# Patient Record
Sex: MALE | Race: BLACK OR AFRICAN AMERICAN | Employment: OTHER | URBAN - METROPOLITAN AREA
[De-identification: names, ages, dates, MRNs, and addresses within clinical notes are randomized per-mention and may not be internally consistent; named-entity substitution may affect disease eponyms.]

---

## 2019-01-01 ENCOUNTER — APPOINTMENT (OUTPATIENT)
Dept: CT IMAGING | Age: 69
DRG: 291 | End: 2019-01-01
Attending: STUDENT IN AN ORGANIZED HEALTH CARE EDUCATION/TRAINING PROGRAM
Payer: MEDICARE

## 2019-01-01 ENCOUNTER — APPOINTMENT (OUTPATIENT)
Dept: MRI IMAGING | Age: 69
DRG: 291 | End: 2019-01-01
Attending: PSYCHIATRY & NEUROLOGY
Payer: MEDICARE

## 2019-01-01 ENCOUNTER — HOSPITAL ENCOUNTER (INPATIENT)
Age: 69
LOS: 35 days | Discharge: HOSPICE | DRG: 291 | End: 2019-11-05
Attending: EMERGENCY MEDICINE | Admitting: STUDENT IN AN ORGANIZED HEALTH CARE EDUCATION/TRAINING PROGRAM
Payer: MEDICARE

## 2019-01-01 ENCOUNTER — APPOINTMENT (OUTPATIENT)
Dept: GENERAL RADIOLOGY | Age: 69
DRG: 291 | End: 2019-01-01
Attending: EMERGENCY MEDICINE
Payer: MEDICARE

## 2019-01-01 ENCOUNTER — HOSPICE ADMISSION (OUTPATIENT)
Dept: HOSPICE | Facility: HOSPICE | Age: 69
End: 2019-01-01
Payer: MEDICARE

## 2019-01-01 ENCOUNTER — APPOINTMENT (OUTPATIENT)
Dept: CT IMAGING | Age: 69
DRG: 291 | End: 2019-01-01
Attending: EMERGENCY MEDICINE
Payer: MEDICARE

## 2019-01-01 ENCOUNTER — HOSPITAL ENCOUNTER (INPATIENT)
Age: 69
LOS: 1 days | End: 2019-11-06
Attending: INTERNAL MEDICINE | Admitting: INTERNAL MEDICINE

## 2019-01-01 ENCOUNTER — APPOINTMENT (OUTPATIENT)
Dept: NON INVASIVE DIAGNOSTICS | Age: 69
DRG: 291 | End: 2019-01-01
Attending: STUDENT IN AN ORGANIZED HEALTH CARE EDUCATION/TRAINING PROGRAM
Payer: MEDICARE

## 2019-01-01 VITALS
OXYGEN SATURATION: 40 % | HEART RATE: 31 BPM | SYSTOLIC BLOOD PRESSURE: 120 MMHG | DIASTOLIC BLOOD PRESSURE: 80 MMHG | TEMPERATURE: 97.4 F | RESPIRATION RATE: 23 BRPM | BODY MASS INDEX: 26.82 KG/M2 | HEIGHT: 65 IN | WEIGHT: 161 LBS

## 2019-01-01 VITALS
TEMPERATURE: 98 F | WEIGHT: 161.38 LBS | RESPIRATION RATE: 26 BRPM | HEART RATE: 111 BPM | DIASTOLIC BLOOD PRESSURE: 101 MMHG | HEIGHT: 65 IN | BODY MASS INDEX: 26.89 KG/M2 | SYSTOLIC BLOOD PRESSURE: 134 MMHG | OXYGEN SATURATION: 98 %

## 2019-01-01 DIAGNOSIS — Z71.89 GOALS OF CARE, COUNSELING/DISCUSSION: ICD-10-CM

## 2019-01-01 DIAGNOSIS — G62.9 NEUROPATHY: ICD-10-CM

## 2019-01-01 DIAGNOSIS — I50.9 CONGESTIVE HEART FAILURE, UNSPECIFIED HF CHRONICITY, UNSPECIFIED HEART FAILURE TYPE (HCC): Primary | ICD-10-CM

## 2019-01-01 DIAGNOSIS — R41.82 ALTERED MENTAL STATUS, UNSPECIFIED ALTERED MENTAL STATUS TYPE: ICD-10-CM

## 2019-01-01 DIAGNOSIS — R60.9 EDEMA, UNSPECIFIED TYPE: ICD-10-CM

## 2019-01-01 DIAGNOSIS — H21.02 HYPHEMA OF LEFT EYE: ICD-10-CM

## 2019-01-01 DIAGNOSIS — I63.9 CEREBROVASCULAR ACCIDENT (CVA), UNSPECIFIED MECHANISM (HCC): ICD-10-CM

## 2019-01-01 DIAGNOSIS — R25.1 TREMOR: ICD-10-CM

## 2019-01-01 DIAGNOSIS — Z66 DO NOT RESUSCITATE: ICD-10-CM

## 2019-01-01 DIAGNOSIS — R63.4 WEIGHT LOSS: ICD-10-CM

## 2019-01-01 DIAGNOSIS — F03.90 DEMENTIA WITHOUT BEHAVIORAL DISTURBANCE, UNSPECIFIED DEMENTIA TYPE: ICD-10-CM

## 2019-01-01 DIAGNOSIS — J90 PLEURAL EFFUSION: ICD-10-CM

## 2019-01-01 LAB
ACE SERPL-CCNC: 22 U/L (ref 14–82)
ALBUMIN SERPL-MCNC: 3.1 G/DL (ref 3.5–5)
ALBUMIN/GLOB SERPL: 0.8 {RATIO} (ref 1.1–2.2)
ALDOLASE SERPL-CCNC: 5.4 U/L (ref 3.3–10.3)
ALP SERPL-CCNC: 97 U/L (ref 45–117)
ALT SERPL-CCNC: 96 U/L (ref 12–78)
AMMONIA PLAS-SCNC: <10 UMOL/L
AMPHET UR QL SCN: NEGATIVE
ANION GAP SERPL CALC-SCNC: 10 MMOL/L (ref 5–15)
ANION GAP SERPL CALC-SCNC: 11 MMOL/L (ref 5–15)
ANION GAP SERPL CALC-SCNC: 13 MMOL/L (ref 5–15)
ANION GAP SERPL CALC-SCNC: 9 MMOL/L (ref 5–15)
ANION GAP SERPL CALC-SCNC: 9 MMOL/L (ref 5–15)
APPEARANCE UR: CLEAR
APTT PPP: 28.9 SEC (ref 22.1–32)
AST SERPL-CCNC: 56 U/L (ref 15–37)
ATRIAL RATE: 92 BPM
BACTERIA SPEC CULT: ABNORMAL
BACTERIA SPEC CULT: ABNORMAL
BACTERIA URNS QL MICRO: NEGATIVE /HPF
BARBITURATES UR QL SCN: NEGATIVE
BASOPHILS # BLD: 0 K/UL (ref 0–0.1)
BASOPHILS NFR BLD: 0 % (ref 0–1)
BENZODIAZ UR QL: NEGATIVE
BILIRUB SERPL-MCNC: 1.1 MG/DL (ref 0.2–1)
BILIRUB UR QL: NEGATIVE
BNP SERPL-MCNC: ABNORMAL PG/ML (ref 0–125)
BUN SERPL-MCNC: 13 MG/DL (ref 6–20)
BUN SERPL-MCNC: 14 MG/DL (ref 6–20)
BUN SERPL-MCNC: 17 MG/DL (ref 6–20)
BUN SERPL-MCNC: 19 MG/DL (ref 6–20)
BUN SERPL-MCNC: 24 MG/DL (ref 6–20)
BUN SERPL-MCNC: 31 MG/DL (ref 6–20)
BUN/CREAT SERPL: 14 (ref 12–20)
BUN/CREAT SERPL: 15 (ref 12–20)
BUN/CREAT SERPL: 22 (ref 12–20)
BUN/CREAT SERPL: 23 (ref 12–20)
BUN/CREAT SERPL: 23 (ref 12–20)
BUN/CREAT SERPL: 25 (ref 12–20)
CALCIUM SERPL-MCNC: 8.2 MG/DL (ref 8.5–10.1)
CALCIUM SERPL-MCNC: 8.3 MG/DL (ref 8.5–10.1)
CALCIUM SERPL-MCNC: 8.5 MG/DL (ref 8.5–10.1)
CALCIUM SERPL-MCNC: 8.6 MG/DL (ref 8.5–10.1)
CALCIUM SERPL-MCNC: 8.9 MG/DL (ref 8.5–10.1)
CALCIUM SERPL-MCNC: 9.6 MG/DL (ref 8.5–10.1)
CALCULATED P AXIS, ECG09: 50 DEGREES
CALCULATED R AXIS, ECG10: 108 DEGREES
CALCULATED T AXIS, ECG11: -28 DEGREES
CANNABINOIDS UR QL SCN: NEGATIVE
CHLORIDE SERPL-SCNC: 102 MMOL/L (ref 97–108)
CHLORIDE SERPL-SCNC: 102 MMOL/L (ref 97–108)
CHLORIDE SERPL-SCNC: 103 MMOL/L (ref 97–108)
CHLORIDE SERPL-SCNC: 106 MMOL/L (ref 97–108)
CHLORIDE SERPL-SCNC: 107 MMOL/L (ref 97–108)
CHOLEST SERPL-MCNC: 131 MG/DL
CO2 SERPL-SCNC: 21 MMOL/L (ref 21–32)
CO2 SERPL-SCNC: 21 MMOL/L (ref 21–32)
CO2 SERPL-SCNC: 22 MMOL/L (ref 21–32)
CO2 SERPL-SCNC: 23 MMOL/L (ref 21–32)
CO2 SERPL-SCNC: 24 MMOL/L (ref 21–32)
COCAINE UR QL SCN: NEGATIVE
COLOR UR: ABNORMAL
CREAT SERPL-MCNC: 0.79 MG/DL (ref 0.7–1.3)
CREAT SERPL-MCNC: 0.83 MG/DL (ref 0.7–1.3)
CREAT SERPL-MCNC: 0.91 MG/DL (ref 0.7–1.3)
CREAT SERPL-MCNC: 0.95 MG/DL (ref 0.7–1.3)
CREAT SERPL-MCNC: 1.06 MG/DL (ref 0.7–1.3)
CREAT SERPL-MCNC: 1.24 MG/DL (ref 0.7–1.3)
DIAGNOSIS, 93000: NORMAL
DIFFERENTIAL METHOD BLD: ABNORMAL
DRUG SCRN COMMENT,DRGCM: NORMAL
ECHO AO ROOT DIAM: 2.33 CM
ECHO AV AREA PLAN: 2.3 CM2
ECHO EST RA PRESSURE: 5 MMHG
ECHO LA AREA 4C: 19.9 CM2
ECHO LA MAJOR AXIS: 4.47 CM
ECHO LA TO AORTIC ROOT RATIO: 1.91
ECHO LA VOL 4C: 56.72 ML (ref 18–58)
ECHO LA VOLUME INDEX A4C: 33.7 ML/M2 (ref 16–28)
ECHO LV EDV A4C: 142.1 ML
ECHO LV EDV INDEX A4C: 84.4 ML/M2
ECHO LV EJECTION FRACTION A4C: 4 %
ECHO LV ESV A4C: 136.3 ML
ECHO LV ESV INDEX A4C: 81 ML/M2
ECHO LV INTERNAL DIMENSION DIASTOLIC: 4.9 CM (ref 4.2–5.9)
ECHO LV INTERNAL DIMENSION SYSTOLIC: 4.67 CM
ECHO LV IVSD: 1.28 CM (ref 0.6–1)
ECHO LV MASS 2D: 271.2 G (ref 88–224)
ECHO LV MASS INDEX 2D: 135.8 G/M2 (ref 49–115)
ECHO LV POSTERIOR WALL DIASTOLIC: 1.13 CM (ref 0.6–1)
ECHO LVOT DIAM: 1.8 CM
ECHO LVOT PEAK GRADIENT: 0.5 MMHG
ECHO LVOT PEAK VELOCITY: 36.48 CM/S
ECHO MV A VELOCITY: 21.4 CM/S
ECHO MV AREA PISA: 0.1 CM2
ECHO MV AREA PLAN: 3.9 CM2
ECHO MV E DECELERATION TIME (DT): 40.6 MS
ECHO MV E VELOCITY: 43.4 CM/S
ECHO MV E/A RATIO: 2
ECHO MV EROA PISA: 0.1 CM2
ECHO MV MAX VELOCITY: 470.18 CM/S
ECHO MV MEAN GRADIENT: 57.6 MMHG
ECHO MV PEAK GRADIENT: 88.4 MMHG
ECHO MV REGURGITANT PEAK GRADIENT: 81 MMHG
ECHO MV REGURGITANT PEAK VELOCITY: 449.86 CM/S
ECHO MV REGURGITANT RADIUS PISA: 0.4 CM
ECHO MV VTI: 136.58 CM
ECHO PULMONARY ARTERY SYSTOLIC PRESSURE (PASP): 42.7 MMHG
ECHO PV MAX VELOCITY: 35.41 CM/S
ECHO PV PEAK GRADIENT: 0.5 MMHG
ECHO RA AREA 4C: 23.9 CM2
ECHO RIGHT VENTRICULAR SYSTOLIC PRESSURE (RVSP): 42.7 MMHG
ECHO TV REGURGITANT MAX VELOCITY: 306.99 CM/S
ECHO TV REGURGITANT PEAK GRADIENT: 37.7 MMHG
EOSINOPHIL # BLD: 0.1 K/UL (ref 0–0.4)
EOSINOPHIL NFR BLD: 1 % (ref 0–7)
EPITH CASTS URNS QL MICRO: ABNORMAL /LPF
ERYTHROCYTE [DISTWIDTH] IN BLOOD BY AUTOMATED COUNT: 25.2 % (ref 11.5–14.5)
ERYTHROCYTE [DISTWIDTH] IN BLOOD BY AUTOMATED COUNT: 26.4 % (ref 11.5–14.5)
ERYTHROCYTE [DISTWIDTH] IN BLOOD BY AUTOMATED COUNT: 27.3 % (ref 11.5–14.5)
ERYTHROCYTE [SEDIMENTATION RATE] IN BLOOD: 3 MM/HR (ref 0–20)
ETHANOL SERPL-MCNC: <10 MG/DL
FOLATE SERPL-MCNC: 13.9 NG/ML (ref 5–21)
GLOBULIN SER CALC-MCNC: 3.7 G/DL (ref 2–4)
GLUCOSE SERPL-MCNC: 103 MG/DL (ref 65–100)
GLUCOSE SERPL-MCNC: 112 MG/DL (ref 65–100)
GLUCOSE SERPL-MCNC: 120 MG/DL (ref 65–100)
GLUCOSE SERPL-MCNC: 121 MG/DL (ref 65–100)
GLUCOSE SERPL-MCNC: 129 MG/DL (ref 65–100)
GLUCOSE SERPL-MCNC: 80 MG/DL (ref 65–100)
GLUCOSE UR STRIP.AUTO-MCNC: NEGATIVE MG/DL
GRAM STN SPEC: ABNORMAL
GRAM STN SPEC: ABNORMAL
HCT VFR BLD AUTO: 34.3 % (ref 36.6–50.3)
HCT VFR BLD AUTO: 36.8 % (ref 36.6–50.3)
HCT VFR BLD AUTO: 39 % (ref 36.6–50.3)
HDLC SERPL-MCNC: 39 MG/DL
HDLC SERPL: 3.4 {RATIO} (ref 0–5)
HGB BLD-MCNC: 10.5 G/DL (ref 12.1–17)
HGB BLD-MCNC: 11.1 G/DL (ref 12.1–17)
HGB BLD-MCNC: 11.7 G/DL (ref 12.1–17)
HGB UR QL STRIP: NEGATIVE
HYALINE CASTS URNS QL MICRO: ABNORMAL /LPF (ref 0–5)
IMM GRANULOCYTES # BLD AUTO: 0 K/UL (ref 0–0.04)
IMM GRANULOCYTES NFR BLD AUTO: 0 % (ref 0–0.5)
INR PPP: 1.4 (ref 0.9–1.1)
KETONES UR QL STRIP.AUTO: ABNORMAL MG/DL
LACTATE SERPL-SCNC: 1.9 MMOL/L (ref 0.4–2)
LACTATE SERPL-SCNC: 2.4 MMOL/L (ref 0.4–2)
LACTATE SERPL-SCNC: 2.6 MMOL/L (ref 0.4–2)
LACTATE SERPL-SCNC: 3 MMOL/L (ref 0.4–2)
LDLC SERPL CALC-MCNC: 76.8 MG/DL (ref 0–100)
LEUKOCYTE ESTERASE UR QL STRIP.AUTO: NEGATIVE
LIPID PROFILE,FLP: NORMAL
LYMPHOCYTES # BLD: 1.3 K/UL (ref 0.8–3.5)
LYMPHOCYTES NFR BLD: 26 % (ref 12–49)
MAGNESIUM SERPL-MCNC: 2 MG/DL (ref 1.6–2.4)
MAGNESIUM SERPL-MCNC: 2.1 MG/DL (ref 1.6–2.4)
MAGNESIUM SERPL-MCNC: 2.2 MG/DL (ref 1.6–2.4)
MAGNESIUM SERPL-MCNC: 2.2 MG/DL (ref 1.6–2.4)
MCH RBC QN AUTO: 20.1 PG (ref 26–34)
MCH RBC QN AUTO: 20.3 PG (ref 26–34)
MCH RBC QN AUTO: 20.3 PG (ref 26–34)
MCHC RBC AUTO-ENTMCNC: 30 G/DL (ref 30–36.5)
MCHC RBC AUTO-ENTMCNC: 30.2 G/DL (ref 30–36.5)
MCHC RBC AUTO-ENTMCNC: 30.6 G/DL (ref 30–36.5)
MCV RBC AUTO: 66.2 FL (ref 80–99)
MCV RBC AUTO: 67 FL (ref 80–99)
MCV RBC AUTO: 67.4 FL (ref 80–99)
METHADONE UR QL: NEGATIVE
MONOCYTES # BLD: 0.4 K/UL (ref 0–1)
MONOCYTES NFR BLD: 7 % (ref 5–13)
MUCOUS THREADS URNS QL MICRO: ABNORMAL /LPF
NEUTS SEG # BLD: 3.3 K/UL (ref 1.8–8)
NEUTS SEG NFR BLD: 66 % (ref 32–75)
NITRITE UR QL STRIP.AUTO: NEGATIVE
NRBC # BLD: 0 K/UL (ref 0–0.01)
NRBC # BLD: 0 K/UL (ref 0–0.01)
NRBC # BLD: 0.03 K/UL (ref 0–0.01)
NRBC BLD-RTO: 0 PER 100 WBC
NRBC BLD-RTO: 0 PER 100 WBC
NRBC BLD-RTO: 0.6 PER 100 WBC
OPIATES UR QL: NEGATIVE
P-R INTERVAL, ECG05: 126 MS
PCP UR QL: NEGATIVE
PERIPHERAL SMEAR,PSM: NORMAL
PH UR STRIP: 5 [PH] (ref 5–8)
PHOSPHATE SERPL-MCNC: 2.9 MG/DL (ref 2.6–4.7)
PLATELET # BLD AUTO: 236 K/UL (ref 150–400)
PLATELET # BLD AUTO: 256 K/UL (ref 150–400)
PLATELET # BLD AUTO: 269 K/UL (ref 150–400)
PMV BLD AUTO: 9.8 FL (ref 8.9–12.9)
POTASSIUM SERPL-SCNC: 3.4 MMOL/L (ref 3.5–5.1)
POTASSIUM SERPL-SCNC: 3.4 MMOL/L (ref 3.5–5.1)
POTASSIUM SERPL-SCNC: 3.6 MMOL/L (ref 3.5–5.1)
POTASSIUM SERPL-SCNC: 4.4 MMOL/L (ref 3.5–5.1)
POTASSIUM SERPL-SCNC: 4.8 MMOL/L (ref 3.5–5.1)
PROCALCITONIN SERPL-MCNC: 0.1 NG/ML
PROT SERPL-MCNC: 6.8 G/DL (ref 6.4–8.2)
PROT UR STRIP-MCNC: ABNORMAL MG/DL
PROTHROMBIN TIME: 14.3 SEC (ref 9–11.1)
Q-T INTERVAL, ECG07: 366 MS
QRS DURATION, ECG06: 98 MS
QTC CALCULATION (BEZET), ECG08: 452 MS
RBC # BLD AUTO: 5.18 M/UL (ref 4.1–5.7)
RBC # BLD AUTO: 5.46 M/UL (ref 4.1–5.7)
RBC # BLD AUTO: 5.82 M/UL (ref 4.1–5.7)
RBC #/AREA URNS HPF: ABNORMAL /HPF (ref 0–5)
RBC MORPH BLD: ABNORMAL
RETICS # AUTO: 0.11 M/UL (ref 0.03–0.1)
RETICS/RBC NFR AUTO: 1.8 % (ref 0.7–2.1)
RPR SER QL: NONREACTIVE
SERVICE CMNT-IMP: ABNORMAL
SODIUM SERPL-SCNC: 134 MMOL/L (ref 136–145)
SODIUM SERPL-SCNC: 137 MMOL/L (ref 136–145)
SODIUM SERPL-SCNC: 139 MMOL/L (ref 136–145)
SODIUM SERPL-SCNC: 140 MMOL/L (ref 136–145)
SODIUM SERPL-SCNC: 141 MMOL/L (ref 136–145)
SP GR UR REFRACTOMETRY: 1.02 (ref 1–1.03)
T4 FREE SERPL-MCNC: 1 NG/DL (ref 0.8–1.5)
THERAPEUTIC RANGE,PTTT: NORMAL SECS (ref 58–77)
TRIGL SERPL-MCNC: 76 MG/DL (ref ?–150)
TROPONIN I SERPL-MCNC: 0.07 NG/ML
TROPONIN I SERPL-MCNC: 0.07 NG/ML
TROPONIN I SERPL-MCNC: 0.08 NG/ML
TSH SERPL DL<=0.05 MIU/L-ACNC: 6.77 UIU/ML (ref 0.36–3.74)
UROBILINOGEN UR QL STRIP.AUTO: 0.2 EU/DL (ref 0.2–1)
VENTRICULAR RATE, ECG03: 92 BPM
VIT B12 SERPL-MCNC: >2000 PG/ML (ref 193–986)
VIT B12 SERPL-MCNC: >2000 PG/ML (ref 193–986)
VIT B6 SERPL-MCNC: 2.8 UG/L (ref 5.3–46.7)
VLDLC SERPL CALC-MCNC: 15.2 MG/DL
WBC # BLD AUTO: 4.2 K/UL (ref 4.1–11.1)
WBC # BLD AUTO: 4.3 K/UL (ref 4.1–11.1)
WBC # BLD AUTO: 5.1 K/UL (ref 4.1–11.1)
WBC URNS QL MICRO: ABNORMAL /HPF (ref 0–4)

## 2019-01-01 PROCEDURE — 36415 COLL VENOUS BLD VENIPUNCTURE: CPT

## 2019-01-01 PROCEDURE — 74011250637 HC RX REV CODE- 250/637: Performed by: INTERNAL MEDICINE

## 2019-01-01 PROCEDURE — 77010033678 HC OXYGEN DAILY

## 2019-01-01 PROCEDURE — 65270000032 HC RM SEMIPRIVATE

## 2019-01-01 PROCEDURE — 74011250636 HC RX REV CODE- 250/636: Performed by: STUDENT IN AN ORGANIZED HEALTH CARE EDUCATION/TRAINING PROGRAM

## 2019-01-01 PROCEDURE — 65270000029 HC RM PRIVATE

## 2019-01-01 PROCEDURE — 85027 COMPLETE CBC AUTOMATED: CPT

## 2019-01-01 PROCEDURE — 74011250636 HC RX REV CODE- 250/636: Performed by: EMERGENCY MEDICINE

## 2019-01-01 PROCEDURE — 95816 EEG AWAKE AND DROWSY: CPT | Performed by: PSYCHIATRY & NEUROLOGY

## 2019-01-01 PROCEDURE — 99218 HC RM OBSERVATION: CPT

## 2019-01-01 PROCEDURE — 77030011256 HC DRSG MEPILEX <16IN NO BORD MOLN -A

## 2019-01-01 PROCEDURE — 99284 EMERGENCY DEPT VISIT MOD MDM: CPT

## 2019-01-01 PROCEDURE — 71045 X-RAY EXAM CHEST 1 VIEW: CPT

## 2019-01-01 PROCEDURE — 84145 PROCALCITONIN (PCT): CPT

## 2019-01-01 PROCEDURE — 83880 ASSAY OF NATRIURETIC PEPTIDE: CPT

## 2019-01-01 PROCEDURE — 74011250637 HC RX REV CODE- 250/637: Performed by: STUDENT IN AN ORGANIZED HEALTH CARE EDUCATION/TRAINING PROGRAM

## 2019-01-01 PROCEDURE — 74011250636 HC RX REV CODE- 250/636: Performed by: PHYSICAL MEDICINE & REHABILITATION

## 2019-01-01 PROCEDURE — 74011250636 HC RX REV CODE- 250/636: Performed by: INTERNAL MEDICINE

## 2019-01-01 PROCEDURE — 97530 THERAPEUTIC ACTIVITIES: CPT | Performed by: PHYSICAL THERAPIST

## 2019-01-01 PROCEDURE — 96376 TX/PRO/DX INJ SAME DRUG ADON: CPT

## 2019-01-01 PROCEDURE — 82607 VITAMIN B-12: CPT

## 2019-01-01 PROCEDURE — 94760 N-INVAS EAR/PLS OXIMETRY 1: CPT

## 2019-01-01 PROCEDURE — 83735 ASSAY OF MAGNESIUM: CPT

## 2019-01-01 PROCEDURE — 80051 ELECTROLYTE PANEL: CPT

## 2019-01-01 PROCEDURE — 80307 DRUG TEST PRSMV CHEM ANLYZR: CPT

## 2019-01-01 PROCEDURE — 85730 THROMBOPLASTIN TIME PARTIAL: CPT

## 2019-01-01 PROCEDURE — 97535 SELF CARE MNGMENT TRAINING: CPT

## 2019-01-01 PROCEDURE — 97116 GAIT TRAINING THERAPY: CPT | Performed by: PHYSICAL THERAPIST

## 2019-01-01 PROCEDURE — 77030037877 HC DRSG MEPILEX >48IN BORD MOLN -A

## 2019-01-01 PROCEDURE — 97116 GAIT TRAINING THERAPY: CPT

## 2019-01-01 PROCEDURE — 74011636320 HC RX REV CODE- 636/320: Performed by: EMERGENCY MEDICINE

## 2019-01-01 PROCEDURE — 84439 ASSAY OF FREE THYROXINE: CPT

## 2019-01-01 PROCEDURE — 84207 ASSAY OF VITAMIN B-6: CPT

## 2019-01-01 PROCEDURE — 86592 SYPHILIS TEST NON-TREP QUAL: CPT

## 2019-01-01 PROCEDURE — 3336500001 HSPC ELECTION

## 2019-01-01 PROCEDURE — 97165 OT EVAL LOW COMPLEX 30 MIN: CPT

## 2019-01-01 PROCEDURE — 85652 RBC SED RATE AUTOMATED: CPT

## 2019-01-01 PROCEDURE — 65220000003 HC RM SEMIPRIVATE PSYCH

## 2019-01-01 PROCEDURE — 87186 SC STD MICRODIL/AGAR DIL: CPT

## 2019-01-01 PROCEDURE — 74177 CT ABD & PELVIS W/CONTRAST: CPT

## 2019-01-01 PROCEDURE — 70450 CT HEAD/BRAIN W/O DYE: CPT

## 2019-01-01 PROCEDURE — G0299 HHS/HOSPICE OF RN EA 15 MIN: HCPCS

## 2019-01-01 PROCEDURE — 80048 BASIC METABOLIC PNL TOTAL CA: CPT

## 2019-01-01 PROCEDURE — 96374 THER/PROPH/DIAG INJ IV PUSH: CPT

## 2019-01-01 PROCEDURE — 80061 LIPID PANEL: CPT

## 2019-01-01 PROCEDURE — 97161 PT EVAL LOW COMPLEX 20 MIN: CPT

## 2019-01-01 PROCEDURE — 87077 CULTURE AEROBIC IDENTIFY: CPT

## 2019-01-01 PROCEDURE — 96372 THER/PROPH/DIAG INJ SC/IM: CPT

## 2019-01-01 PROCEDURE — 0656 HSPC GENERAL INPATIENT

## 2019-01-01 PROCEDURE — 82140 ASSAY OF AMMONIA: CPT

## 2019-01-01 PROCEDURE — 82085 ASSAY OF ALDOLASE: CPT

## 2019-01-01 PROCEDURE — 74011250637 HC RX REV CODE- 250/637: Performed by: HOSPITALIST

## 2019-01-01 PROCEDURE — 84484 ASSAY OF TROPONIN QUANT: CPT

## 2019-01-01 PROCEDURE — 82746 ASSAY OF FOLIC ACID SERUM: CPT

## 2019-01-01 PROCEDURE — 74011250636 HC RX REV CODE- 250/636: Performed by: FAMILY MEDICINE

## 2019-01-01 PROCEDURE — 71275 CT ANGIOGRAPHY CHEST: CPT

## 2019-01-01 PROCEDURE — 85025 COMPLETE CBC W/AUTO DIFF WBC: CPT

## 2019-01-01 PROCEDURE — 81001 URINALYSIS AUTO W/SCOPE: CPT

## 2019-01-01 PROCEDURE — 87205 SMEAR GRAM STAIN: CPT

## 2019-01-01 PROCEDURE — 83605 ASSAY OF LACTIC ACID: CPT

## 2019-01-01 PROCEDURE — 80053 COMPREHEN METABOLIC PANEL: CPT

## 2019-01-01 PROCEDURE — 93306 TTE W/DOPPLER COMPLETE: CPT

## 2019-01-01 PROCEDURE — 84443 ASSAY THYROID STIM HORMONE: CPT

## 2019-01-01 PROCEDURE — 84100 ASSAY OF PHOSPHORUS: CPT

## 2019-01-01 PROCEDURE — 74011636320 HC RX REV CODE- 636/320: Performed by: STUDENT IN AN ORGANIZED HEALTH CARE EDUCATION/TRAINING PROGRAM

## 2019-01-01 PROCEDURE — 74011000250 HC RX REV CODE- 250: Performed by: PHYSICAL MEDICINE & REHABILITATION

## 2019-01-01 PROCEDURE — 70551 MRI BRAIN STEM W/O DYE: CPT

## 2019-01-01 PROCEDURE — 97530 THERAPEUTIC ACTIVITIES: CPT

## 2019-01-01 PROCEDURE — 93005 ELECTROCARDIOGRAM TRACING: CPT

## 2019-01-01 PROCEDURE — 74011250637 HC RX REV CODE- 250/637: Performed by: PHYSICAL MEDICINE & REHABILITATION

## 2019-01-01 PROCEDURE — 85610 PROTHROMBIN TIME: CPT

## 2019-01-01 PROCEDURE — 82164 ANGIOTENSIN I ENZYME TEST: CPT

## 2019-01-01 PROCEDURE — 85045 AUTOMATED RETICULOCYTE COUNT: CPT

## 2019-01-01 RX ORDER — ONDANSETRON 4 MG/1
4 TABLET, ORALLY DISINTEGRATING ORAL
Status: DISCONTINUED | OUTPATIENT
Start: 2019-01-01 | End: 2019-01-01 | Stop reason: HOSPADM

## 2019-01-01 RX ORDER — SODIUM CHLORIDE 0.9 % (FLUSH) 0.9 %
5-10 SYRINGE (ML) INJECTION
Status: COMPLETED | OUTPATIENT
Start: 2019-01-01 | End: 2019-01-01

## 2019-01-01 RX ORDER — HALOPERIDOL 5 MG/ML
2 INJECTION INTRAMUSCULAR
Status: DISCONTINUED | OUTPATIENT
Start: 2019-01-01 | End: 2019-01-01 | Stop reason: HOSPADM

## 2019-01-01 RX ORDER — MORPHINE SULFATE 20 MG/ML
10 SOLUTION ORAL
Status: DISCONTINUED | OUTPATIENT
Start: 2019-01-01 | End: 2019-01-01

## 2019-01-01 RX ORDER — MORPHINE SULFATE 2 MG/ML
3 INJECTION, SOLUTION INTRAMUSCULAR; INTRAVENOUS EVERY 4 HOURS
Status: DISCONTINUED | OUTPATIENT
Start: 2019-01-01 | End: 2019-01-01 | Stop reason: HOSPADM

## 2019-01-01 RX ORDER — SCOLOPAMINE TRANSDERMAL SYSTEM 1 MG/1
1 PATCH, EXTENDED RELEASE TRANSDERMAL
Status: DISCONTINUED | OUTPATIENT
Start: 2019-01-01 | End: 2019-01-01 | Stop reason: HOSPADM

## 2019-01-01 RX ORDER — SPIRONOLACTONE 25 MG/1
25 TABLET ORAL DAILY
Status: DISCONTINUED | OUTPATIENT
Start: 2019-01-01 | End: 2019-01-01

## 2019-01-01 RX ORDER — MORPHINE SULFATE 20 MG/ML
10 SOLUTION ORAL
Status: CANCELLED | OUTPATIENT
Start: 2019-01-01

## 2019-01-01 RX ORDER — FUROSEMIDE 40 MG/1
40 TABLET ORAL
Status: DISCONTINUED | OUTPATIENT
Start: 2019-01-01 | End: 2019-01-01 | Stop reason: HOSPADM

## 2019-01-01 RX ORDER — HALOPERIDOL 1 MG/1
2 TABLET ORAL
Status: DISCONTINUED | OUTPATIENT
Start: 2019-01-01 | End: 2019-01-01

## 2019-01-01 RX ORDER — LEVOTHYROXINE SODIUM 25 UG/1
25 TABLET ORAL
Status: DISCONTINUED | OUTPATIENT
Start: 2019-01-01 | End: 2019-01-01

## 2019-01-01 RX ORDER — CARVEDILOL 3.12 MG/1
3.12 TABLET ORAL 2 TIMES DAILY WITH MEALS
Status: DISCONTINUED | OUTPATIENT
Start: 2019-01-01 | End: 2019-01-01

## 2019-01-01 RX ORDER — MORPHINE SULFATE 2 MG/ML
2 INJECTION, SOLUTION INTRAMUSCULAR; INTRAVENOUS
Status: DISCONTINUED | OUTPATIENT
Start: 2019-01-01 | End: 2019-01-01 | Stop reason: HOSPADM

## 2019-01-01 RX ORDER — POTASSIUM CHLORIDE 750 MG/1
40 TABLET, FILM COATED, EXTENDED RELEASE ORAL
Status: COMPLETED | OUTPATIENT
Start: 2019-01-01 | End: 2019-01-01

## 2019-01-01 RX ORDER — FUROSEMIDE 10 MG/ML
20 INJECTION INTRAMUSCULAR; INTRAVENOUS
Status: DISCONTINUED | OUTPATIENT
Start: 2019-01-01 | End: 2019-01-01

## 2019-01-01 RX ORDER — FACIAL-BODY WIPES
10 EACH TOPICAL DAILY PRN
Status: DISCONTINUED | OUTPATIENT
Start: 2019-01-01 | End: 2019-01-01 | Stop reason: HOSPADM

## 2019-01-01 RX ORDER — CARVEDILOL 3.12 MG/1
3.12 TABLET ORAL 2 TIMES DAILY
Status: DISCONTINUED | OUTPATIENT
Start: 2019-01-01 | End: 2019-01-01

## 2019-01-01 RX ORDER — SODIUM CHLORIDE 0.9 % (FLUSH) 0.9 %
5-40 SYRINGE (ML) INJECTION EVERY 8 HOURS
Status: DISCONTINUED | OUTPATIENT
Start: 2019-01-01 | End: 2019-01-01

## 2019-01-01 RX ORDER — LANOLIN ALCOHOL/MO/W.PET/CERES
100 CREAM (GRAM) TOPICAL DAILY
Status: DISCONTINUED | OUTPATIENT
Start: 2019-01-01 | End: 2019-01-01

## 2019-01-01 RX ORDER — HALOPERIDOL 5 MG/ML
2 INJECTION INTRAMUSCULAR
Status: DISCONTINUED | OUTPATIENT
Start: 2019-01-01 | End: 2019-01-01

## 2019-01-01 RX ORDER — GLYCOPYRROLATE 0.2 MG/ML
0.2 INJECTION INTRAMUSCULAR; INTRAVENOUS
Status: DISCONTINUED | OUTPATIENT
Start: 2019-01-01 | End: 2019-01-01 | Stop reason: HOSPADM

## 2019-01-01 RX ORDER — LORAZEPAM 2 MG/ML
2 CONCENTRATE ORAL
Status: DISCONTINUED | OUTPATIENT
Start: 2019-01-01 | End: 2019-01-01

## 2019-01-01 RX ORDER — LORAZEPAM 2 MG/ML
1 INJECTION INTRAMUSCULAR
Status: DISCONTINUED | OUTPATIENT
Start: 2019-01-01 | End: 2019-01-01 | Stop reason: HOSPADM

## 2019-01-01 RX ORDER — LORAZEPAM 2 MG/ML
1 INJECTION INTRAMUSCULAR
Status: CANCELLED | OUTPATIENT
Start: 2019-01-01

## 2019-01-01 RX ORDER — HEPARIN SODIUM 5000 [USP'U]/ML
5000 INJECTION, SOLUTION INTRAVENOUS; SUBCUTANEOUS EVERY 8 HOURS
Status: DISCONTINUED | OUTPATIENT
Start: 2019-01-01 | End: 2019-01-01

## 2019-01-01 RX ORDER — SODIUM CHLORIDE 0.9 % (FLUSH) 0.9 %
5-40 SYRINGE (ML) INJECTION AS NEEDED
Status: DISCONTINUED | OUTPATIENT
Start: 2019-01-01 | End: 2019-01-01 | Stop reason: HOSPADM

## 2019-01-01 RX ORDER — POTASSIUM CHLORIDE 7.45 MG/ML
10 INJECTION INTRAVENOUS ONCE
Status: COMPLETED | OUTPATIENT
Start: 2019-01-01 | End: 2019-01-01

## 2019-01-01 RX ORDER — MORPHINE SULFATE 20 MG/ML
10 SOLUTION ORAL 4 TIMES DAILY
Status: DISCONTINUED | OUTPATIENT
Start: 2019-01-01 | End: 2019-01-01

## 2019-01-01 RX ORDER — AMLODIPINE BESYLATE 5 MG/1
5 TABLET ORAL DAILY
Status: DISCONTINUED | OUTPATIENT
Start: 2019-01-01 | End: 2019-01-01

## 2019-01-01 RX ORDER — DONEPEZIL HYDROCHLORIDE 5 MG/1
5 TABLET, FILM COATED ORAL
Status: DISCONTINUED | OUTPATIENT
Start: 2019-01-01 | End: 2019-01-01

## 2019-01-01 RX ORDER — MAGNESIUM SULFATE 1 G/100ML
1 INJECTION INTRAVENOUS ONCE
Status: COMPLETED | OUTPATIENT
Start: 2019-01-01 | End: 2019-01-01

## 2019-01-01 RX ORDER — HALOPERIDOL 2 MG/ML
2 SOLUTION ORAL
Status: DISCONTINUED | OUTPATIENT
Start: 2019-01-01 | End: 2019-01-01 | Stop reason: HOSPADM

## 2019-01-01 RX ORDER — ACETAMINOPHEN 325 MG/1
650 TABLET ORAL
Status: DISCONTINUED | OUTPATIENT
Start: 2019-01-01 | End: 2019-01-01

## 2019-01-01 RX ORDER — LOPERAMIDE HYDROCHLORIDE 2 MG/1
2 CAPSULE ORAL
Status: DISCONTINUED | OUTPATIENT
Start: 2019-01-01 | End: 2019-01-01

## 2019-01-01 RX ORDER — FUROSEMIDE 10 MG/ML
20 INJECTION INTRAMUSCULAR; INTRAVENOUS
Status: COMPLETED | OUTPATIENT
Start: 2019-01-01 | End: 2019-01-01

## 2019-01-01 RX ORDER — ACETAMINOPHEN 650 MG/1
650 SUPPOSITORY RECTAL
Status: DISCONTINUED | OUTPATIENT
Start: 2019-01-01 | End: 2019-01-01 | Stop reason: HOSPADM

## 2019-01-01 RX ORDER — MORPHINE SULFATE 2 MG/ML
2 INJECTION, SOLUTION INTRAMUSCULAR; INTRAVENOUS
Status: DISCONTINUED | OUTPATIENT
Start: 2019-01-01 | End: 2019-01-01

## 2019-01-01 RX ORDER — LANOLIN ALCOHOL/MO/W.PET/CERES
1 CREAM (GRAM) TOPICAL 2 TIMES DAILY WITH MEALS
Status: DISCONTINUED | OUTPATIENT
Start: 2019-01-01 | End: 2019-01-01

## 2019-01-01 RX ORDER — LORAZEPAM 2 MG/ML
1 CONCENTRATE ORAL
Status: CANCELLED | OUTPATIENT
Start: 2019-01-01

## 2019-01-01 RX ORDER — FACIAL-BODY WIPES
10 EACH TOPICAL DAILY PRN
Status: CANCELLED | OUTPATIENT
Start: 2019-01-01

## 2019-01-01 RX ORDER — POLYETHYLENE GLYCOL 3350 17 G/17G
17 POWDER, FOR SOLUTION ORAL DAILY
Status: DISCONTINUED | OUTPATIENT
Start: 2019-01-01 | End: 2019-01-01 | Stop reason: HOSPADM

## 2019-01-01 RX ORDER — SODIUM CHLORIDE 9 MG/ML
75 INJECTION, SOLUTION INTRAVENOUS CONTINUOUS
Status: DISCONTINUED | OUTPATIENT
Start: 2019-01-01 | End: 2019-01-01

## 2019-01-01 RX ORDER — GLYCOPYRROLATE 0.2 MG/ML
0.1 INJECTION INTRAMUSCULAR; INTRAVENOUS
Status: DISCONTINUED | OUTPATIENT
Start: 2019-01-01 | End: 2019-01-01 | Stop reason: HOSPADM

## 2019-01-01 RX ORDER — SPIRONOLACTONE 25 MG/1
50 TABLET ORAL DAILY
Status: DISCONTINUED | OUTPATIENT
Start: 2019-01-01 | End: 2019-01-01

## 2019-01-01 RX ORDER — AMOXICILLIN 250 MG
2 CAPSULE ORAL
Status: DISCONTINUED | OUTPATIENT
Start: 2019-01-01 | End: 2019-01-01

## 2019-01-01 RX ORDER — CARVEDILOL 3.12 MG/1
6.25 TABLET ORAL 2 TIMES DAILY
Status: DISCONTINUED | OUTPATIENT
Start: 2019-01-01 | End: 2019-01-01

## 2019-01-01 RX ORDER — MORPHINE SULFATE 2 MG/ML
2 INJECTION, SOLUTION INTRAMUSCULAR; INTRAVENOUS
Status: CANCELLED | OUTPATIENT
Start: 2019-01-01

## 2019-01-01 RX ADMIN — FERROUS SULFATE TAB 325 MG (65 MG ELEMENTAL FE) 325 MG: 325 (65 FE) TAB at 17:56

## 2019-01-01 RX ADMIN — HALOPERIDOL LACTATE 2 MG: 5 INJECTION, SOLUTION INTRAMUSCULAR at 23:13

## 2019-01-01 RX ADMIN — AMLODIPINE BESYLATE 5 MG: 5 TABLET ORAL at 20:06

## 2019-01-01 RX ADMIN — MORPHINE SULFATE 10 MG: 20 SOLUTION ORAL at 12:52

## 2019-01-01 RX ADMIN — HALOPERIDOL 2 MG: 1 TABLET ORAL at 21:27

## 2019-01-01 RX ADMIN — SACUBITRIL AND VALSARTAN 1 TABLET: 24; 26 TABLET, FILM COATED ORAL at 20:54

## 2019-01-01 RX ADMIN — HEPARIN SODIUM 5000 UNITS: 5000 INJECTION, SOLUTION INTRAVENOUS; SUBCUTANEOUS at 14:30

## 2019-01-01 RX ADMIN — MORPHINE SULFATE 2 MG: 2 INJECTION, SOLUTION INTRAMUSCULAR; INTRAVENOUS at 16:17

## 2019-01-01 RX ADMIN — LEVOTHYROXINE SODIUM 25 MCG: 25 TABLET ORAL at 05:18

## 2019-01-01 RX ADMIN — LORAZEPAM 1 MG: 2 INJECTION INTRAMUSCULAR; INTRAVENOUS at 05:18

## 2019-01-01 RX ADMIN — MORPHINE SULFATE 2 MG: 2 INJECTION, SOLUTION INTRAMUSCULAR; INTRAVENOUS at 01:12

## 2019-01-01 RX ADMIN — HALOPERIDOL 2 MG: 2 SOLUTION ORAL at 00:43

## 2019-01-01 RX ADMIN — HEPARIN SODIUM 5000 UNITS: 5000 INJECTION, SOLUTION INTRAVENOUS; SUBCUTANEOUS at 06:14

## 2019-01-01 RX ADMIN — MORPHINE SULFATE 10 MG: 20 SOLUTION ORAL at 13:09

## 2019-01-01 RX ADMIN — DONEPEZIL HYDROCHLORIDE 5 MG: 5 TABLET, FILM COATED ORAL at 21:32

## 2019-01-01 RX ADMIN — HEPARIN SODIUM 5000 UNITS: 5000 INJECTION, SOLUTION INTRAVENOUS; SUBCUTANEOUS at 22:30

## 2019-01-01 RX ADMIN — CARVEDILOL 6.25 MG: 3.12 TABLET, FILM COATED ORAL at 08:28

## 2019-01-01 RX ADMIN — FERROUS SULFATE TAB 325 MG (65 MG ELEMENTAL FE) 325 MG: 325 (65 FE) TAB at 16:06

## 2019-01-01 RX ADMIN — POLYETHYLENE GLYCOL 3350 17 G: 17 POWDER, FOR SOLUTION ORAL at 11:00

## 2019-01-01 RX ADMIN — MORPHINE SULFATE 10 MG: 20 SOLUTION ORAL at 09:22

## 2019-01-01 RX ADMIN — SACUBITRIL AND VALSARTAN 1 TABLET: 24; 26 TABLET, FILM COATED ORAL at 21:09

## 2019-01-01 RX ADMIN — MORPHINE SULFATE 10 MG: 20 SOLUTION ORAL at 02:11

## 2019-01-01 RX ADMIN — Medication 10 ML: at 05:52

## 2019-01-01 RX ADMIN — HEPARIN SODIUM 5000 UNITS: 5000 INJECTION, SOLUTION INTRAVENOUS; SUBCUTANEOUS at 21:40

## 2019-01-01 RX ADMIN — MORPHINE SULFATE 10 MG: 20 SOLUTION ORAL at 09:40

## 2019-01-01 RX ADMIN — MAGNESIUM SULFATE HEPTAHYDRATE 1 G: 1 INJECTION, SOLUTION INTRAVENOUS at 15:14

## 2019-01-01 RX ADMIN — LORAZEPAM 2 MG: 2 SOLUTION, CONCENTRATE ORAL at 01:40

## 2019-01-01 RX ADMIN — HEPARIN SODIUM 5000 UNITS: 5000 INJECTION, SOLUTION INTRAVENOUS; SUBCUTANEOUS at 13:28

## 2019-01-01 RX ADMIN — Medication 1 CAPSULE: at 09:14

## 2019-01-01 RX ADMIN — LEVOTHYROXINE SODIUM 25 MCG: 25 TABLET ORAL at 06:43

## 2019-01-01 RX ADMIN — POLYETHYLENE GLYCOL 3350 17 G: 17 POWDER, FOR SOLUTION ORAL at 09:37

## 2019-01-01 RX ADMIN — HEPARIN SODIUM 5000 UNITS: 5000 INJECTION, SOLUTION INTRAVENOUS; SUBCUTANEOUS at 13:56

## 2019-01-01 RX ADMIN — FERROUS SULFATE TAB 325 MG (65 MG ELEMENTAL FE) 325 MG: 325 (65 FE) TAB at 16:04

## 2019-01-01 RX ADMIN — DIATRIZOATE MEGLUMINE AND DIATRIZOATE SODIUM 30 ML: 600; 100 SOLUTION ORAL; RECTAL at 09:31

## 2019-01-01 RX ADMIN — SPIRONOLACTONE 25 MG: 25 TABLET, FILM COATED ORAL at 16:25

## 2019-01-01 RX ADMIN — HEPARIN SODIUM 5000 UNITS: 5000 INJECTION, SOLUTION INTRAVENOUS; SUBCUTANEOUS at 05:11

## 2019-01-01 RX ADMIN — FERROUS SULFATE TAB 325 MG (65 MG ELEMENTAL FE) 325 MG: 325 (65 FE) TAB at 16:21

## 2019-01-01 RX ADMIN — HALOPERIDOL 2 MG: 1 TABLET ORAL at 14:15

## 2019-01-01 RX ADMIN — FERROUS SULFATE TAB 325 MG (65 MG ELEMENTAL FE) 325 MG: 325 (65 FE) TAB at 13:38

## 2019-01-01 RX ADMIN — SACUBITRIL AND VALSARTAN 1 TABLET: 24; 26 TABLET, FILM COATED ORAL at 22:17

## 2019-01-01 RX ADMIN — POLYETHYLENE GLYCOL 3350 17 G: 17 POWDER, FOR SOLUTION ORAL at 10:04

## 2019-01-01 RX ADMIN — HEPARIN SODIUM 5000 UNITS: 5000 INJECTION, SOLUTION INTRAVENOUS; SUBCUTANEOUS at 06:10

## 2019-01-01 RX ADMIN — FERROUS SULFATE TAB 325 MG (65 MG ELEMENTAL FE) 325 MG: 325 (65 FE) TAB at 12:03

## 2019-01-01 RX ADMIN — HALOPERIDOL LACTATE 2 MG: 5 INJECTION INTRAMUSCULAR at 01:12

## 2019-01-01 RX ADMIN — DONEPEZIL HYDROCHLORIDE 5 MG: 5 TABLET, FILM COATED ORAL at 21:09

## 2019-01-01 RX ADMIN — HEPARIN SODIUM 5000 UNITS: 5000 INJECTION, SOLUTION INTRAVENOUS; SUBCUTANEOUS at 06:43

## 2019-01-01 RX ADMIN — Medication 100 MG: at 09:18

## 2019-01-01 RX ADMIN — HEPARIN SODIUM 5000 UNITS: 5000 INJECTION, SOLUTION INTRAVENOUS; SUBCUTANEOUS at 13:24

## 2019-01-01 RX ADMIN — LOPERAMIDE HYDROCHLORIDE 2 MG: 2 CAPSULE ORAL at 01:09

## 2019-01-01 RX ADMIN — HEPARIN SODIUM 5000 UNITS: 5000 INJECTION, SOLUTION INTRAVENOUS; SUBCUTANEOUS at 06:09

## 2019-01-01 RX ADMIN — Medication 1 CAPSULE: at 08:27

## 2019-01-01 RX ADMIN — LOPERAMIDE HYDROCHLORIDE 2 MG: 2 CAPSULE ORAL at 23:29

## 2019-01-01 RX ADMIN — FERROUS SULFATE TAB 325 MG (65 MG ELEMENTAL FE) 325 MG: 325 (65 FE) TAB at 16:37

## 2019-01-01 RX ADMIN — SPIRONOLACTONE 25 MG: 25 TABLET, FILM COATED ORAL at 08:36

## 2019-01-01 RX ADMIN — SACUBITRIL AND VALSARTAN 1 TABLET: 24; 26 TABLET, FILM COATED ORAL at 08:36

## 2019-01-01 RX ADMIN — Medication 1 CAPSULE: at 08:48

## 2019-01-01 RX ADMIN — IOPAMIDOL 100 ML: 755 INJECTION, SOLUTION INTRAVENOUS at 18:13

## 2019-01-01 RX ADMIN — FUROSEMIDE 20 MG: 10 INJECTION, SOLUTION INTRAMUSCULAR; INTRAVENOUS at 20:06

## 2019-01-01 RX ADMIN — SPIRONOLACTONE 50 MG: 25 TABLET, FILM COATED ORAL at 08:27

## 2019-01-01 RX ADMIN — FERROUS SULFATE TAB 325 MG (65 MG ELEMENTAL FE) 325 MG: 325 (65 FE) TAB at 12:11

## 2019-01-01 RX ADMIN — HEPARIN SODIUM 5000 UNITS: 5000 INJECTION, SOLUTION INTRAVENOUS; SUBCUTANEOUS at 22:24

## 2019-01-01 RX ADMIN — CARVEDILOL 6.25 MG: 3.12 TABLET, FILM COATED ORAL at 08:27

## 2019-01-01 RX ADMIN — MORPHINE SULFATE 10 MG: 20 SOLUTION ORAL at 13:13

## 2019-01-01 RX ADMIN — MORPHINE SULFATE 10 MG: 20 SOLUTION ORAL at 18:42

## 2019-01-01 RX ADMIN — MORPHINE SULFATE 10 MG: 20 SOLUTION ORAL at 09:37

## 2019-01-01 RX ADMIN — SACUBITRIL AND VALSARTAN 1 TABLET: 24; 26 TABLET, FILM COATED ORAL at 08:37

## 2019-01-01 RX ADMIN — MORPHINE SULFATE 10 MG: 20 SOLUTION ORAL at 13:54

## 2019-01-01 RX ADMIN — MORPHINE SULFATE 2 MG: 2 INJECTION, SOLUTION INTRAMUSCULAR; INTRAVENOUS at 23:38

## 2019-01-01 RX ADMIN — MORPHINE SULFATE 2 MG: 2 INJECTION, SOLUTION INTRAMUSCULAR; INTRAVENOUS at 06:52

## 2019-01-01 RX ADMIN — HEPARIN SODIUM 5000 UNITS: 5000 INJECTION, SOLUTION INTRAVENOUS; SUBCUTANEOUS at 22:00

## 2019-01-01 RX ADMIN — HEPARIN SODIUM 5000 UNITS: 5000 INJECTION, SOLUTION INTRAVENOUS; SUBCUTANEOUS at 21:27

## 2019-01-01 RX ADMIN — HEPARIN SODIUM 5000 UNITS: 5000 INJECTION, SOLUTION INTRAVENOUS; SUBCUTANEOUS at 05:49

## 2019-01-01 RX ADMIN — LORAZEPAM 1 MG: 2 INJECTION, SOLUTION INTRAMUSCULAR; INTRAVENOUS at 16:18

## 2019-01-01 RX ADMIN — Medication 100 MG: at 08:27

## 2019-01-01 RX ADMIN — MORPHINE SULFATE 10 MG: 20 SOLUTION ORAL at 17:59

## 2019-01-01 RX ADMIN — Medication 100 MG: at 12:11

## 2019-01-01 RX ADMIN — CARVEDILOL 3.12 MG: 3.12 TABLET, FILM COATED ORAL at 21:32

## 2019-01-01 RX ADMIN — HEPARIN SODIUM 5000 UNITS: 5000 INJECTION, SOLUTION INTRAVENOUS; SUBCUTANEOUS at 16:25

## 2019-01-01 RX ADMIN — MORPHINE SULFATE 3 MG: 2 INJECTION, SOLUTION INTRAMUSCULAR; INTRAVENOUS at 14:33

## 2019-01-01 RX ADMIN — POTASSIUM CHLORIDE 10 MEQ: 7.46 INJECTION, SOLUTION INTRAVENOUS at 13:56

## 2019-01-01 RX ADMIN — HEPARIN SODIUM 5000 UNITS: 5000 INJECTION, SOLUTION INTRAVENOUS; SUBCUTANEOUS at 21:33

## 2019-01-01 RX ADMIN — CARVEDILOL 6.25 MG: 3.12 TABLET, FILM COATED ORAL at 21:27

## 2019-01-01 RX ADMIN — LEVOTHYROXINE SODIUM 25 MCG: 25 TABLET ORAL at 06:14

## 2019-01-01 RX ADMIN — MORPHINE SULFATE 10 MG: 20 SOLUTION ORAL at 00:20

## 2019-01-01 RX ADMIN — HEPARIN SODIUM 5000 UNITS: 5000 INJECTION, SOLUTION INTRAVENOUS; SUBCUTANEOUS at 21:09

## 2019-01-01 RX ADMIN — Medication 10 ML: at 14:23

## 2019-01-01 RX ADMIN — Medication 5 ML: at 22:35

## 2019-01-01 RX ADMIN — Medication 10 ML: at 21:06

## 2019-01-01 RX ADMIN — SACUBITRIL AND VALSARTAN 1 TABLET: 24; 26 TABLET, FILM COATED ORAL at 21:40

## 2019-01-01 RX ADMIN — FERROUS SULFATE TAB 325 MG (65 MG ELEMENTAL FE) 325 MG: 325 (65 FE) TAB at 17:23

## 2019-01-01 RX ADMIN — MORPHINE SULFATE 10 MG: 20 SOLUTION ORAL at 21:17

## 2019-01-01 RX ADMIN — FERROUS SULFATE TAB 325 MG (65 MG ELEMENTAL FE) 325 MG: 325 (65 FE) TAB at 17:01

## 2019-01-01 RX ADMIN — Medication 10 ML: at 12:12

## 2019-01-01 RX ADMIN — DONEPEZIL HYDROCHLORIDE 5 MG: 5 TABLET, FILM COATED ORAL at 21:27

## 2019-01-01 RX ADMIN — Medication 10 ML: at 06:15

## 2019-01-01 RX ADMIN — MORPHINE SULFATE 10 MG: 20 SOLUTION ORAL at 11:21

## 2019-01-01 RX ADMIN — Medication 5 ML: at 21:40

## 2019-01-01 RX ADMIN — FERROUS SULFATE TAB 325 MG (65 MG ELEMENTAL FE) 325 MG: 325 (65 FE) TAB at 10:59

## 2019-01-01 RX ADMIN — CARVEDILOL 3.12 MG: 3.12 TABLET, FILM COATED ORAL at 08:37

## 2019-01-01 RX ADMIN — SACUBITRIL AND VALSARTAN 1 TABLET: 24; 26 TABLET, FILM COATED ORAL at 09:18

## 2019-01-01 RX ADMIN — MORPHINE SULFATE 2 MG: 2 INJECTION, SOLUTION INTRAMUSCULAR; INTRAVENOUS at 00:25

## 2019-01-01 RX ADMIN — CARVEDILOL 3.12 MG: 3.12 TABLET, FILM COATED ORAL at 08:44

## 2019-01-01 RX ADMIN — Medication 10 ML: at 13:27

## 2019-01-01 RX ADMIN — MORPHINE SULFATE 10 MG: 20 SOLUTION ORAL at 14:28

## 2019-01-01 RX ADMIN — Medication 10 ML: at 21:10

## 2019-01-01 RX ADMIN — MORPHINE SULFATE 10 MG: 20 SOLUTION ORAL at 10:04

## 2019-01-01 RX ADMIN — MORPHINE SULFATE 10 MG: 20 SOLUTION ORAL at 16:15

## 2019-01-01 RX ADMIN — LEVOTHYROXINE SODIUM 25 MCG: 25 TABLET ORAL at 06:42

## 2019-01-01 RX ADMIN — DONEPEZIL HYDROCHLORIDE 5 MG: 5 TABLET, FILM COATED ORAL at 21:41

## 2019-01-01 RX ADMIN — Medication 100 MG: at 08:28

## 2019-01-01 RX ADMIN — MORPHINE SULFATE 10 MG: 20 SOLUTION ORAL at 21:58

## 2019-01-01 RX ADMIN — Medication 10 ML: at 13:42

## 2019-01-01 RX ADMIN — SPIRONOLACTONE 50 MG: 25 TABLET, FILM COATED ORAL at 09:18

## 2019-01-01 RX ADMIN — SPIRONOLACTONE 50 MG: 25 TABLET, FILM COATED ORAL at 08:44

## 2019-01-01 RX ADMIN — POTASSIUM CHLORIDE 40 MEQ: 750 TABLET, FILM COATED, EXTENDED RELEASE ORAL at 11:30

## 2019-01-01 RX ADMIN — CARVEDILOL 3.12 MG: 3.12 TABLET, FILM COATED ORAL at 21:40

## 2019-01-01 RX ADMIN — Medication 5 ML: at 05:19

## 2019-01-01 RX ADMIN — CARVEDILOL 3.12 MG: 3.12 TABLET, FILM COATED ORAL at 21:09

## 2019-01-01 RX ADMIN — SACUBITRIL AND VALSARTAN 1 TABLET: 24; 26 TABLET, FILM COATED ORAL at 08:26

## 2019-01-01 RX ADMIN — MORPHINE SULFATE 2 MG: 2 INJECTION, SOLUTION INTRAMUSCULAR; INTRAVENOUS at 02:44

## 2019-01-01 RX ADMIN — CARVEDILOL 6.25 MG: 3.12 TABLET, FILM COATED ORAL at 08:16

## 2019-01-01 RX ADMIN — Medication 1 CAPSULE: at 09:37

## 2019-01-01 RX ADMIN — Medication 1 CAPSULE: at 12:11

## 2019-01-01 RX ADMIN — Medication 10 ML: at 06:05

## 2019-01-01 RX ADMIN — MORPHINE SULFATE 10 MG: 20 SOLUTION ORAL at 05:36

## 2019-01-01 RX ADMIN — CARVEDILOL 3.12 MG: 3.12 TABLET, FILM COATED ORAL at 08:32

## 2019-01-01 RX ADMIN — HEPARIN SODIUM 5000 UNITS: 5000 INJECTION, SOLUTION INTRAVENOUS; SUBCUTANEOUS at 05:18

## 2019-01-01 RX ADMIN — POLYETHYLENE GLYCOL 3350 17 G: 17 POWDER, FOR SOLUTION ORAL at 09:18

## 2019-01-01 RX ADMIN — SPIRONOLACTONE 50 MG: 25 TABLET, FILM COATED ORAL at 08:51

## 2019-01-01 RX ADMIN — LEVOTHYROXINE SODIUM 25 MCG: 25 TABLET ORAL at 06:10

## 2019-01-01 RX ADMIN — CARVEDILOL 3.12 MG: 3.12 TABLET, FILM COATED ORAL at 08:51

## 2019-01-01 RX ADMIN — MORPHINE SULFATE 10 MG: 20 SOLUTION ORAL at 06:42

## 2019-01-01 RX ADMIN — MORPHINE SULFATE 10 MG: 20 SOLUTION ORAL at 21:11

## 2019-01-01 RX ADMIN — MORPHINE SULFATE 2 MG: 2 INJECTION, SOLUTION INTRAMUSCULAR; INTRAVENOUS at 22:05

## 2019-01-01 RX ADMIN — FERROUS SULFATE TAB 325 MG (65 MG ELEMENTAL FE) 325 MG: 325 (65 FE) TAB at 10:22

## 2019-01-01 RX ADMIN — Medication 10 ML: at 06:29

## 2019-01-01 RX ADMIN — CARVEDILOL 6.25 MG: 3.12 TABLET, FILM COATED ORAL at 20:54

## 2019-01-01 RX ADMIN — Medication 10 ML: at 21:33

## 2019-01-01 RX ADMIN — MORPHINE SULFATE 2 MG: 2 INJECTION, SOLUTION INTRAMUSCULAR; INTRAVENOUS at 20:55

## 2019-01-01 RX ADMIN — SACUBITRIL AND VALSARTAN 1 TABLET: 24; 26 TABLET, FILM COATED ORAL at 22:30

## 2019-01-01 RX ADMIN — HEPARIN SODIUM 5000 UNITS: 5000 INJECTION, SOLUTION INTRAVENOUS; SUBCUTANEOUS at 06:29

## 2019-01-01 RX ADMIN — HEPARIN SODIUM 5000 UNITS: 5000 INJECTION, SOLUTION INTRAVENOUS; SUBCUTANEOUS at 06:28

## 2019-01-01 RX ADMIN — HEPARIN SODIUM 5000 UNITS: 5000 INJECTION, SOLUTION INTRAVENOUS; SUBCUTANEOUS at 06:42

## 2019-01-01 RX ADMIN — SACUBITRIL AND VALSARTAN 1 TABLET: 24; 26 TABLET, FILM COATED ORAL at 16:25

## 2019-01-01 RX ADMIN — Medication 1 CAPSULE: at 09:18

## 2019-01-01 RX ADMIN — AMLODIPINE BESYLATE 5 MG: 5 TABLET ORAL at 09:31

## 2019-01-01 RX ADMIN — HEPARIN SODIUM 5000 UNITS: 5000 INJECTION, SOLUTION INTRAVENOUS; SUBCUTANEOUS at 13:26

## 2019-01-01 RX ADMIN — MORPHINE SULFATE 10 MG: 20 SOLUTION ORAL at 16:29

## 2019-01-01 RX ADMIN — MORPHINE SULFATE 10 MG: 20 SOLUTION ORAL at 13:20

## 2019-01-01 RX ADMIN — MORPHINE SULFATE 10 MG: 20 SOLUTION ORAL at 17:57

## 2019-01-01 RX ADMIN — SPIRONOLACTONE 50 MG: 25 TABLET, FILM COATED ORAL at 08:16

## 2019-01-01 RX ADMIN — MORPHINE SULFATE 10 MG: 20 SOLUTION ORAL at 23:38

## 2019-01-01 RX ADMIN — Medication 10 ML: at 06:00

## 2019-01-01 RX ADMIN — FUROSEMIDE 40 MG: 40 TABLET ORAL at 13:42

## 2019-01-01 RX ADMIN — MORPHINE SULFATE 10 MG: 20 SOLUTION ORAL at 21:36

## 2019-01-01 RX ADMIN — IOPAMIDOL 100 ML: 755 INJECTION, SOLUTION INTRAVENOUS at 11:34

## 2019-01-01 RX ADMIN — SACUBITRIL AND VALSARTAN 1 TABLET: 24; 26 TABLET, FILM COATED ORAL at 08:44

## 2019-01-01 RX ADMIN — CARVEDILOL 3.12 MG: 3.12 TABLET, FILM COATED ORAL at 14:54

## 2019-01-01 RX ADMIN — HEPARIN SODIUM 5000 UNITS: 5000 INJECTION, SOLUTION INTRAVENOUS; SUBCUTANEOUS at 13:40

## 2019-01-01 RX ADMIN — FERROUS SULFATE TAB 325 MG (65 MG ELEMENTAL FE) 325 MG: 325 (65 FE) TAB at 11:30

## 2019-01-01 RX ADMIN — MORPHINE SULFATE 10 MG: 20 SOLUTION ORAL at 23:08

## 2019-01-01 RX ADMIN — LOPERAMIDE HYDROCHLORIDE 2 MG: 2 CAPSULE ORAL at 14:57

## 2019-01-01 RX ADMIN — FUROSEMIDE 40 MG: 40 TABLET ORAL at 18:30

## 2019-01-01 RX ADMIN — MORPHINE SULFATE 10 MG: 20 SOLUTION ORAL at 17:18

## 2019-01-01 RX ADMIN — Medication 10 ML: at 16:26

## 2019-01-01 RX ADMIN — MORPHINE SULFATE 10 MG: 20 SOLUTION ORAL at 13:18

## 2019-01-01 RX ADMIN — SACUBITRIL AND VALSARTAN 1 TABLET: 24; 26 TABLET, FILM COATED ORAL at 08:17

## 2019-01-01 RX ADMIN — LORAZEPAM 1 MG: 2 INJECTION INTRAMUSCULAR; INTRAVENOUS at 06:52

## 2019-01-01 RX ADMIN — BISACODYL 10 MG: 10 SUPPOSITORY RECTAL at 07:03

## 2019-01-01 RX ADMIN — MORPHINE SULFATE 10 MG: 20 SOLUTION ORAL at 13:50

## 2019-01-01 RX ADMIN — LORAZEPAM 1 MG: 2 INJECTION INTRAMUSCULAR; INTRAVENOUS at 00:24

## 2019-01-01 RX ADMIN — FERROUS SULFATE TAB 325 MG (65 MG ELEMENTAL FE) 325 MG: 325 (65 FE) TAB at 10:53

## 2019-01-01 RX ADMIN — MORPHINE SULFATE 2 MG: 2 INJECTION, SOLUTION INTRAMUSCULAR; INTRAVENOUS at 05:39

## 2019-01-01 RX ADMIN — Medication 10 ML: at 14:06

## 2019-01-01 RX ADMIN — LORAZEPAM 1 MG: 2 INJECTION INTRAMUSCULAR; INTRAVENOUS at 05:38

## 2019-01-01 RX ADMIN — LOPERAMIDE HYDROCHLORIDE 2 MG: 2 CAPSULE ORAL at 12:03

## 2019-01-01 RX ADMIN — LEVOTHYROXINE SODIUM 25 MCG: 25 TABLET ORAL at 06:09

## 2019-01-01 RX ADMIN — CARVEDILOL 6.25 MG: 3.12 TABLET, FILM COATED ORAL at 22:17

## 2019-01-01 RX ADMIN — FUROSEMIDE 40 MG: 40 TABLET ORAL at 12:46

## 2019-01-01 RX ADMIN — SPIRONOLACTONE 50 MG: 25 TABLET, FILM COATED ORAL at 08:37

## 2019-01-01 RX ADMIN — SACUBITRIL AND VALSARTAN 1 TABLET: 24; 26 TABLET, FILM COATED ORAL at 21:27

## 2019-01-01 RX ADMIN — FUROSEMIDE 40 MG: 40 TABLET ORAL at 17:18

## 2019-01-01 RX ADMIN — Medication 1 CAPSULE: at 08:28

## 2019-01-01 RX ADMIN — SACUBITRIL AND VALSARTAN 1 TABLET: 24; 26 TABLET, FILM COATED ORAL at 08:28

## 2019-01-01 RX ADMIN — Medication 10 ML: at 21:27

## 2019-01-01 RX ADMIN — MORPHINE SULFATE 10 MG: 20 SOLUTION ORAL at 09:01

## 2019-01-01 RX ADMIN — Medication 10 ML: at 06:11

## 2019-01-01 RX ADMIN — CARVEDILOL 6.25 MG: 3.12 TABLET, FILM COATED ORAL at 09:18

## 2019-01-01 RX ADMIN — SACUBITRIL AND VALSARTAN 1 TABLET: 24; 26 TABLET, FILM COATED ORAL at 08:51

## 2019-01-01 RX ADMIN — HEPARIN SODIUM 5000 UNITS: 5000 INJECTION, SOLUTION INTRAVENOUS; SUBCUTANEOUS at 14:17

## 2019-01-01 RX ADMIN — MORPHINE SULFATE 10 MG: 20 SOLUTION ORAL at 17:05

## 2019-01-01 RX ADMIN — SODIUM CHLORIDE 75 ML/HR: 900 INJECTION, SOLUTION INTRAVENOUS at 12:09

## 2019-01-01 RX ADMIN — SPIRONOLACTONE 50 MG: 25 TABLET, FILM COATED ORAL at 08:32

## 2019-01-01 RX ADMIN — MORPHINE SULFATE 2 MG: 2 INJECTION, SOLUTION INTRAMUSCULAR; INTRAVENOUS at 05:18

## 2019-01-01 RX ADMIN — MORPHINE SULFATE 2 MG: 2 INJECTION, SOLUTION INTRAMUSCULAR; INTRAVENOUS at 06:38

## 2019-01-01 RX ADMIN — DONEPEZIL HYDROCHLORIDE 5 MG: 5 TABLET, FILM COATED ORAL at 22:00

## 2019-01-01 RX ADMIN — DONEPEZIL HYDROCHLORIDE 5 MG: 5 TABLET, FILM COATED ORAL at 22:17

## 2019-01-01 RX ADMIN — LEVOTHYROXINE SODIUM 25 MCG: 25 TABLET ORAL at 05:11

## 2019-01-01 RX ADMIN — MORPHINE SULFATE 10 MG: 20 SOLUTION ORAL at 13:43

## 2019-01-01 RX ADMIN — GLYCOPYRROLATE 0.1 MG: 0.2 INJECTION, SOLUTION INTRAMUSCULAR; INTRAVENOUS at 05:24

## 2019-01-01 RX ADMIN — POLYETHYLENE GLYCOL 3350 17 G: 17 POWDER, FOR SOLUTION ORAL at 09:00

## 2019-01-01 RX ADMIN — HALOPERIDOL LACTATE 2 MG: 5 INJECTION INTRAMUSCULAR at 06:38

## 2019-01-01 RX ADMIN — SACUBITRIL AND VALSARTAN 1 TABLET: 24; 26 TABLET, FILM COATED ORAL at 08:32

## 2019-01-01 RX ADMIN — SACUBITRIL AND VALSARTAN 1 TABLET: 24; 26 TABLET, FILM COATED ORAL at 21:32

## 2019-01-01 RX ADMIN — HEPARIN SODIUM 5000 UNITS: 5000 INJECTION, SOLUTION INTRAVENOUS; SUBCUTANEOUS at 14:04

## 2019-01-01 RX ADMIN — LORAZEPAM 1 MG: 2 INJECTION INTRAMUSCULAR; INTRAVENOUS at 20:55

## 2019-01-01 RX ADMIN — FERROUS SULFATE TAB 325 MG (65 MG ELEMENTAL FE) 325 MG: 325 (65 FE) TAB at 12:00

## 2019-01-01 RX ADMIN — HALOPERIDOL 2 MG: 2 SOLUTION ORAL at 03:05

## 2019-01-01 RX ADMIN — Medication 10 ML: at 13:58

## 2019-01-01 RX ADMIN — FERROUS SULFATE TAB 325 MG (65 MG ELEMENTAL FE) 325 MG: 325 (65 FE) TAB at 17:32

## 2019-01-01 RX ADMIN — Medication 5 ML: at 06:43

## 2019-01-01 RX ADMIN — LEVOTHYROXINE SODIUM 25 MCG: 25 TABLET ORAL at 06:29

## 2019-01-01 RX ADMIN — MORPHINE SULFATE 10 MG: 20 SOLUTION ORAL at 09:18

## 2019-09-30 PROBLEM — R41.82 ALTERED MENTAL STATE: Status: ACTIVE | Noted: 2019-01-01

## 2019-09-30 NOTE — CONSULTS
Cardiology consultation: 
 
I was asked to assess Mr. Chema Freire in the [facility] emergency room. He was initially brought in for behavioral health assessment for probable involuntary admission because of dementia and inability to care for himself. He is discovered to have a history of cardiac failure with prior care in 1 of the hospital systems in [place]. He admits to having been on cardiac medications which he neglected to refill approximately 4 years ago. He is a former smoker. It is unclear when he stopped. He complains of almost constant shortness of breath but he seems content in a completely supine position. He admits to PND but he states that he remedies it rolling on his side and that he does not need to sit up to feel better. He denies chest pain. He is uncertain about syncope and this may be a possibility. Further history is not obtainable but he actually has insight into his lack of memory, apologizing for not being much help. On examination, he is a thin elderly male with substantial skeletal muscle wasting. He is partially edentulous. He is breathing comfortably supine with mild distention of his external jugular veins on both sides. There are no carotid bruits. Tibial pulses are severely diminished, feet are cool, and he has at least 2+ tibial edema. There is no skin breakdown or ulceration on his lower extremities. Lungs are clear with diminished breath sounds but no audible wheezing. There are no rales. Cardiac auscultation shows muffled and nearly absent S1 with systolic murmur both sides of the sternum without clear localization. Second heart sound is present, P2 might be a little bit louder than A2. There are no abnormal diastolic sounds. PMI is severely lateralized. Liver seems small but there may be some ascites with mild bland abdominal distention and diminished bowel sounds.   Gas tympany is restricted to the upper most medial portion of the abdomen. Fluid wave sign seems weakly positive. Blood pressure is elevated, 139/91 at admission, 130/100 during my exam.  Oxygen saturation is 100% on room air. He is mildly hypothermic and tachycardic at rest.  He is not in any subjective distress. Twelve-lead EKG acquired today shows sinus rhythm with delayed precordial R wave progression and anterolateral T wave inversion. There is a single ventricular premature contraction. There are no comparison tracings available. There is no acute change. Chemistries available so far show normal electrolytes, BUN of 31 with creatinine of 1.24. Serum calcium is normal.  Albumin is 3.1. SGPT and AST are elevated. Lactic acid is 2.4. Troponin is 0.07 with NT proBNP of 20,057. Hemoglobin is 11.7, white count is normal.  Differential is normal.  Chest x-ray shows gross cardiomegaly with a heart extending to the left in a horizontal position. There is a medium-sized freestanding hilar node on the right side with multiple areas of punctate calcification. There is no readily visible infiltrate: 
 
 
 
Impression: Hypertension Congestive cardiomyopathy The patient may be cirrhotic The absence of orthopnea suggests an element of right-sided heart failure The EKG does not suggest CAD Plan:  Bedside echo as soon as possible Very cautious diuresis with close attention to electrolytes and creatinine Initial control of blood pressure with a dihydropyridine until we see the echo Approach the calcified mediastinal node cautiously; differential includes sarcoid, old TB, and tumor Check ammonia level once to help differentiate encephalopathy Thank you for this consultation Terry Oconnor MD, [facility] [place]

## 2019-09-30 NOTE — ED TRIAGE NOTES
Pt presents to the ED via EMS undedr and ECO. Pt's sisiter reports that he is not caring for himself. Pt sister reports that he use to drink and since \"he lost his eye and since then has not been drinking like he use to. \" Pt denies SI or HI. Pt denies nay hallucinations. Pt reports feeling SOB x3 weeks. EMS reports that he was not covered in urine and stool but there was vomit on the floor.

## 2019-09-30 NOTE — ED NOTES
Verbal shift change report given to Jenni Benoit RN (oncoming nurse) by Kim Cortez RN (offgoing nurse). Report included the following information SBAR, ED Summary, MAR and Recent Results.

## 2019-09-30 NOTE — H&P
Hospitalist Admission Note NAME: Rosa Elena Juárez :  1950 MRN:  [id number] Room Number: [id number]/[id number]  @ [facility]  
 
Date/Time:  2019 5:50 PM 
 
Patient PCP: None 
______________________________________________________________________ Given the patient's current clinical presentation, I have a high level of concern for decompensation if discharged from the emergency department. Complex decision making was performed, which includes reviewing the patient's available past medical records, laboratory results, and x-ray films. My assessment of this patient's clinical condition and my plan of care is as follows. Assessment / Plan: Active Problems: 
  Altered mental state (2019) New onset Congestive Heart Failure, EF unknown Shortness of breath, orthopnea, PND, 3+ pedal edema, proBNP 20,057, CXR shows cardiomegaly, left sided pleural effusion, vascular congestion.  
 
- Cardiology consult - Will give Furosemide 20 mg IV 
- Strict Is and Os, 2 g Sodium cardiac diet, daily weights. 
- ECHO 
- Lipid panel, A1c 
- Aspirin 81 mg daily. Dyspnea Likely d/t congestive heart failure (see above). EKG not concerning for ischemia. Troponin leak noted. Low suspicion for acute infection. - Will get CTA Chest to r/o PE.  
- Management of CHF as above. Encephalopathy : 
Collateral history and clinical progression highly suggestive of dementia. Rule out delirium. CT shows cerebral atrophy, left occipital lobe encephalomalacia. Stroke ruled out. LFTs show mild transaminitis. Cr 1.24. ETOH < 10. Rule out infection. - Will get TSH, B12, HIV, RPR.  
- Will consider starting Donepezil. 
- UDS pending.  
- Urinalysis w/ reflex to culture. - Outpatient Geriatric follow up. - Procalcitonin Elevated lactic acid :  
Lactic acid 2.4. Likely due to hypoperfusion d/t congestive heart failure. - Diurese. Recheck lactate tomorrow. Elevated troponin : 
EKG shows sinus arrhythmia, T wave inversion in V5-V6. Troponin 0.07. Likely d/t congestive heart failure. Transaminitis :  
Lab Results Component Value Date/Time ALT (SGPT) 96 (H) 09/30/2019 04:19 PM  
 AST (SGOT) 56 (H) 09/30/2019 04:19 PM  
 Alk. phosphatase 97 09/30/2019 04:19 PM  
 Bilirubin, total 1.1 (H) 09/30/2019 04:19 PM  
suspect d/t congestive cardiac failure. - Will repeat LFT. Microcytic hypochromic anemia : 
Likely iron deficiency, suspect dietary in origin.  
 
- Iron profile, Folate, B12, Peripheral smear, Reticulocyte count There is no height or weight on file to calculate BMI. Code Status:  FULL Surrogate Decision Maker: sister, Viki Ramirez  [phone] Jc Clink [phone] DVT Prophylaxis: Hep SQ and SCD's 
GI Prophylaxis: not indicated Baseline:  unknown Subjective: CHIEF COMPLAINT:  ECO for not caring for self. HISTORY OF PRESENT ILLNESS:    
Chema Freire is a 78 y.o.  male with no known past medical history who presents to ED d/t concerns for failure to care for self. I spoke with Viki Ramirez [phone], patient's sister. She called the police on him today because the patient wouldn't let her into his home. He stated he was tired and hungry and did not open the door. For the last 3 months, he has not been eating food. He has also been really weak and mostly been seated in the chair. Prior to that, he was chronic heavy drinker about 1 pint of whiskey. She is not aware of any drug use. She also reports memory loss, poor awareness of surroundings or day/night orientation that started last year. For example, when she would call him during the day time, he would think it is the middle of the night. He was apparently a part of [employer] but he stopped following with appointments.   
 
Last November, he had acute pain in his left eye followed by loss of vision in that eye but he refused to go to a doctor Patient is calmly resting in bed. Reports shortness of breath on exertion, orthopnea (sleeps on his side), swelling of his feet and decreased appetite. He feels 'weak' and unable to walk or carry out activities of daily living. He is oriented to self and can name his family members but not oriented to time place or situation. History reviewed. No pertinent past medical history. History reviewed. No pertinent surgical history. Social History Tobacco Use  Smoking status: Former Smoker  Tobacco comment: quit 4 years ago Substance Use Topics  Alcohol use: Not Currently Comment: quit 3 years ago History reviewed. No pertinent family history. No Known Allergies Prior to Admission medications Not on File REVIEW OF SYSTEMS:    
Total of 12 systems reviewed as follows:   
    
General:  + loss of appetite, weight loss/gain, generalized weakness. Negative for fever, chills, sweats,  weight gain. Eyes:    + loss of vision in left eye. Negative for eye pain, double vision ENT:    rhinorrhea, pharyngitis Respiratory:   + shortness of breath, dyspnea on exertion, paroxysmal nocturnal dyspnea. Negative for cough, sputum production,  wheezing, pleuritic pain  
Cardiology:   + orthopnea, PND, edema. Negative for chest pain, palpitations, syncope Gastrointestinal:  Negative for abdominal pain , N/V, diarrhea, dysphagia, constipation, bleeding . Genitourinary:  Negative for frequency, urgency, dysuria, hematuria, incontinence. Muskuloskeletal :  Negative for arthralgia, myalgia, back pain. Hematology:  Negative for easy bruising, nose or gum bleeding, lymphadenopathy. Dermatological: Negative for rash, ulceration, pruritis, color change / jaundice. Endocrine:   Negative for hot flashes or polydipsia. Neurological:  + confusion, memory loss.  Negative for headache, dizziness, confusion, focal weakness, paresthesia, speech difficulties Psychological: Negative for Feelings of anxiety, depression, agitation Objective: VITALS:   
Visit Vitals BP (!) 130/100 (BP 1 Location: Left arm, BP Patient Position: At rest) Pulse 95 Temp 97.9 °F (36.6 °C) Resp 16 Wt 62.3 kg (137 lb 5 oz) SpO2 100% PHYSICAL EXAM: 
 
General:    Alert, cooperative, no distress, appears stated age. Muscle wasting noted. HEENT: Atraumatic, anicteric sclerae, pink conjunctivae No oral ulcers, mucosa moist, throat clear, dentition fair Neck:  Supple, symmetrical, distended external jugular, JVD+ Lungs:   Decreased breath sounds at left base, crackles at right base. Clear to auscultation bilaterally. No Wheezing or Rhonchi Chest wall:  No tenderness  No Accessory muscle use. Heart:   Regular rate rhythm, no murmurs/gallops/rubs. Abdomen:   Soft, non-tender. Not distended. Bowel sounds normal 
Extremities: No cyanosis. No clubbing,  Cool extremities, loss of hair,  
  Skin turgor normal, Capillary refill normal, Radial dial pulse 1+. 3+ bilateral lower extremity edema extending to the knees. Skin:     Not pale. Not Jaundiced  No rashes Psych:  Not depressed. Not anxious or agitated. Neurologic: EOMs intact. No facial asymmetry. No aphasia or slurred speech. Symmetrical strength, Sensation grossly intact. Alert and oriented only to self.  
 
______________________________________________________________________ Care Plan discussed with:  Patient/Family and Nurse Expected  Disposition:  SNF/LTC 
________________________________________________________________________ TOTAL TIME:  30 Minutes Critical Care Provided     Minutes non procedure based Comments Reviewed previous records  
>50% of visit spent in counseling and coordination of care  Discussion with patient and/or family and questions answered ________________________________________________________________________ Signed: Celina Wasserman MD 
 
Procedures: see electronic medical records for all procedures/Xrays and details which were not copied into this note but were reviewed prior to creation of Plan. LAB DATA REVIEWED:   
Recent Results (from the past 24 hour(s)) CBC WITH AUTOMATED DIFF Collection Time: 09/30/19  4:19 PM  
Result Value Ref Range WBC 5.1 4.1 - 11.1 K/uL  
 RBC 5.82 (H) 4.10 - 5.70 M/uL  
 HGB 11.7 (L) 12.1 - 17.0 g/dL HCT 39.0 36.6 - 50.3 % MCV 67.0 (L) 80.0 - 99.0 FL  
 MCH 20.1 (L) 26.0 - 34.0 PG  
 MCHC 30.0 30.0 - 36.5 g/dL RDW 25.2 (H) 11.5 - 14.5 % PLATELET 714 809 - 627 K/uL NRBC 0.6 (H) 0  WBC ABSOLUTE NRBC 0.03 (H) 0.00 - 0.01 K/uL NEUTROPHILS 66 32 - 75 % LYMPHOCYTES 26 12 - 49 % MONOCYTES 7 5 - 13 % EOSINOPHILS 1 0 - 7 % BASOPHILS 0 0 - 1 % IMMATURE GRANULOCYTES 0 0.0 - 0.5 % ABS. NEUTROPHILS 3.3 1.8 - 8.0 K/UL  
 ABS. LYMPHOCYTES 1.3 0.8 - 3.5 K/UL  
 ABS. MONOCYTES 0.4 0.0 - 1.0 K/UL  
 ABS. EOSINOPHILS 0.1 0.0 - 0.4 K/UL  
 ABS. BASOPHILS 0.0 0.0 - 0.1 K/UL  
 ABS. IMM. GRANS. 0.0 0.00 - 0.04 K/UL  
 DF SMEAR SCANNED    
 RBC COMMENTS DEMETRIS CELLS 3+ 
    
 RBC COMMENTS ANISOCYTOSIS 2+ 
    
 RBC COMMENTS HYPOCHROMIA 1+ METABOLIC PANEL, COMPREHENSIVE Collection Time: 09/30/19  4:19 PM  
Result Value Ref Range Sodium 141 136 - 145 mmol/L Potassium 4.4 3.5 - 5.1 mmol/L Chloride 106 97 - 108 mmol/L  
 CO2 22 21 - 32 mmol/L Anion gap 13 5 - 15 mmol/L Glucose 80 65 - 100 mg/dL BUN 31 (H) 6 - 20 MG/DL Creatinine 1.24 0.70 - 1.30 MG/DL  
 BUN/Creatinine ratio 25 (H) 12 - 20 GFR est AA >60 >60 ml/min/1.73m2 GFR est non-AA 58 (L) >60 ml/min/1.73m2 Calcium 9.6 8.5 - 10.1 MG/DL Bilirubin, total 1.1 (H) 0.2 - 1.0 MG/DL  
 ALT (SGPT) 96 (H) 12 - 78 U/L  
 AST (SGOT) 56 (H) 15 - 37 U/L Alk.  phosphatase 97 45 - 117 U/L  
 Protein, total 6.8 6.4 - 8.2 g/dL Albumin 3.1 (L) 3.5 - 5.0 g/dL Globulin 3.7 2.0 - 4.0 g/dL A-G Ratio 0.8 (L) 1.1 - 2.2 NT-PRO BNP Collection Time: 09/30/19  4:19 PM  
Result Value Ref Range NT pro-BNP 20,057 (H) 0 - 125 PG/ML  
TROPONIN I Collection Time: 09/30/19  4:19 PM  
Result Value Ref Range Troponin-I, Qt. 0.07 (H) <0.05 ng/mL ETHYL ALCOHOL Collection Time: 09/30/19  4:19 PM  
Result Value Ref Range ALCOHOL(ETHYL),SERUM <10 <10 MG/DL  
LACTIC ACID Collection Time: 09/30/19  4:19 PM  
Result Value Ref Range Lactic acid 2.4 (HH) 0.4 - 2.0 MMOL/L  
MAGNESIUM Collection Time: 09/30/19  4:19 PM  
Result Value Ref Range Magnesium 2.2 1.6 - 2.4 mg/dL PROTHROMBIN TIME + INR Collection Time: 09/30/19  4:19 PM  
Result Value Ref Range INR 1.4 (H) 0.9 - 1.1 Prothrombin time 14.3 (H) 9.0 - 11.1 sec PTT Collection Time: 09/30/19  4:19 PM  
Result Value Ref Range aPTT 28.9 22.1 - 32.0 sec  
 aPTT, therapeutic range     58.0 - 77.0 SECS  
EKG, 12 LEAD, INITIAL Collection Time: 09/30/19  4:31 PM  
Result Value Ref Range Ventricular Rate 92 BPM  
 Atrial Rate 92 BPM  
 P-R Interval 126 ms  
 QRS Duration 98 ms Q-T Interval 366 ms  
 QTC Calculation (Bezet) 452 ms Calculated P Axis 50 degrees Calculated R Axis 108 degrees Calculated T Axis -28 degrees Diagnosis Sinus rhythm with marked sinus arrhythmia Rightward axis T wave abnormality, consider lateral ischemia Abnormal ECG No previous ECGs available

## 2019-09-30 NOTE — ED NOTES
Pt presents to ED via EMS with [employer] due to ECO issued by . Pt's sister called the  stating pt was not caring for himself and was incontinent. Per EMS, pt was not covered in urine or feces, but there was vomit on the floor. Pt complaining of SOB and fatigue x3 weeks. Pt is alert and oriented x 4, RR even and unlabored, skin is warm and dry. Assesment completed and pt updated on plan of care. Emergency Department Nursing Plan of Care The Nursing Plan of Care is developed from the Nursing assessment and Emergency Department Attending provider initial evaluation. The plan of care may be reviewed in the ED Provider note. The Plan of Care was developed with the following considerations:  
Patient / Family readiness to learn indicated by:verbalized understanding Persons(s) to be included in education: patient Barriers to Learning/Limitations: possible altered mental status Signed Obinna Mcclellan [facility]   
9/30/2019   4:00 PM

## 2019-10-01 PROBLEM — I50.9 HEART FAILURE (HCC): Status: ACTIVE | Noted: 2019-01-01

## 2019-10-01 NOTE — PROGRESS NOTES
0710) Bedside shift change report given to Lana Schneider RN (oncoming nurse) by Magda Deleon RN (offgoing nurse). Report included the following information SBAR, Kardex and MAR.  
0830) One attempt at lab work. Echo at bedside 200) Discuss with TRENT Blas, pt gastro[facility]. 1100)  Dr. Enrique Vazquez at bedside. Pt wheeled downstairs for CT 
1140) pt walk with physical therapy 1235) two attempts at lab sticks 1249) GABY Carter, able to draw troponin but not other labs 1300) Call from lab, unable to process blood; recollect needed 1905) Bedside shift change report given to [facility], RN (oncoming nurse) by Lana Schneider RN and JERRI Mcclellan (offgoing nurse). Report included the following information SBAR, Kardex, MAR and Recent Results.

## 2019-10-01 NOTE — PROGRESS NOTES
Primary Nurse Rupa Ewing, GABY and Ariana RN performed a dual skin assessment on this patient No impairment noted Yosvany score is 16

## 2019-10-01 NOTE — PROGRESS NOTES
PHYSICAL THERAPY EVALUATION Patient: Chema Freire (71 y.o. male) Date: 10/1/2019 Primary Diagnosis: Altered mental state [R41.82] Precautions: fall ASSESSMENT Based on the objective data described below, the patient presents with decreased functional mobility,balance and with noted confusion. Pt seated EOB on arrival and requested to go to bathroom. Pt needed CGA for sit to stand transfer and stand to sit transfer. He ambulated 100' with 1 seated rest break, CGA no AD. Pt fatigued quickly and asked to sit down, O2 sat 95% on RA. Pt did reach for furniture and walls when available. Pt knew he was in the hospital, but said he was in [place], unsure of day and month. Pt needed assistance finding his room from the [place] and finding the bathroom in his room. He states that he lives alone with his sister checking on him and helping him with meals, he does not use AD. Will continue to assess safety awareness and functional independence. It appears he may need significantly more supervision if he is to return home. Current Level of Function Impacting Discharge (mobility/balance): CGA needed for mobility PLAN : 
Recommendations and Planned Interventions: bed mobility training, transfer training, gait training and therapeutic exercises Frequency/Duration: Patient will be followed by physical therapy:  2 times a week to address goals. Recommendation for discharge: (in order for the patient to meet his/her long term goals) Physical therapy at least 2 days/week in the home This discharge recommendation: 
Has not yet been discussed the attending provider and/or case management Equipment recommendations for successful discharge (if) home: to be determined SUBJECTIVE:  
Patient stated I'm ok.  OBJECTIVE DATA SUMMARY:  
HISTORY:   
History reviewed. No pertinent past medical history. History reviewed. No pertinent surgical history. Personal factors and/or comorbidities impacting plan of care: safety awarenss Home Situation Home Environment: Apartment One/Two Story Residence: One story Living Alone: Yes Support Systems: Family member(s) Current DME Used/Available at Home: None EXAMINATION/PRESENTATION/DECISION MAKING:  
Critical Behavior: 
Neurologic State: Alert Orientation Level: Disoriented to situation, Oriented to person, Oriented to place Cognition: Follows commands Hearing: Auditory Auditory Impairment: None Functional Mobility: 
Bed Mobility:  Not tested Transfers: 
Sit to Stand: Contact guard assistance Stand to Sit: Contact guard assistance Balance:  
Sitting: Intact Standing: Impaired Standing - Static: Good Standing - Dynamic : Fair Ambulation/Gait Training: 
Distance (ft): 100 Feet (ft) Ambulation - Level of Assistance: Contact guard assistance Gait Abnormalities: Decreased step clearance Base of Support: Narrowed Speed/Heather: Pace decreased (<100 feet/min) Activity Tolerance:  
Fair Please refer to the flowsheet for vital signs taken during this treatment. After treatment patient left in no apparent distress:  
Sitting in chair and Bed / chair alarm activated COMMUNICATION/EDUCATION:  
The patients plan of care was discussed with: Registered Nurse. Patient/family agree to work toward stated goals and plan of care. Thank you for this referral. 
David Putnam, PT Time Calculation: 25 mins

## 2019-10-01 NOTE — INTERDISCIPLINARY ROUNDS
IDR with Dr. Victoriano Moon (MD), Camila Moreno, RN (pharmacist), Connie Ibrahim (),Wu (volunteer), and Bhavik Mccracken (RN) to discuss plan of care including placement, PT&OT, lactic, daily weight

## 2019-10-01 NOTE — PROGRESS NOTES
[address] contact Zhanna Fernando [phone] on [facility]. Per [place] patient last seen at [facility] 11/5/2018 multiple calls to patient and a visit to patient home. Per [employer] notes stated \"Patient states I ain't coming back to [facility]" received dis-enrollement paperwork in the mail, however paper hs not been filled out completely by patient waiting on other documents. Last call to patient was in December 2018.

## 2019-10-01 NOTE — ED PROVIDER NOTES
EMERGENCY DEPARTMENT HISTORY AND PHYSICAL EXAM 
 
 
Date: 9/30/2019 Patient Name: Wilmer Wiggins History of Presenting Illness Chief Complaint Patient presents with  Altered mental status History Provided By: Patient HPI: Wilmer Wiggins, 78 y.o. male with unknown past medical history brought into the emergency department as an ECO for altered mental status. Per report, patient has had a steady decline in his mental status over the last few months and has become increasingly weak as well as having significant dyspnea on exertion over the last week. History was limited secondary to the patient being confused however most history was obtained from his sister. Patient also notes some associated PND and leg swelling however is denying any syncope, chest pain, cough, fevers, chills. Per sister patient \"lost his eye\" a few months ago and notes changes in his left eye are not acute. PCP: None Current Facility-Administered Medications Medication Dose Route Frequency Provider Last Rate Last Dose  sodium chloride (NS) flush 5-40 mL  5-40 mL IntraVENous Q8H Monica Humphrey MD      
 sodium chloride (NS) flush 5-40 mL  5-40 mL IntraVENous PRN Monica Humphrey MD      
 heparin (porcine) injection 5,000 Units  5,000 Units SubCUTAneous Q8H Monica Humphrey MD      
 acetaminophen (TYLENOL) tablet 650 mg  650 mg Oral Q4H PRN Monica Humphrey MD      
 ondansetron (ZOFRAN ODT) tablet 4 mg  4 mg Oral Q4H PRN Monica Humphrey MD      
 amLODIPine (NORVASC) tablet 5 mg  5 mg Oral DAILY Tacos Flores MD   5 mg at 09/30/19 2006 Past History Past Medical History: 
History reviewed. No pertinent past medical history. Past Surgical History: 
History reviewed. No pertinent surgical history. Family History: 
History reviewed. No pertinent family history. Social History: 
Social History Tobacco Use  Smoking status: Former Smoker  Tobacco comment: quit 4 years ago Substance Use Topics  Alcohol use: Not Currently Comment: quit 3 years ago  Drug use: Never Allergies: 
No Known Allergies Review of Systems Review of Systems Unable to perform ROS: Mental status change Physical Exam  
Physical Exam 
 
GENERAL:  no acute distress EYES: Left pupil is irregular with a hyphema ENT: Mucous membranes pink and moist. 
NECK: Supple LUNGS: Airway patent. Non-labored respirations. Breath sounds clear with crackles in the bases HEART: Regular rate and rhythm. 2+ pitting edema bilateral lower extremities. ABDOMEN: Non-distended and non-tender, without guarding or rebound. SKIN:  warm, dry EXTREMITIES: Without swelling, tenderness or deformity, symmetric with normal ROM 
NEUROLOGICAL: Alert, disoriented, strength appears equal in all 4 extremities. Diagnostic Study Results Labs - Recent Results (from the past 12 hour(s)) CBC WITH AUTOMATED DIFF Collection Time: 09/30/19  4:19 PM  
Result Value Ref Range WBC 5.1 4.1 - 11.1 K/uL  
 RBC 5.82 (H) 4.10 - 5.70 M/uL  
 HGB 11.7 (L) 12.1 - 17.0 g/dL HCT 39.0 36.6 - 50.3 % MCV 67.0 (L) 80.0 - 99.0 FL  
 MCH 20.1 (L) 26.0 - 34.0 PG  
 MCHC 30.0 30.0 - 36.5 g/dL RDW 25.2 (H) 11.5 - 14.5 % PLATELET 673 964 - 725 K/uL NRBC 0.6 (H) 0  WBC ABSOLUTE NRBC 0.03 (H) 0.00 - 0.01 K/uL NEUTROPHILS 66 32 - 75 % LYMPHOCYTES 26 12 - 49 % MONOCYTES 7 5 - 13 % EOSINOPHILS 1 0 - 7 % BASOPHILS 0 0 - 1 % IMMATURE GRANULOCYTES 0 0.0 - 0.5 % ABS. NEUTROPHILS 3.3 1.8 - 8.0 K/UL  
 ABS. LYMPHOCYTES 1.3 0.8 - 3.5 K/UL  
 ABS. MONOCYTES 0.4 0.0 - 1.0 K/UL  
 ABS. EOSINOPHILS 0.1 0.0 - 0.4 K/UL  
 ABS. BASOPHILS 0.0 0.0 - 0.1 K/UL  
 ABS. IMM. GRANS. 0.0 0.00 - 0.04 K/UL  
 DF SMEAR SCANNED    
 RBC COMMENTS DEMETRIS CELLS 3+ 
    
 RBC COMMENTS ANISOCYTOSIS 2+ 
    
 RBC COMMENTS HYPOCHROMIA 1+ METABOLIC PANEL, COMPREHENSIVE  Collection Time: 09/30/19  4:19 PM  
 Result Value Ref Range Sodium 141 136 - 145 mmol/L Potassium 4.4 3.5 - 5.1 mmol/L Chloride 106 97 - 108 mmol/L  
 CO2 22 21 - 32 mmol/L Anion gap 13 5 - 15 mmol/L Glucose 80 65 - 100 mg/dL BUN 31 (H) 6 - 20 MG/DL Creatinine 1.24 0.70 - 1.30 MG/DL  
 BUN/Creatinine ratio 25 (H) 12 - 20 GFR est AA >60 >60 ml/min/1.73m2 GFR est non-AA 58 (L) >60 ml/min/1.73m2 Calcium 9.6 8.5 - 10.1 MG/DL Bilirubin, total 1.1 (H) 0.2 - 1.0 MG/DL  
 ALT (SGPT) 96 (H) 12 - 78 U/L  
 AST (SGOT) 56 (H) 15 - 37 U/L Alk. phosphatase 97 45 - 117 U/L Protein, total 6.8 6.4 - 8.2 g/dL Albumin 3.1 (L) 3.5 - 5.0 g/dL Globulin 3.7 2.0 - 4.0 g/dL A-G Ratio 0.8 (L) 1.1 - 2.2 NT-PRO BNP Collection Time: 09/30/19  4:19 PM  
Result Value Ref Range NT pro-BNP 20,057 (H) 0 - 125 PG/ML  
TROPONIN I Collection Time: 09/30/19  4:19 PM  
Result Value Ref Range Troponin-I, Qt. 0.07 (H) <0.05 ng/mL ETHYL ALCOHOL Collection Time: 09/30/19  4:19 PM  
Result Value Ref Range ALCOHOL(ETHYL),SERUM <10 <10 MG/DL  
LACTIC ACID Collection Time: 09/30/19  4:19 PM  
Result Value Ref Range Lactic acid 2.4 (HH) 0.4 - 2.0 MMOL/L  
MAGNESIUM Collection Time: 09/30/19  4:19 PM  
Result Value Ref Range Magnesium 2.2 1.6 - 2.4 mg/dL PROTHROMBIN TIME + INR Collection Time: 09/30/19  4:19 PM  
Result Value Ref Range INR 1.4 (H) 0.9 - 1.1 Prothrombin time 14.3 (H) 9.0 - 11.1 sec PTT Collection Time: 09/30/19  4:19 PM  
Result Value Ref Range aPTT 28.9 22.1 - 32.0 sec  
 aPTT, therapeutic range     58.0 - 77.0 SECS  
EKG, 12 LEAD, INITIAL Collection Time: 09/30/19  4:31 PM  
Result Value Ref Range Ventricular Rate 92 BPM  
 Atrial Rate 92 BPM  
 P-R Interval 126 ms  
 QRS Duration 98 ms Q-T Interval 366 ms  
 QTC Calculation (Bezet) 452 ms Calculated P Axis 50 degrees Calculated R Axis 108 degrees Calculated T Axis -28 degrees Diagnosis Sinus rhythm with marked sinus arrhythmia Rightward axis T wave abnormality, consider lateral ischemia Abnormal ECG No previous ECGs available AMMONIA Collection Time: 09/30/19  7:29 PM  
Result Value Ref Range Ammonia <10 <32 UMOL/L  
PROCALCITONIN Collection Time: 09/30/19  7:29 PM  
Result Value Ref Range Procalcitonin 0.1 ng/mL Radiologic Studies -  
CTA CHEST W OR W WO CONT Final Result IMPRESSION:   
1. No evidence pulmonary embolism. 2. Moderate-sized left pleural effusion and small right pleural effusion. 3. Moderate amount of free intraperitoneal fluid, incompletely visualized. 4. Nodular stranding within the visualized left upper quadrant intraperitoneal  
fat. While this appearance can be seen with free intraperitoneal fluid, an  
infiltrative process such as peritoneal carcinomatosis cannot be excluded. XR CHEST PORT Final Result IMPRESSION: Mild left basilar atelectasis and probable small left pleural  
effusion. CT HEAD WO CONT Final Result IMPRESSION: Left occipital lobe encephalomalacia consistent with prior infarct. No acute intracranial hemorrhage, mass or infarct. CT Results  (Last 48 hours) 09/30/19 2106  CTA CHEST W OR W WO CONT Final result Impression:  IMPRESSION:   
1. No evidence pulmonary embolism. 2. Moderate-sized left pleural effusion and small right pleural effusion. 3. Moderate amount of free intraperitoneal fluid, incompletely visualized. 4. Nodular stranding within the visualized left upper quadrant intraperitoneal  
fat. While this appearance can be seen with free intraperitoneal fluid, an  
infiltrative process such as peritoneal carcinomatosis cannot be excluded. Narrative:  INDICATION: Chest pain. CT pulmonary angiogram is performed with 2.5 mm collimation. 100 mL of nonionic IV Isovue-370 was administered for exam. 3D coronal and sagittal postprocessed images were performed for this examination. CT dose reduction was achieved through use of a standardized protocol tailored  
for this examination and automatic exposure control for dose modulation. Chest:   
   
CTPA: The pulmonary arteries are well opacified and there is no evidence of  
pulmonary embolism. Lymph nodes: There is no axillary, mediastinal or hilar lymphadenopathy. Heart: The heart is normal in the size and there is no pericardial effusion. Lungs/pleura: There is a moderate-sized left pleural effusion and a small right  
pleural effusion. There is very mild bibasilar atelectasis Bones: No lytic or sclerotic osseous lesion is visualized. Upper abdomen: There is a moderate amount of incompletely visualized  
intraperitoneal fluid. There is nodular stranding within the visualized left  
upper quadrant intraperitoneal fat. There is a 1.8 cm right renal cyst.  
   
  
 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 No acute intracranial hemorrhage, mass or infarct. Narrative:  INDICATION: Altered mental status. Exam: Noncontrast CT of the brain is performed with 5 mm collimation. CT dose reduction was achieved with the use of the standardized protocol  
tailored for this examination and automatic exposure control for dose  
modulation. FINDINGS: There is mild diffuse cortical atrophy There is no acute intracranial  
hemorrhage, mass, mass effect or herniation. Ventricular system is normal. There  
is left occipital encephalomalacia consistent with prior infarct. There is no  
evidence of acute territorial infarct. The mastoid air cells are well  
pneumatized. The visualized paranasal sinuses are normal.  
   
  
  
 
CXR Results  (Last 48 hours) 09/30/19 1709  XR CHEST PORT Final result Impression:  IMPRESSION: Mild left basilar atelectasis and probable small left pleural  
effusion. Narrative:  INDICATION: Altered mental status. Portable AP upright view of the chest.  
   
There is no prior study for direct comparison. Cardiomediastinal silhouette is within normal limits. There is mild left basilar  
atelectasis. There is blunting of the left costophrenic angle, likely  
representing small left pleural effusion. There is no pneumothorax; skin folds  
overlie the left hemithorax. Osseous structures are diffusely demineralized. Medical Decision Making I am the first provider for this patient. I reviewed the vital signs, available nursing notes, past medical history, past surgical history, family history and social history. Vital Signs-Reviewed the patient's vital signs. Patient Vitals for the past 12 hrs: 
 Temp Pulse Resp BP SpO2  
09/30/19 2225 96.8 °F (36 °C) 94 16 (!) 135/100 100 % 09/30/19 2009 96.8 °F (36 °C) 95 16 (!) 149/110 99 % 09/30/19 2006  96  (!) 149/110   
09/30/19 1738    (!) 130/100   
09/30/19 1556 97.9 °F (36.6 °C) 95 16 (!) 139/91 100 % EKG interpretation: (Preliminary) Rhythm: normal sinus rhythm . Rate (approx.): 92; Axis: normal; P wave: normal; QRS interval: normal ; ST/T wave: T inversions inferior lateral leads. No previous EKG for comparison;  was interpreted by Julien Loza MD,ED Provider. Records Reviewed: Nursing Notes and Old Medical Records Provider Notes (Medical Decision Making): On presentation, the patient is disheveled appearing in no acute distress with reassuring l vital signs. Based on my history and exam the differential diagnosis for this patient includes infection, metabolic abnormality, progressive dementia, pneumonia, heart failure, anemia, ACS. No obvious focal deficits to suggest an acute stroke.   There was a left hyphema noted however per patient's sister this is not an acute finding any has been evaluated by ophthalmology for this. History and work-up are suggestive of undiagnosed heart failure. We will begin gentle diuresis in the emergency department and plan to consult with cardiology. Patient will likely require admission. ED Course:  
Initial assessment performed. The patients presenting problems have been discussed, and they are in agreement with the care plan formulated and outlined with them. I have encouraged them to ask questions as they arise throughout their visit. PROGRESS: 
The patient has been re-evaluated and sx have improved. Reviewed available results with patient and have counseled them on diagnosis and care plan. They have expressed understanding, and all their questions have been answered. They agree with plan for admission. CONSULT: Spoke with  [facility], cardiology, he will evaluate the patient in the emergency department. CONSULT: 
Jerry Dunn MD spoke with the hospitalist.  Discussed HPI and PE, available diagnostic tests and clinical findings. He is in agreement with care plans as outlined and will evaluate for admission Admit Note Patient is being admitted to the hospitalist The results of their tests and reasons for their admission have been discussed with them and/or available family. They convey agreement and understanding for the need to be admitted and for their admission diagnosis. Consultation has been made with the inpatient physician specialist for hospitalization. Disposition: 
admission PLAN: 
1. Admit Diagnosis Clinical Impression: 1. Congestive heart failure, unspecified HF chronicity, unspecified heart failure type ([address] 2. Pleural effusion 3. Altered mental status, unspecified altered mental status type 4. Hyphema of left eye

## 2019-10-01 NOTE — ED NOTES
TRANSFER - OUT REPORT: 
 
Verbal report given to Verenice Jc RN (name) on Elzbieta Self  being transferred to [place] [id number] (unit) for routine progression of care Report consisted of patients Situation, Background, Assessment and  
Recommendations(SBAR). Information from the following report(s) SBAR was reviewed with the receiving nurse. Lines:  
Peripheral IV 09/30/19 Left Forearm (Active) Site Assessment Clean, dry, & intact 9/30/2019  4:35 PM  
Phlebitis Assessment 0 9/30/2019  4:35 PM  
Infiltration Assessment 0 9/30/2019  4:35 PM  
Dressing Status Clean, dry, & intact 9/30/2019  4:35 PM  
Dressing Type Transparent 9/30/2019  4:35 PM  
Hub Color/Line Status Blue;Flushed 9/30/2019  4:35 PM  
   
Peripheral IV 09/30/19 Left Antecubital (Active) Site Assessment Clean, dry, & intact 9/30/2019  5:48 PM  
Phlebitis Assessment 0 9/30/2019  5:48 PM  
Infiltration Assessment 0 9/30/2019  5:48 PM  
Dressing Status Clean, dry, & intact 9/30/2019  5:48 PM  
Dressing Type Transparent 9/30/2019  5:48 PM  
Hub Color/Line Status Blue;Flushed 9/30/2019  5:48 PM  
   
Peripheral IV 09/30/19 Left Antecubital (Active) Site Assessment Clean, dry, & intact 9/30/2019  8:16 PM  
Phlebitis Assessment 0 9/30/2019  8:16 PM  
Infiltration Assessment 0 9/30/2019  8:16 PM  
Dressing Status Clean, dry, & intact 9/30/2019  8:16 PM  
Dressing Type Transparent 9/30/2019  8:16 PM  
Hub Color/Line Status Pink;Flushed;Patent 9/30/2019  8:16 PM  
Action Taken Blood drawn 9/30/2019  8:16 PM  
  
 
Opportunity for questions and clarification was provided. Patient transported with: 
 [employer]

## 2019-10-01 NOTE — PROGRESS NOTES
TRANSFER - IN REPORT: 
 
Verbal report received from [facility] on Misti Looney  being received from ED(unit) for routine progression of care Report consisted of patients Situation, Background, Assessment and  
Recommendations(SBAR). Information from the following report(s) Kardex, ED Summary, Procedure Summary, Intake/Output, MAR and Recent Results was reviewed with the receiving nurse. Opportunity for questions and clarification was provided. Assessment completed upon patients arrival to unit and care assumed.

## 2019-10-01 NOTE — PROGRESS NOTES
Hospitalist Progress Note NAME: Virgia Collet :  1950 MRN:  [id number] Room Number:  342/96  @ [facility] Summary: 78 y.o. male whom presented on 2019 with Assessment / Plan: 
New onset Congestive Heart Failure, EF unknown Shortness of breath, orthopnea, PND, 3+ pedal edema, proBNP 20,057, CXR shows cardiomegaly, left sided pleural effusion, vascular congestion.  
  
- Cardiology consult - Strict Is and Os, 2 g Sodium cardiac diet, daily weights. 
- ECHO pending - Lipid panel, A1c 
- Aspirin 81 mg daily   
  
  
Dyspnea Likely d/t congestive heart failure (see above) and moderate left sided pleural effusion. EKG not concerning for ischemia. Troponin leak noted. Low suspicion for acute infection, procalcitonin 0.1. No fevers or leukocytosis noted. - Will repeat troponin. 
- Hold diuresis for now. - ECHO result pending.  
- IR guided thoracentesis of left pleural effusion, diagnostic and therapeutic.  
  
  
  
Encephalopathy : 
Collateral history and clinical progression highly suggestive of dementia. There might be a component of delirium d/t heart failure, hypovolemia. CT shows cerebral atrophy, left occipital lobe encephalomalacia. Stroke ruled out. LFTs show mild transaminitis. Cr 1.24. ETOH < 10. Rule out infection (CXR, CT Chest does not show infiltrate, UA negative for infection. UDS negative. .  
  
- Will get TSH, B12, HIV, RPR.  
- Will consider starting Donepezil. 
 
 
 
  
Elevated lactic acid :  
Likely due to hypoperfusion d/t congestive heart failure as well as intravascular volume depletion. Delicate fluid balance. He was administered furosemide yesterday with worsening of lactic acid.  
  
- Will provide gentle hydration with careful monitoring of fluid status.  
  
  
Elevated troponin : 
EKG shows sinus arrhythmia, T wave inversion in V5-V6. Troponin 0.07.  Likely d/t congestive heart failure.  
  
  
  
 Transaminitis :  
     
Lab Results Component Value Date/Time  
  ALT (SGPT) 96 (H) 09/30/2019 04:19 PM  
  AST (SGOT) 56 (H) 09/30/2019 04:19 PM  
  Alk. phosphatase 97 09/30/2019 04:19 PM  
  Bilirubin, total 1.1 (H) 09/30/2019 04:19 PM  
suspect d/t congestive cardiac failure.  
  
- Will repeat LFT tomorrow.  
  
  
Microcytic hypochromic anemia : 
Likely iron deficiency, suspect dietary in origin, long standing hx of alcoholism. Patient has not had age appropriate cancer screening.  
  
- Iron profile, Folate, B12, Peripheral smear, Reticulocyte count pending 
  
  
  
There is no height or weight on file to calculate BMI. Code Status:  FULL Surrogate Decision Maker: sisterJannet  [phone] Radha Anton [phone] 
  
DVT Prophylaxis: Hep SQ and SCD's 
GI Prophylaxis: not indicated 
  
Baseline:  unknown Subjective: Chief Complaint / Reason for Physician Visit He does not know where he is. He said his sister visited him yesterday. He is able to provide accurate details about remote past but has anterograde amnesia. Discussed with RN events overnight. Reports shortness of breath, dyspnea on exertion, generalized weakness. Review of Systems: No fevers, chills, appetite change, cough, sputum production, shortness of breath, dyspnea on exertion, nausea, vomitting, diarrhea, constipation, chest pain, leg edema, abdominal pain, joint pain, rash, itching. Tolerating diet. Objective: VITALS:  
Last 24hrs VS reviewed since prior progress note. Most recent are: 
Patient Vitals for the past 24 hrs: 
 Temp Pulse Resp BP SpO2  
10/01/19 1138    130/89 100 % 10/01/19 0931  89  125/86   
10/01/19 0813 96.3 °F (35.7 °C) 92 16 (!) 132/93 100 % 10/01/19 0534 96 °F (35.6 °C) 96 18 (!) 145/108 96 % 09/30/19 2225 96.8 °F (36 °C) 94 16 (!) 135/100 100 % 09/30/19 2009 96.8 °F (36 °C) 95 16 (!) 149/110 99 % 09/30/19 2006  96  (!) 149/110  09/30/19 1738    (!) 130/100   
09/30/19 1556 97.9 °F (36.6 °C) 95 16 (!) 139/91 100 % No intake or output data in the 24 hours ending 10/01/19 1218 PHYSICAL EXAM: 
General: WD, WN. Alert, cooperative, no acute distress EENT:  EOMI. Anicteric sclerae. MMM Resp:  Decreased breath sounds at right lower lung field and left lower and middle lung fields, mild crackles in right base, no accessory muscle use. CV:  Regular rate rhythm,  normal S1/S2, no murmurs rubs gallops, 3+ bilaterally symmetrical lower extremity edema GI:  Soft, Non distended, Non tender. +Bowel sounds Neurologic:  Alert and oriented X 1, normal speech, no focal neurological deficit with the exception of blindness in left eye Psych:   Not anxious nor agitated Skin:  No rashes. No jaundice Reviewed most current lab test results and cultures  YES Reviewed most current radiology test results   YES Review and summation of old records today    NO Reviewed patient's current orders and MAR    YES 
PMH/ reviewed - no change compared to H&P 
________________________________________________________________________ Care Plan discussed with: 
  Comments Patient  x Family   x  
RN  x Care Manager  x Consultant   x Multidiciplinary team rounds were held today with , nursing, pharmacist and clinical coordinator. Patient's plan of care was discussed; medications were reviewed and discharge planning was addressed. ________________________________________________________________________ Total NON critical care TIME:  20  Minutes Total CRITICAL CARE TIME Spent:   Minutes non procedure based Comments >50% of visit spent in counseling and coordination of care    
________________________________________________________________________ Giacomo Max MD  
 
Procedures: see electronic medical records for all procedures/Xrays and details which were not copied into this note but were reviewed prior to creation of Plan. LABS: 
I reviewed today's most current labs and imaging studies. Pertinent labs include: 
Recent Labs  
  09/30/19 
1619 WBC 5.1 HGB 11.7* HCT 39.0  Recent Labs  
  09/30/19 
1619   
K 4.4  
 CO2 22 GLU 80 BUN 31* CREA 1.24  
CA 9.6 MG 2.2 ALB 3.1* TBILI 1.1*  
SGOT 56* ALT 96* INR 1.4* Signed: Tod Hernandez MD

## 2019-10-01 NOTE — PROGRESS NOTES
Pt stuck multiple times by two different nurses. Able to obtain only ew tubes. Not all the labs were done.

## 2019-10-01 NOTE — PROGRESS NOTES
Cardiology: 
 
Crissy Sloan is complaining of shortness of breath with trivial activity and in the supine position. The increase in distress may have been related to mild hydration since admission. He is oriented as to place but not well oriented to situation and he requires repeated orientation training specifically for safety and fall prevention. He is quite unsteady on his feet and he is unable to void without standing. He denies chest pain and he does not offer any other complaints. Lungs are clear without rales or wheezing but he does have diminished breath sounds everywhere. There is no stridor or unusual airway noise. Jugular veins fill up to the standing position without pulsation. There is no carotid bruit. Cardiac auscultation shows regular rhythm with absent S1 and a systolic murmur diffusely located on both sides of the sternum. S2 is more normal but P2 may be louder than A2. There is no audible gallop. He still has 2+ tibial edema. Blood pressure remains elevated most of the time with a high this admission of 149/110 and a low of 130/89. He was started cautiously on amlodipine last night with a plan to adjust his medications after the echocardiographic result is available. The echo shows a severely dilated LV with diffuse hypokinesis, estimated ejection fraction under 10%. There is a large spherical mass in the left ventricular apex projecting into the LV cavity. It appears to be firmly attached to an area of fibrosis in the LV apex. The mass is about 2 cm in diameter and it is hollow with a fluid density center with minimal oral contrast in the cavity inside the mass suggesting either stagnation with high viscosity or thrombus. This structure more likely represents thrombus or granuloma with a necrotic center than a cyst.  It is quite thick walled and it appears stable. Otherwise there is no evidence of intracavitary thrombosis.   Cardiac index is most likely critically depressed. There is no significant valvular disease. There is a large pleural effusion and there may be an abdominal effusion as well. Impression: End-stage congestive cardiomyopathy Large granuloma in the right hilum, probable granuloma in the distal LV cavity Hypertension which at least affords an avenue for treatment Granulomatous findings suggest sarcoidosis The patient is incompetent and extremely unlikely to show ongoing compliance with care unless family assistance can be enlisted. 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 
  [employer] with social service exploration of feasibility of continued treatment Detailed conversation with family to decide if any form of post discharge care or supervision are a possibility Cognitive status and family engagement will need to be weighed in the process of considering advanced heart failure therapy Thank you for the ability to be of assistance. This is a complex situation with significant ethical nuances to consider Oscar Lagos MD, [facility] [place]

## 2019-10-01 NOTE — ED NOTES
Relayed to the RN taking over care of patient that the heparin was not verified by pharmacy so it was not given in the ED. Also relayed to the nurse that the MD stated there was no need to keep the TB precautions due to the patient not having any risk factors, no cough and nothing was flagged in his chart. Also the scans did not show TB. [employer] nurse stated she was okay with us sending the patient up without a mask.

## 2019-10-01 NOTE — PROGRESS NOTES
Pharmacy Medication Reconciliation Recommendations/Findings:  
 
Patient's sister Orest Halsted) stated that her brother has not taken any medications in over 6 months. 
 
 
-Clarified PTA med list with patient's sister Orest Halsted; [phone]) and Rx Query. PTA medication list was corrected to the following:  
 
None Thank you, Jacky Miller, Pharm. D. 
[phone]

## 2019-10-02 NOTE — WOUND CARE
Wound care consult from the floor for drainage from buttock wound. Chart reviewed and assessed the patient. This is a 78year old male patient admitted for Heartfailure and left pleural effusion. There is a pin hole oening in his left inner thigh and another one in the sacral area not much of drainage. Clean the area and collected a wound swab. Applied Mepilex foam to cover the area. Will follow up if needed. Tacho Laureano RN, BSN,C

## 2019-10-02 NOTE — PROGRESS NOTES
Spoke with Dr. Perico Mccoy regarding patient status. Pt BP in right arm 85/53. Heart rate 85 and BP in legt arm 97/66 heart rate 85 with new order of coreg due. RN notified Dr. Perico Mccoy of BP and heart rate. Per Dr. Perico Mccoy RN is to administer this dose and monitor patient. If patient becomes more hypotensive call Dr. Perico Mccoy directly on his cell phone: [phone].

## 2019-10-02 NOTE — PROGRESS NOTES
Problem: Mobility Impaired (Adult and Pediatric) Goal: *Acute Goals and Plan of Care (Insert Text) Description FUNCTIONAL STATUS PRIOR TO ADMISSION: Patient lived alone with sister helping as needed,without use of DME, HOME SUPPORT PRIOR TO ADMISSION: The patient lived alone with family to provide assistance. Physical Therapy Goals Initiated 10/1/2019 1. Patient will move from supine to sit and sit to supine , scoot up and down and roll side to side in bed with independence within 7 day(s). 2.  Patient will transfer from bed to chair and chair to bed with independence using the least restrictive device within 7 day(s). 3.  Patient will perform sit to stand with independence within 7 day(s). 4.  Patient will ambulate with modified independence for 300 feet with the least restrictive device within 7 day(s). Outcome: Progressing Towards Goal 
PHYSICAL THERAPY TREATMENT Patient: Vamshi Baker (71 y.o. male) Date: 10/2/2019 Diagnosis: Altered mental state [R41.82] Heart failure ([facility] [address] [I50.9] <principal problem not specified> Precautions:   
Chart, physical therapy assessment, plan of care and goals were reviewed. ASSESSMENT Patient continues with skilled PT services and is progressing towards goals. Pt sleeping on arrival and required encouragement to participate, very drowsy. Pt needed CGA for bed mobility and sit to stand transfers. He ambulated 100' without AD, but he reaches for furniture and walls when available, dynamic balance is fair. While walking on RA, O2 sats decreased from 100% to 87%, recovered quickly to 100% with rest.  Pt is only oriented to person this AM.  Pt will need 24/7 supervision from family, unable to care for himself. [facility] to address mobility and safety. Current Level of Function Impacting Discharge (mobility/balance): Pt confused, requires instructions for basic functional mobility Other factors to consider for discharge: Poor safety awareness PLAN : 
Patient continues to benefit from skilled intervention to address the above impairments. Continue treatment per established plan of care. to address goals. Recommendation for discharge: (in order for the patient to meet his/her long term goals) Physical therapy at least 2 days/week in the home AND ensure assist and/or supervision for safety with This discharge recommendation: 
Has not yet been discussed the attending provider and/or case management Equipment recommendations for successful discharge (if) home: to be determined SUBJECTIVE:  
Patient stated Can I just sleep a little.  OBJECTIVE DATA SUMMARY:  
Critical Behavior: 
Neurologic State: Alert Orientation Level: Oriented to person, Disoriented to time, Disoriented to situation, Disoriented to place Cognition: Decreased attention/concentration, Decreased command following, Memory loss Functional Mobility Training: 
Bed Mobility: 
Rolling: Stand-by assistance Supine to Sit: Contact guard assistance Transfers: 
Sit to Stand: Contact guard assistance Stand to Sit: Contact guard assistance Balance: 
Sitting: Intact Standing: Impaired Standing - Static: Good Standing - Dynamic : Fair Ambulation/Gait Training: 
Distance (ft): 100 Feet (ft) Ambulation - Level of Assistance: Contact guard assistance Gait Abnormalities: Decreased step clearance Base of Support: Narrowed Speed/Heather: Pace decreased (<100 feet/min) Activity Tolerance:  
Fair, fatigues quickly Please refer to the flowsheet for vital signs taken during this treatment. After treatment patient left in no apparent distress:  
Sitting in chair, Call bell within reach and Bed / chair alarm activated COMMUNICATION/COLLABORATION:  
The patients plan of care was discussed with: Occupational Therapist 
 
Danyelle Galvan PT Time Calculation: 20 mins

## 2019-10-02 NOTE — PROGRESS NOTES
Problem: Self Care Deficits Care Plan (Adult) Goal: *Acute Goals and Plan of Care (Insert Text) Description FUNCTIONAL STATUS PRIOR TO ADMISSION: Patient is poor historian due to confusion. Based on chart and discussion with staff, Patient was independent and active without use of DME. Patient was independent for basic ADLs. He reports his sister comes in to check on him and brings him food. HOME SUPPORT: The patient lived alone with sister to provide assistance. Occupational Therapy Goals Initiated 10/2/2019 1. Patient will perform self-feeding with modified independence within 7 day(s). 2.  Patient will perform grooming, standing at sink, with modified independence within 7 day(s). 3.  Patient will perform lower body dressing with supervision/set-up within 7 day(s). 4.  Patient will perform toilet transfers with supervision/set-up within 7 day(s). 5.  Patient will perform all aspects of toileting with supervision/set-up within 7 day(s). Outcome: Progressing Towards Goal 
  
OCCUPATIONAL THERAPY EVALUATION Patient: Shy Yun (48 y.o. male) Date: 10/2/2019 Primary Diagnosis: Altered mental state [R41.82] Heart failure ([facility] [address] [I50.9] Precautions: Fall ASSESSMENT Based on the objective data described below, the patient presents with confusion, decreased attention and concentration, decreased safety awareness, decreased endurance, impaired balance, weakness, decreased coordination, and decreased pace impacting ADL performance and mobility following admission for AMS and heart failure. Patient requires increased time for processing commands and for all activities. Verbal and tactile cues provided to sequence and continue with activities often for safety (I.e. Pulling up brief prior to walking once standing up from toilet). Unclear if patient has safe depth perception, noted to have difficulty reaching out for item when provided to him.  He is on RA for duration of session with saturations at 100% at rest and decreased down to 90% with activity. Patient only oriented to himself this session, thinks he is at home and is waiting for his brother to come. At this time, patient is unsafe to return home alone and will require 24 hour assist/supervision at discharge. Current Level of Function Impacting Discharge (ADLs/self-care): patient requires overall CGA to min A for ADLs due to cognitive status Functional Outcome Measure: The patient scored Total: 35/100 on the Barthel Index outcome measure which is indicative of 65% impaired ability to care for basic self needs/dependency on others; inferred 100% dependency on others for instrumental ADLs. Other factors to consider for discharge: confusion; lives alone; poor historian Patient will benefit from skilled therapy intervention to address the above noted impairments. PLAN : 
Recommendations and Planned Interventions: self care training, functional mobility training, therapeutic exercise, balance training, visual/perceptual training, therapeutic activities, cognitive retraining, endurance activities, patient education, home safety training and family training/education Frequency/Duration: Patient will be followed by occupational therapy 1 time a week to address goals. Recommendation for discharge: (in order for the patient to meet his/her long term goals) Occupational therapy at least 2 days/week in the home AND ensure assist and/or supervision for safety with all mobility and ADLs/IADLs due to current cognitive status. If family unable to provide this level of assist, patient may require SNF This discharge recommendation: 
Has not yet been discussed the attending provider and/or case management Equipment recommendations for successful discharge (if) home: TBD - unclear what patient has available to him at home SUBJECTIVE:  
Patient stated Tanya Devlin is my brother?  OBJECTIVE DATA SUMMARY:  
 HISTORY:  
History reviewed. No pertinent past medical history. History reviewed. No pertinent surgical history. Expanded or extensive additional review of patient history:  
 
Home Situation Home Environment: Apartment One/Two Story Residence: One story Living Alone: Yes Support Systems: Family member(s) Current DME Used/Available at Home: None EXAMINATION OF PERFORMANCE DEFICITS: 
Cognitive/Behavioral Status: 
Neurologic State: Confused Orientation Level: Oriented to person Cognition: Decreased attention/concentration;Memory loss;Poor safety awareness Perception: (decreased depth perception) Perseveration: Perseverates during ADLS(perseverated on unfolding wipe) Safety/Judgement: Decreased awareness of environment;Decreased awareness of need for assistance;Decreased awareness of need for safety;Decreased insight into deficits Hearing: Auditory Auditory Impairment: None Vision/Perceptual:   
Tracking: (difficult to assess due to decreased attention) Corrective Lenses: (patient reporting no visual correction, only \"sunglasses\") Patient observed to have difficulty reaching for ADL items when presented to him. Range of Motion: 
AROM: Generally decreased, functional In bilateral UEs Strength: 
Strength: Generally decreased, functional In bilateral UEs Coordination: 
Coordination: Generally decreased, functional In bilateral UEs Fine Motor Skills-Upper: Left Impaired;Right Impaired Gross Motor Skills-Upper: Left Impaired;Right Impaired Tone: 
Tone: Normal 
 
Balance: 
Sitting: Intact Standing: Impaired Standing - Static: Good Standing - Dynamic : Fair Functional Mobility and Transfers for ADLs: 
Bed Mobility: 
Rolling: Stand-by assistance Supine to Sit: Contact guard assistance Transfers: 
Sit to Stand: Contact guard assistance Stand to Sit: Contact guard assistance Bathroom Mobility: Contact guard assistance Toilet Transfer : Contact guard assistance;Minimum assistance(cues for grab bar use) ADL Assessment: 
Feeding: Minimum assistance; Additional time(occasional assist to open containers and apply condiments) Oral Facial Hygiene/Grooming: Minimum assistance; Additional time(infer based on observed activity during self-feeding; cues to continue with activity) Bathing: Minimum assistance; Additional time Upper Body Dressing: Contact guard assistance;Minimum assistance; Additional time Lower Body Dressing: Minimum assistance; Additional time(able to tailor sit; cues to sequence) Toileting: Minimum assistance; Additional time(cues to sequence) ADL Intervention and task modifications: 
Feeding Container Management: Minimum assistance(cues to lift black lid off of plate; A to open milk carton; able to open salt, but then dumped it all in one spot on his food) Utensil Management: Modified independent Food to Mouth: Modified independent Drink to Mouth: Modified independent Lower Body Dressing Assistance Protective Undergarmet: Minimum assistance(cues to take off dirty brief before donning new one; increased time) Socks: Set-up; Supervision(able to tailor sit) Position Performed: Seated edge of bed; Other (comment)(socks at EOB; brief while seated on toilet) Cues: Don;Doff;Verbal cues provided; Tactile cues provided;Physical assistance Toileting Bowel Hygiene: Stand-by assistance(seated on commode) Clothing Management: Minimum assistance(cues to pull down brief before sitting) Cues: Verbal cues provided; Tactile cues provided Noted bright red blood on wipe after performing bottom hygiene. RN notified. Patient requires cues to continue with activity, becomes perseverative on unfolding wipe for a few minutes before continuing with tasks. Instructed on grab bar use. Patient requires verbal cues to pull brief down before sitting and to pull it up once standing from toilet. Cognitive Retraining Safety/Judgement: Decreased awareness of environment;Decreased awareness of need for assistance;Decreased awareness of need for safety;Decreased insight into deficits Functional Measure: 
Barthel Index: 
 
Bathin Bladder: 5 Bowels: 5 Groomin Dressin Feedin Mobility: 0 Stairs: 0 Toilet Use: 5 Transfer (Bed to Chair and Back): 10 Total: 35/100 The Barthel ADL Index: Guidelines 1. The index should be used as a record of what a patient does, not as a record of what a patient could do. 2. The main aim is to establish degree of independence from any help, physical or verbal, however minor and for whatever reason. 3. The need for supervision renders the patient not independent. 4. A patient's performance should be established using the best available evidence. Asking the patient, friends/relatives and nurses are the usual sources, but direct observation and common sense are also important. However direct testing is not needed. 5. Usually the patient's performance over the preceding 24-48 hours is important, but occasionally longer periods will be relevant. 6. Middle categories imply that the patient supplies over 50 per cent of the effort. 7. Use of aids to be independent is allowed. Michaelle Lin., Barthel, DDelfinW. (1648). Functional evaluation: the Barthel Index. [address] [facility] (14)2. Yamilka Jackson, GILMAJDelfinMDelfinF, Debra Persaud., Liza Gonzalez., [place], [address] [place] [address] (). Measuring the change indisability after inpatient rehabilitation; comparison of the responsiveness of the Barthel Index and Functional [place] Measure. Journal of Neurology, Neurosurgery, and Psychiatry, 66(4), 973-053. Marty Blair N.NETTE.A, MARLENE Cates, & Eva Mcclellan, MDelfinA. (2004.) Assessment of post-stroke quality of life in cost-effectiveness studies: The usefulness of the Barthel Index and the EuroQoL-5D. [facility], 13, 567-20 Occupational Therapy Evaluation Charge Determination History Examination Decision-Making LOW Complexity : Brief history review  HIGH Complexity : 5 or more performance deficits relating to physical, cognitive , or psychosocial skils that result in activity limitations and / or participation restrictions MEDIUM Complexity : Patient may present with comorbidities that affect occupational performnce. Miniml to moderate modification of tasks or assistance (eg, physical or verbal ) with assesment(s) is necessary to enable patient to complete evaluation Based on the above components, the patient evaluation is determined to be of the following complexity level: LOW Pain Rating: 
Patient denied pain this session. Activity Tolerance:  
Fair, requires rest breaks and desaturates with exertion (see above), Reports fatigue during limited mobility, on RA for duration of session. Please refer to the flowsheet for vital signs taken during this treatment. After treatment patient left in no apparent distress:   
Sitting in chair, Call bell within reach, Bed / chair alarm activated and RN notified. COMMUNICATION/EDUCATION:  
The patients plan of care was discussed with: Physical Therapist and Registered Nurse. Patient/family have participated as able in goal setting and plan of care. This patients plan of care is appropriate for delegation to [facility]. Thank you for this referral. 
Landon Long, OT Time Calculation: 28 mins

## 2019-10-02 NOTE — PROGRESS NOTES
Cardiology: Low BP noted. . We will attempt minimal coreg with close observation and withdraw if not workable. Nurses asked to use the higher of the brachial blood pressure readings. Often low dose Coreg turns out to be neutral to BP in heart failure patients. If he becomes hypotensive, we can transfer to PCU for a milrinone drip to tide him over. If there are engaged family members, we need to develop an understanding regarding perception of prognosis and goals of care. He is completely incapable of discharge on his own recognizance.   
 
Davon Paige MD

## 2019-10-02 NOTE — PROGRESS NOTES
Patient stated left arm was sore, pt currently receiving potassium IV, RN slowed rate from 100 ml/hr to 50ml/hr

## 2019-10-02 NOTE — PROGRESS NOTES
2200- Patient refused all medications. Medication education explained to patient but patient is disoriented and continued to refuse. [phone]- Patient went to restroom and refused to return to bed. Became slightly combative and attempted pulling IV out. Patient repeated multiple times \"Y'all think y'all got me\". MD notified.

## 2019-10-02 NOTE — PROGRESS NOTES
Cardiology: 
 
Maura Trevizo seems to be comfortable supine but he still complains of dyspnea. He seems unable to answer in full sentences and he is not entirely conversant. He continues with 2-3+ tibial edema with no sign of DVT. He has mild nonpulsatile JVD, but lungs are clear. Cardiac auscultation shows regular rhythm with occasional ectopy with absent S1, harsh systolic murmur both sides of the sternum, more prominent S2 mostly on the left side of the sternum. Abdomen persists minimally distended with a probable fluid wave sign. Monitor review shows increasingly frequent ventricular ectopy. Laboratory review shows significant elevation of TSH with T4 level pending. Potassium is 3.4. Magnesium is 2.0. Lactic acid has come down. Curiously, vitamin B12 is greater than 2000 with unusually high folate. This may be related to either 1 of the hepatic methyltransferase mutations or simply very poor hepatocellular function. As previously noted, echo shows extremely poor LV function, ejection fraction well under 20% with a spherical mass in the LV apex that seems firmly attached to the endocardium with a hollow or liquid center. Between this and the large mediastinal nodule with punctate calcification on the right side of the chest x-ray, he may have old \"burned out\" sarcoidosis. Curiously however there is no conduction system involvement. With still waiting for family input to determine pathway of care. He is completely unable to be self determining or to stay with medical care. He had no idea who I was today. With family engagement or detention care we can at least medically address his heart failure, but absent this type of support system, proper medical management will be nearly impossible. Impression: Dementia with very low level of function Significant hypothyroidism Congestive cardiomyopathy Possible long-standing granulomatous disease (note echo findings) Hypokalemia with increasing ventricular ectopy despite use of aldosterone antagonist 
 
Plan:  Increase Spironolactone to 50 mg daily KCl supplement 10 mEq by vein today Administer 1 g of magnesium after KCl Initiate Coreg at the lowest dose ACE level Initiate thyroid replacement therapy if indicated by T4 Nursing has been asked to keep the patient on telemetry at this time unless there is a documented change in treatment goals. Patient needs to be labeled as fall prone and medically unstable Dejan Chavira MD, [facility]

## 2019-10-02 NOTE — PROGRESS NOTES
Spoke with Dr. Rafi Watts regarding administration time of Coreg. MD stated to schedule Coreg  every 12 hours and to reschedule 6pm dose to be given 12 hours from last administration time. MD called directly on cell phone: [phone]

## 2019-10-02 NOTE — PROGRESS NOTES
CM contact patient decision maker Cheryle Bottoms [phone] sent up a conference meeting to discuss patient care. CM to arrange transportation via roundtrip to pick-up at [facility]. Adrianna brother Melvin Howard is also listed as decision maker CM ask if he is available to come Ms. Rodas  States \" my brother doesn't like responsibility, and he is inpatient wants stuff done quick, he is sweet as can be but cannot handle responsibility\" but states she will ask him to come. [address] [place] [address] 
[phone]

## 2019-10-02 NOTE — PROGRESS NOTES
Spiritual Care Assessment/Progress Note Ericka Butterfield 
 
 
NAME: Alex Aguirre      MRN: [id number] AGE: 78 y.o. SEX: male Jain Affiliation: Rufus Ruelas Language: English  
 
10/2/2019     Total Time (in minutes): 16 Spiritual Assessment begun in [address] through conversation with: 
  
    [x]Patient        [] Family    [] Friend(s) Reason for Consult: Palliative Care, Initial/Spiritual Assessment Spiritual beliefs: (Please include comment if needed) 
   [] Identifies with a nicole tradition:     
   [x] Supported by a nicole community:        
   [] Claims no spiritual orientation:       
   [] Seeking spiritual identity:            
   [] Adheres to an individual form of spirituality:       
   [] Not able to assess:                   
 
    
Identified resources for coping:  
   [] Prayer                           
   [] Music                  [] Guided Imagery [x] Family/friends                 [] Pet visits [] Devotional reading                         [] Unknown 
   [] Other:                                        
 
 
Interventions offered during this visit: (See comments for more details) Patient Interventions: Affirmation of nicole, Affirmation of emotions/emotional suffering, Iconic (affirming the presence of God/Higher Power), Coping skills reviewed/reinforced Plan of Care: 
 
 [] Support spiritual and/or cultural needs  
 [] Support AMD and/or advance care planning process    
 [] Support grieving process 
 [] Coordinate Rites and/or Rituals  
 [] Coordination with community clergy 
 [x] No spiritual needs identified at this time 
 [] Detailed Plan of Care below (See Comments)  [] Make referral to Music Therapy 
[] Make referral to Pet Therapy    
[] Make referral to Addiction services 
[] Make referral to [facility] 
[] Make referral to Spiritual Care Partner 
[] No future visits requested       
[x] Follow up visits as needed Comments: The patient was visited on the [facility] Tele unit for the purpose of making a spiritual assessment. The patient was alone during the visit except for two staff persons who were adjusting the patient's bed. The patient was unable to carry on a conversation that would expand upon his thoughts. The patient's conversation skills were limited. Patient indicated that he is spiritual and has been affiliated with a Yazidi in his past.. Advised of  availability.  will follow as able and/or needed. Rev. Paul Parker EdD  MDiv  For [facility] Page [phone])

## 2019-10-02 NOTE — PROGRESS NOTES
Patient refused his evening dose of Entresto. States he is afraid to take it because he doesn't know what is in it and that he has never taken it before. It was explained to patient what medication was for and why the doctor ordered it, and patient still refused. RN notified.

## 2019-10-02 NOTE — PROGRESS NOTES
Patient has two areas of concern on buttocks. First area is midline pin hole that is draining scant amount of white drainage and second area is on patient inner right buttock draining serosanginous fluid with a hardened area below skin. Attempted to call physician on cell phone and ext [phone] with no answer. Will attempt to reach physician again.

## 2019-10-02 NOTE — PROGRESS NOTES
Hospitalist Progress Note NAME: Bernardo Samaniego :  1950 MRN:  [id number] Room Number:  511/00  @ [facility] Summary: 78 y.o. male whom presented on 2019 with Assessment / Plan: 
New onset Congestive Heart Failure, EF~20% - Cardiology input appreciated - Strict Is and Os, 2 g Sodium cardiac diet, daily weights. 
- ECHO -echo shows extremely poor LV function, ejection fraction well under 20% with a spherical mass in the LV apex ,Possible long-standing granulomatous disease. Started on aldactone and Entresto by cards. Start coreg,c/w aldactone(inc dose),entresto Will get palliative consult to d/w goals of care/care decisions  
  
Dyspnea on exertion from above and left pleural effusion - IR guided thoracentesis of left pleural effusion, diagnostic and therapeutic if no improvement after diureses  
  
Hypokalemia Replace Undiagnosed dementia Collateral history and clinical progression highly suggestive of dementia.   
Start donepezil Hypothyroidism TSH elevated, Free T4 borderline low Start low dose levothyroxine Elevated lactic acid : -resolved with gentle hydration Elevated troponin : 
EKG shows sinus arrhythmia, T wave inversion in V5-V6. Troponin 0.07. Likely d/t congestive heart failure. Microcytic hypochromic anemia :likely iron def Start iron tabs 
  
Code Status:  FULL Surrogate Decision Maker: sisterAlejandro  [phone] Mil [place] [phone] 
  
DVT Prophylaxis: Hep SQ and SCD's 
GI Prophylaxis: not indicated 
  
Baseline:  unknown Subjective: Chief Complaint / Reason for Physician Visit Still c/o SOB,edema in legs. Does not recall name of brother or sister this am, doesn't remember name of hospital or why he is here Review of Systems: No fevers, chills, appetite change, cough, sputum production, shortness of breath, dyspnea on exertion, nausea, vomitting, diarrhea, constipation, chest pain, leg edema, abdominal pain, joint pain, rash, itching. Tolerating diet. Objective: VITALS:  
Last 24hrs VS reviewed since prior progress note. Most recent are: 
Patient Vitals for the past 24 hrs: 
 Temp Pulse Resp BP SpO2  
10/02/19 1110 97.1 °F (36.2 °C) 88 16 101/76 100 % 10/02/19 0736 97.6 °F (36.4 °C) 75 16 108/71 100 % 10/02/19 0430 97.4 °F (36.3 °C) 78 17 109/79 100 % 10/01/19 2006     100 % 10/01/19 1944 96.1 °F (35.6 °C) 97 18 124/87 99 % 10/01/19 1615 96.4 °F (35.8 °C) 98 17 (!) 123/102 100 % 10/01/19 1610  95  (!) 127/94  Intake/Output Summary (Last 24 hours) at 10/2/2019 1411 Last data filed at 10/1/2019 1800 Gross per 24 hour Intake 240 ml Output  Net 240 ml PHYSICAL EXAM: 
General: WD, WN. Alert, cooperative, no acute distress EENT:  EOMI. Anicteric sclerae. MMM Resp:  Decreased breath sounds at right lower lung field and left lower and middle lung fields, mild crackles in right base, no accessory muscle use. CV:  Regular rate rhythm,  normal S1/S2, no murmurs rubs gallops, 3+ bilaterally symmetrical lower extremity edema GI:  Soft, Non distended, Non tender. +Bowel sounds Neurologic:  Alert and oriented X 1, normal speech, no focal neurological deficit with the exception of blindness in left eye Psych:   Not anxious nor agitated Skin:  No rashes. No jaundice Reviewed most current lab test results and cultures  YES Reviewed most current radiology test results   YES Review and summation of old records today    NO Reviewed patient's current orders and MAR    YES 
PMH/SH reviewed - no change compared to H&P 
________________________________________________________________________ Care Plan discussed with: 
  Comments Patient  x Family   x  
RN  x Care Manager  x Consultant   x                   Multidiciplinary team rounds were held today with case manager, nursing, pharmacist and clinical coordinator. Patient's plan of care was discussed; medications were reviewed and discharge planning was addressed. ________________________________________________________________________ Total NON critical care TIME:  20  Minutes Total CRITICAL CARE TIME Spent:   Minutes non procedure based Comments >50% of visit spent in counseling and coordination of care    
________________________________________________________________________ Deana Wyatt MD  
 
Procedures: see electronic medical records for all procedures/Xrays and details which were not copied into this note but were reviewed prior to creation of Plan. LABS: 
I reviewed today's most current labs and imaging studies. Pertinent labs include: 
Recent Labs  
  09/30/19 
1619 WBC 5.1 HGB 11.7* HCT 39.0  Recent Labs 10/02/19 
0425 09/30/19 
1619  141  
K 3.4* 4.4  
 106 CO2 24 22 * 80 BUN 24* 31* CREA 1.06 1.24  
CA 8.5 9.6 MG 2.0 2.2 PHOS 2.9  --   
ALB  --  3.1* TBILI  --  1.1*  
SGOT  --  56* ALT  --  96* INR  --  1.4* Signed: Deana Wyatt MD

## 2019-10-03 NOTE — PROGRESS NOTES
Palliative Medicine [facility]: [phone] [facility]: [phone] Consult for palliative team for Care Decisions received. Discussed case with Dr Claudean Meuse. Family for patient in room, but were going to leave by noon. Palliative team unable to get there to meet family in denoted time. Patient has dementia and family presence needed for goals of care discussion. Family dynamics complicated by financial and transportation issues. Palliative team will arrange an alternate time to meet with patient and family.

## 2019-10-03 NOTE — PROGRESS NOTES
Lunch tray delivered and patient refused to eat stating he wasn't hungry. Encouraged patient to eat again after a nap and again pt stated he isn't hungry.

## 2019-10-03 NOTE — PROGRESS NOTES
FAMILY MEETING 10/3/2019 Had family meeting today with sister -Prabha Ashby and brother- Jazmin Garcia. Patient is single/unmarried and does not have kids.  Ms. Rodas and . Heidi Callaway are his next of kin. Discussed in detail the diagnosis of new onset congestive heart failure and advanced dementia and other medical comorbidities.   
brother reported he has been having medical issues since the past few months however has been declining to seek medical attention or go to a physician. I did discuss the patient will require complex medical care and will have to follow-up with his appointments. However ,both brother and sister are unable to accept patient at this time. Discussed about CODE STATUS with family. Marcell Mac requested DNR status.  Ms. Rodas/sister,(NOK) mentioned Brother(Niall) would not want aggressive care including CPR or intubation. Will discuss with risk management/legal to see what the next steps are before changing CODE STATUS from full code to DNR. Discussed with ,Ms Turcios. Disposition is a challenge currently. Medicaid is pending.  Appreciate her assistance. Marcell Mac will follow-up on it.

## 2019-10-03 NOTE — PROGRESS NOTES
Problem: Pressure Injury - Risk of 
Goal: *Prevention of pressure injury Description Document Yosvany Scale and appropriate interventions in the flowsheet. Outcome: Progressing Towards Goal 
Note:  
Pressure Injury Interventions: 
Sensory Interventions: Check visual cues for pain Moisture Interventions: Check for incontinence Q2 hours and as needed, Contain wound drainage, Maintain skin hydration (lotion/cream), Minimize layers Activity Interventions: Increase time out of bed, PT/OT evaluation Mobility Interventions: Turn and reposition approx. every two hours(pillow and wedges), PT/OT evaluation Nutrition Interventions: Document food/fluid/supplement intake Friction and Shear Interventions: Minimize layers Problem: Patient Education: Go to Patient Education Activity Goal: Patient/Family Education Outcome: Progressing Towards Goal 
  
Problem: Falls - Risk of 
Goal: *Absence of Falls Description Document Tressia Bullion Fall Risk and appropriate interventions in the flowsheet. Outcome: Progressing Towards Goal 
Note:  
Fall Risk Interventions: 
Mobility Interventions: Bed/chair exit alarm, Communicate number of staff needed for ambulation/transfer, Patient to call before getting OOB, Utilize walker, cane, or other assistive device Mentation Interventions: Adequate sleep, hydration, pain control, Bed/chair exit alarm, Door open when patient unattended, Increase mobility, More frequent rounding, Room close to nurse's station, Toileting rounds Medication Interventions: Bed/chair exit alarm, Evaluate medications/consider consulting pharmacy, Teach patient to arise slowly Elimination Interventions: Bed/chair exit alarm, Call light in reach, Urinal in reach, Toileting schedule/hourly rounds, Patient to call for help with toileting needs Problem: Patient Education: Go to Patient Education Activity Goal: Patient/Family Education Outcome: Progressing Towards Goal 
  
 Problem: Cardiac Output -  Decreased Goal: *Vital signs within specified parameters Outcome: Progressing Towards Goal 
Goal: *Absence of hypoxia Outcome: Progressing Towards Goal 
Goal: *Intravascular fluid volume and electrolyte balance Outcome: Progressing Towards Goal 
  
Problem: Cardiac Output -  Decreased Goal: *Optimal cardiac output Outcome: Not Progressing Towards Goal 
Goal: *Absence of peripheral edema Outcome: Not Progressing Towards Goal

## 2019-10-03 NOTE — PROGRESS NOTES
**Consult Information** 
Member Facility: [address] [place] [address] MRN: [id number] Consult ID: [id number] Facility Time Zone: ET 
Date and Time of Consult: 10/02/2019 09:51:28 PM 
Requesting Clinician: . Patient Name: Radha Johnson Date of Birth: [birth year] Gender: Male **Clinical Note** Clinical Note: 78 yr old with CHF noted to have NSVT 
 
 - check electrolytes. - Echo results pending. **Attestation** Interaction Mode: Electronic Interaction Interaction Attestation: Interprofessional internet consultation was delivered through telephonic and/ or electronic communication upon the request of the patients treating physician, while the requesting and the rendering provider were not in the same physical location. Written report was provided to the requesting provider. Evaluation Duration (mins): 2

## 2019-10-03 NOTE — PROGRESS NOTES
Hospitalist Progress Note NAME: Gil Bailey :  1950 MRN:  [id number] Room Number:  861/23  @ [facility] Summary: 78 y.o. male whom presented on 2019 with Assessment / Plan: 
Update- Had family meeting today with sister -Joya Jones and brother- Mariela Berg. Patient is single/unmarried and does not have kids.  Ms. Rodas and Mr. Tanmay Cervantes are his next of kin. Discussed in detail the diagnosis of new onset congestive heart failure and advanced dementia and other medical comorbidities.   
brother reported he has been having medical issues since the past few months however has been declining to seek medical attention or go to a physician. I did discuss the patient will require complex medical care and will have to follow-up with his appointments. However ,both brother and sister are unable to accept patient at this time. Discussed about CODE STATUS with family. Riaan Nguyen requested DNR status.  Ms. Rodas/sister,(NOK) mentioned Brother(Niall) would not want aggressive care including CPR or intubation. Will discuss with risk management to see what the next steps are before changing CODE STATUS from full code to DNR. Discussed with ,Ms Turcios. Disposition is a challenge currently. Medicaid is pending.  Appreciate her assistance. Riana Nguyen will follow-up on it. New onset Systolic Congestive Heart Failure, EF~20% - Cardiology input appreciated - Strict Is and Os, 2 g Sodium cardiac diet, daily weights. 
- ECHO -echo shows extremely poor LV function, ejection fraction well under 20% with a spherical mass in the LV apex ,Possible long-standing granulomatous disease. Started on aldactone and Entresto by cards. Start coreg,c/w aldactone(inc dose),entresto AWAIT palliative consult to d/w goals of care/care decisions  
  
Dyspnea on exertion from above and left pleural effusion - IR guided thoracentesis of left pleural effusion, diagnostic and therapeutic if no improvement after diureses  
  
Hypokalemia Replace Undiagnosed dementia Collateral history and clinical progression highly suggestive of dementia.   
Start donepezil Hypothyroidism TSH elevated, Free T4 borderline low Start low dose levothyroxine Elevated lactic acid : -resolved with gentle hydration Elevated troponin : 
EKG shows sinus arrhythmia, T wave inversion in V5-V6. Troponin 0.07. Likely d/t congestive heart failure. Microcytic hypochromic anemia :likely iron def Start iron tabs 
  
Code Status:  FULL Surrogate Decision Maker: Emery razo  [phone] Brian Bob [phone] 
  
DVT Prophylaxis: Hep SQ and SCD's 
GI Prophylaxis: not indicated 
  
Baseline:  unknown Subjective: Chief Complaint / Reason for Physician Visit Refusing meds this am. Does not recollect who the heart doc was. Family meeting today with sister(Adrianna) and brother(Elieser) Review of Systems: No fevers, chills, appetite change, cough, sputum production, shortness of breath, dyspnea on exertion, nausea, vomitting, diarrhea, constipation, chest pain, leg edema, abdominal pain, joint pain, rash, itching. Tolerating diet. Objective: VITALS:  
Last 24hrs VS reviewed since prior progress note. Most recent are: 
Patient Vitals for the past 24 hrs: 
 Temp Pulse Resp BP SpO2  
10/03/19 1200  75     
10/03/19 0800  78     
10/03/19 0740 97.1 °F (36.2 °C) 78 16 100/65 100 % 10/03/19 0445 98.1 °F (36.7 °C) 72 14 99/69 96 % 10/02/19 2345 97.1 °F (36.2 °C) 83 13 102/71 100 % 10/02/19 2020 97.3 °F (36.3 °C) 83 14 98/68 94 % 10/02/19 1618  83 16 109/71 98 % 10/02/19 1515  92  110/88 98 % 10/02/19 1453  85  (!) 85/53   
10/02/19 1451  85  97/66  No intake or output data in the 24 hours ending 10/03/19 1357 PHYSICAL EXAM: 
 General: WD, WN. Alert, cooperative, no acute distress EENT:  EOMI. Anicteric sclerae. MMM Resp:  Decreased breath sounds at right lower lung field and left lower and middle lung fields, mild crackles in right base, no accessory muscle use. CV:  Regular rate rhythm,  normal S1/S2, no murmurs rubs gallops, 3+ bilaterally symmetrical lower extremity edema GI:  Soft, Non distended, Non tender. +Bowel sounds Neurologic:  Alert and oriented X 1, normal speech, no focal neurological deficit with the exception of blindness in left eye Psych:   Not anxious nor agitated Skin:  No rashes. No jaundice Reviewed most current lab test results and cultures  YES Reviewed most current radiology test results   YES Review and summation of old records today    NO Reviewed patient's current orders and MAR    YES 
PMH/ reviewed - no change compared to H&P 
________________________________________________________________________ Care Plan discussed with: 
  Comments Patient  x Family   x  
RN  x Care Manager  x Consultant   x Multidiciplinary team rounds were held today with , nursing, pharmacist and clinical coordinator. Patient's plan of care was discussed; medications were reviewed and discharge planning was addressed. ________________________________________________________________________ Total NON critical care TIME:  20  Minutes Total CRITICAL CARE TIME Spent:   Minutes non procedure based Comments >50% of visit spent in counseling and coordination of care    
________________________________________________________________________ Michoacano Huerta MD  
 
Procedures: see electronic medical records for all procedures/Xrays and details which were not copied into this note but were reviewed prior to creation of Plan. LABS: 
I reviewed today's most current labs and imaging studies. Pertinent labs include: 
Recent Labs  
  09/30/19 
1619 WBC 5.1 HGB 11.7* HCT 39.0  Recent Labs 10/03/19 
0505 10/03/19 
0011 10/02/19 
0425 09/30/19 
1619  137 140 141  
K 3.4* 3.6 3.4* 4.4  
 106 106 106 CO2 23 22 24 22 * 121* 129* 80 BUN 17 19 24* 31* CREA 0.79 0.83 1.06 1.24  
CA 8.2* 8.6 8.5 9.6 MG  --  2.2 2.0 2.2 PHOS  --   --  2.9  --   
ALB  --   --   --  3.1* TBILI  --   --   --  1.1*  
SGOT  --   --   --  56* ALT  --   --   --  96* INR  --   --   --  1.4* Signed: Dayana Chauhan MD

## 2019-10-03 NOTE — PROGRESS NOTES
Reason for Admission:    
 78 y.o. male with unknown past medical history brought into the emergency department as an ECO for altered mental status. Per report, patient has had a steady decline in his mental status over the last few months and has become increasingly weak as well as having significant dyspnea on exertion over the last week See H&P for detail RRAT Score:      
9 Do you (patient/family) have any concerns for transition/discharge? Patient unable to care for self- family unable to provide care. Needs placement Plan for utilizing home health:    
Assessment in progress to determine level of care Current Advanced Directive/Advance Care Plan:   
        Fill code at this time. Consult for Palliative Care in progress Transition of Care Plan:       
 
Discussed in rounds with MD and team this am.  
Meeting today with CM, sister,Adrianna  and brother Abdoulaye Case . Sheridan Soni Was very difficult to get clarifying information from family at this time. According to the sister patient has been steady declining for the past several months. More so in the past 3 months. Where he was not able to take care of himself. She had been coming in lately trying to help out as much as she was able. He has not been to a providers in a long time. Spring Miranda he was not coming back. Nor was family able to encourage him to go. He had become more confused and basically confined to his apartment. See previous note from CM. The sister indicates he had surgery years ago on his buttock and the doctor told him he must do good hygiene each day. The past few months has been done so. She indicates she got concerned when she saw the two spots on his buttock- blacks stuff coming out. Wound Care Consult in progress. She feels he may  not get better The Brother Abdoulaye Case lives in the same apartment complex. This brother is not able to provide care either. According to family he has never  ,nor any children. Was told the immediate family is the sister and brother. ? If the other sister is no longer living. Indicates not close knit family. She did said her sister's son is supportive and will be the one to assist family in times of crisis. Will check with this nephew to see if he can shed some light on the family situation or be able to help direct the sister. When she gave permission to call the Nephew she said he will not be able to provide any information. This Nephew may be a help with getting the sister and brother to understand what is happening and what needs to be done. No one has Financial POA or access to his Bank Account unless use of bank card. The brother Heidi Callaway indicates he would take him to the bank to get funds to pay his rent each month. The rent has not been paid for this month. The sister indicates she got two Money Orders from [employer]- amounting to $600.00 to pay the rent. Gave them to the patient to signed. She did not receive them back. She thinks he put them in the bag where she had beside his bed for him to put his dirty depends in. She took the bags and threw them away before she was aware of this possibility. She indicates she did not save the receipts when she brought them. Asked about going back to [employer] to see if she can have them traced/stop and reissue. She was too overwhelmed to think about this follow-up She indicates since he is unable to return to the apartment she was going to just let it go. She plans to turn in the keys to the  this week. Asked her to hold off until we can talk to the [facility] to see if they would be willing to at least get his mail and send it to the hospital. 
 
Will check to see if he has Medicaid at least partial benefits.  The sister said he was getting Food Stamp ( her name was on this since he was unable to navigate and purchase his food). She indicates he was getting 180.00 then up to 194.00 for this month. Asked about his insurance cards and she indicates his wallet was given to the EMS when he was coming to the hospital. To check with nursing to see what information was verified when he was admitted. Then she later said his wallet had a lot of papers in it but when he was being pick-up she notice all of the papers were no longer their. She thinks the EMS team took them out. She indicates she has cleaned his apartment out. She has thrown all of his papers out. All his mail . I had asked about information needed for applying for benefits. She indicates he get about 870.00 for social security. She indicates he worked in  for a long time (  I think at [employer]) Asked about [employer] she said she saw some but they have all been thrown away. Asked about life insurance policy and she indicates she was unable to find any. Some difficulty getting information from sister at times - focus -though process. Will make referral to the Complex Case . for family search. Patient may need legal representation regarding access to his finances. Will ask MD for Psych Evaluation/ Capacity. Plan: 
-Palliative Care Consult for goals of care and family understanding of next steps regarding his condition 
 
-Complex CM referral  
 
-Psych Evaluation for Capacity if needed. Addendum: I did call the Medicaid line to verify benefits and nothing came up under this SS #. The Chart said Medicaid Pending but do not see note from [address] [place] [address] Drive an e-mail for them to clarify.    
 
[address] MSW RN

## 2019-10-03 NOTE — CDMP QUERY
Pt admitted with SOB, BLE edema and has New onset CHF documented. Could you please further specify type and acuity of CHF in the medical record. ? Acute Systolic CHF ? Acute Systolic and Diastolic CHF ? Acute Right Sided Heart failure 
? Other, please specify ? Clinically unable to determine The medical record reflects the following: 
   
Risk Factors: Chronic ETOH use Clinical Indicators:  
10/2 Progress note:  
New onset Congestive Heart Failure, EF~20% - Cardiology input appreciated - Strict Is and Os, 2 g Sodium cardiac diet, daily weights. 
- ECHO -echo shows extremely poor LV function, ejection fraction well under 20% with a spherical mass in the LV apex ,Possible long-standing granulomatous disease. 10/3 Cardiology progress note: He has end stage congestive cardiomyopathy Treatment: Start coreg,c/w aldactone(inc dose),entresto Thank you, 
Jeri Pickard, MSN, [address] [facility], [address] [facility]

## 2019-10-03 NOTE — PROGRESS NOTES
Cardiology: 
 
Blood pressure and pulse have come up and he is tolerating the initial dose of coreg well. Potassium is still low despite spironolactone and will need at least transient supplementation. T4 is WNL but he still could benefit from cautious replacement. Will defer to hospitalist for this aspect. It does not appear the Cardiology service was contacted about NSVT last evening. The echo result is mentioned in detail in the progress notes. He has end stage congestive cardiomyopathy. He needs his potassium brought up to high normal, and since he can lie flat and has stable low grade edema, loop diuretics should be avoided at present in favor of aldosterone antagonist.  
 
Regardless of prognosis/family based decisions, electrolytes need to be normalized and a decision made about possible thyroid treatment before discharge or other disposition. Humanely, he will be more comfortable with entresto than without. ACE level is still pending. Will check urine K as the least costly way to decide about significant aldosteronism or pseudoaldosteronism.   
 
Willie Dennison MD [facility]

## 2019-10-03 NOTE — PROGRESS NOTES
Telemedicine service contacted to report abnormal cardiac rhythm. At 2125, patient was resting quietly in bed and telemetry tech noted that patient had 15 beat run of [address]. Patient denied any chest pain, SOB, dizziness or heart palpitations. 2 LPM oxygen placed on patient during sleep r/t apneic periods of breathing during which oxygen decreased to 88%. Patient was noted to have 3 beat runs of VTACH during day shift per report from day shift nurse. Will continue to monitor patient.

## 2019-10-03 NOTE — PROGRESS NOTES
Bedside and Verbal shift change report given to Georgetta Pallas, RN (oncoming nurse) by Solange North RN (offgoing nurse). Report included the following information SBAR and Kardex.

## 2019-10-03 NOTE — PROGRESS NOTES
8905  receive call from patient sister Adonay Castrejon said she thinks she has find his bank card. Ms Adonay Castrejon read rhe number ou loud and ask \" is that his bank card\" CM inform no able to see itt, it seems like it's a card. [address] [place] [address] 
[phone]

## 2019-10-03 NOTE — PROGRESS NOTES
Advance Care Planning (ACP) Provider Note - Comprehensive Date of ACP Conversation: 10/03/19 Persons included in Conversation:  patient and family Length of ACP Conversation in minutes:  16 minutes Authorized Decision Maker (if patient is incapable of making informed decisions): This person is: Other Legally Authorized Decision Maker (e.g. Next of Kin) General ACP for ALL Patients with Decision Making Capacity: 
 Importance of advance care planning, including choosing a healthcare agent to communicate patient's healthcare decisions if patient lost the ability to make decisions, such as after a sudden illness or accident Understanding of the healthcare agent role was assessed and information provided Exploration of values, goals, and preferences if recovery is not expected, even with continued medical treatment in the event of: Imminent death Severe, permanent brain injury Opportunity offered to explore how cultural, Religion, spiritual, or personal beliefs would affect decisions for future care For Serious or Chronic Illness: 
Understanding of medical condition Understanding of CPR, goals and expected outcomes, benefits and burdens discussed. Explored fears and concerns regarding CPR or possible outcomes Interventions Provided: 
Recommended completion of Advance Directive form after review of ACP materials and conversation with prospective healthcare agent Referral made for ACP follow-up assistance to:  Certified ACP Facilitator

## 2019-10-04 NOTE — PROGRESS NOTES
Problem: Pressure Injury - Risk of 
Goal: *Prevention of pressure injury Description Document Yosvany Scale and appropriate interventions in the flowsheet. Outcome: Progressing Towards Goal 
Note:  
Pressure Injury Interventions: 
Sensory Interventions: Check visual cues for pain, Keep linens dry and wrinkle-free, Minimize linen layers, Assess need for specialty bed Moisture Interventions: Check for incontinence Q2 hours and as needed, Maintain skin hydration (lotion/cream), Contain wound drainage, Absorbent underpads Activity Interventions: Chair cushion, Increase time out of bed, Assess need for specialty bed Mobility Interventions: HOB 30 degrees or less, Assess need for specialty bed, Float heels, Turn and reposition approx. every two hours(pillow and wedges), PT/OT evaluation Nutrition Interventions: Document food/fluid/supplement intake Friction and Shear Interventions: Foam dressings/transparent film/skin sealants, Apply protective barrier, creams and emollients, Minimize layers Problem: Patient Education: Go to Patient Education Activity Goal: Patient/Family Education Outcome: Progressing Towards Goal 
  
Problem: Falls - Risk of 
Goal: *Absence of Falls Description Document Rafi Tolliver Fall Risk and appropriate interventions in the flowsheet. Outcome: Progressing Towards Goal 
Note:  
Fall Risk Interventions: 
Mobility Interventions: Bed/chair exit alarm, Communicate number of staff needed for ambulation/transfer, Patient to call before getting OOB, Utilize walker, cane, or other assistive device Mentation Interventions: Adequate sleep, hydration, pain control, Bed/chair exit alarm, Door open when patient unattended, Increase mobility, More frequent rounding, Toileting rounds Medication Interventions: Assess postural VS orthostatic hypotension, Bed/chair exit alarm, Patient to call before getting OOB, Teach patient to arise slowly Elimination Interventions: Bed/chair exit alarm, Call light in reach, Urinal in reach, Toileting schedule/hourly rounds, Patient to call for help with toileting needs Problem: Patient Education: Go to Patient Education Activity Goal: Patient/Family Education Outcome: Progressing Towards Goal 
  
Problem: Patient Education: Go to Patient Education Activity Goal: Patient/Family Education Outcome: Progressing Towards Goal 
  
Problem: Patient Education: Go to Patient Education Activity Goal: Patient/Family Education Outcome: Progressing Towards Goal 
  
Problem: Cardiac Output -  Decreased Goal: *Vital signs within specified parameters Outcome: Progressing Towards Goal 
Goal: *Optimal cardiac output Outcome: Progressing Towards Goal 
Goal: *Absence of hypoxia Outcome: Progressing Towards Goal 
Goal: *Absence of peripheral edema Outcome: Progressing Towards Goal 
Goal: *Intravascular fluid volume and electrolyte balance Outcome: Progressing Towards Goal
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
## 2019-10-04 NOTE — PROGRESS NOTES
Cardiology: Interim events regarding social factors and capacity for decision making are reviewed. We are bound to conservative management at this time. BP is low enough so that we will not advance any of the heart failure medications now. He does need his electrolytes fully corrected.   
 
Elicia Bear MD

## 2019-10-04 NOTE — PROGRESS NOTES
Bedside and Verbal shift change report given to Moriah Piña RN and Jennifer Hooks LPN (oncoming nurse) by Nitin Hannon RN (offgoing nurse). Report included the following information SBAR and Kardex.

## 2019-10-04 NOTE — PROGRESS NOTES
Hospitalist Progress Note NAME: Elzbieta Self :  1950 MRN:  [id number] Room Number:  936/24  @ [facility] Summary: 78 y.o. male whom presented on 2019 with Assessment / Plan: 
Update- 
Please review earlier detailed progress notes for details on family meeting 10/3 Consult psychiatry for help in [address]. New onset Systolic Congestive Heart Failure, EF~20% - Cardiology input appreciated - Strict Is and Os, 2 g Sodium cardiac diet, daily weights. 
- ECHO -echo shows extremely poor LV function, ejection fraction well under 20% with a spherical mass in the LV apex ,Possible long-standing granulomatous disease. Started on aldactone and Entresto by cards. Start coreg,c/w aldactone(inc dose),entresto AWAIT palliative consult to d/w goals of care/care decisions  
  
Dyspnea on exertion from above and left pleural effusion - IR guided thoracentesis of left pleural effusion, diagnostic and therapeutic if no improvement after diureses  
  
Hypokalemia Replace Undiagnosed dementia Collateral history and clinical progression highly suggestive of dementia.   
Start donepezil Hypothyroidism TSH elevated, Free T4 borderline low Start low dose levothyroxine Elevated lactic acid : -resolved with gentle hydration Elevated troponin : 
EKG shows sinus arrhythmia, T wave inversion in V5-V6. Troponin 0.07. Likely d/t congestive heart failure. Microcytic hypochromic anemia :likely iron def Start iron tabs 
  
Code Status:  FULL Surrogate Decision Maker: Amos razo  [phone] Ben Portillo [phone] 
  
DVT Prophylaxis: Hep SQ and SCD's 
GI Prophylaxis: not indicated 
  
Baseline:  unknown Subjective: Chief Complaint / Reason for Physician Visit When will I go home? My legs are still swollen Review of Systems: No fevers, chills, appetite change, cough, sputum production, shortness of breath, dyspnea on exertion, nausea, vomitting, diarrhea, constipation, chest pain, leg edema, abdominal pain, joint pain, rash, itching. Tolerating diet. Objective: VITALS:  
Last 24hrs VS reviewed since prior progress note. Most recent are: 
Patient Vitals for the past 24 hrs: 
 Temp Pulse Resp BP SpO2  
10/04/19 0819 97.4 °F (36.3 °C) 72 16 96/65 100 % 10/04/19 0800  78     
10/04/19 0415 97.8 °F (36.6 °C) 73 20 92/65 99 % 10/04/19 0020 97.3 °F (36.3 °C) 80 20 102/72 100 % 10/03/19 2140  78  97/70   
10/03/19 2015 97.3 °F (36.3 °C) 77 22 96/63 98 % 10/03/19 1200  [address] [facility] [address] No intake or output data in the 24 hours ending 10/04/19 1124 PHYSICAL EXAM: 
General: WD, WN. Alert, cooperative, no acute distress EENT:  EOMI. Anicteric sclerae. MMM Resp:  Decreased breath sounds at right lower lung field and left lower and middle lung fields, mild crackles in right base, no accessory muscle use. CV:  Regular rate rhythm,  normal S1/S2, no murmurs rubs gallops, 3+ bilaterally symmetrical lower extremity edema GI:  Soft, Non distended, Non tender. +Bowel sounds Neurologic:  Alert and oriented X 1, normal speech, no focal neurological deficit with the exception of blindness in left eye Psych:   Not anxious nor agitated Skin:  No rashes. No jaundice Reviewed most current lab test results and cultures  YES Reviewed most current radiology test results   YES Review and summation of old records today    NO Reviewed patient's current orders and MAR    YES 
PMH/SH reviewed - no change compared to H&P 
________________________________________________________________________ Care Plan discussed with: 
  Comments Patient  x Family   x  
RN  x Care Manager  x Consultant   x                   Multidiciplinary team rounds were held today with case manager, nursing, pharmacist and clinical coordinator. Patient's plan of care was discussed; medications were reviewed and discharge planning was addressed. ________________________________________________________________________ Total NON critical care TIME:  20  Minutes Total CRITICAL CARE TIME Spent:   Minutes non procedure based Comments >50% of visit spent in counseling and coordination of care    
________________________________________________________________________ Cheikh Benjamin MD  
 
Procedures: see electronic medical records for all procedures/Xrays and details which were not copied into this note but were reviewed prior to creation of Plan. LABS: 
I reviewed today's most current labs and imaging studies. Pertinent labs include: No results for input(s): WBC, HGB, HCT, PLT, HGBEXT, HCTEXT, PLTEXT, HGBEXT, HCTEXT, PLTEXT in the last 72 hours. Recent Labs 10/03/19 
0505 10/03/19 
0011 10/02/19 
0425  137 140  
K 3.4* 3.6 3.4*  
 106 106 CO2 23 22 24 * 121* 129* BUN 17 19 24* CREA 0.79 0.83 1.06  
CA 8.2* 8.6 8.5 MG  --  2.2 2.0 PHOS  --   --  2.9 Signed: Cheikh Benjamin MD

## 2019-10-04 NOTE — CONSULTS
PSYCHIATRY CONSULT NOTE: 
 
REASON FOR CONSULT: 
Dementia HISTORY OF PRESENTING COMPLAINT: 
Alena Ballard is a 78 y.o. male admitted to medical for treatment of Congestive Heart Failure. He complains of being tired and gets short of breath easy. Suspicion of dementia from collaterals, however patient presents with a waxing and waning level of consciousness, seen by different providers. There is a chance of dementia being a diagnosis, however his delirium must first be cleared to establish baseline and whether this difference of cognition and alertness is permanent. PAST PSYCHIATRIC HISTORY: 
None SUBSTANCE ABUSE HISTORY: 
Social History Substance and Sexual Activity Drug Use Never Social History Substance and Sexual Activity Alcohol Use Not Currently Comment: quit 3 years ago Social History Tobacco Use Smoking Status Former Smoker Tobacco Comment  
 quit 4 years ago PAST MEDICAL HISTORY: 
History reviewed. No pertinent past medical history. SOCIAL HISTORY: 
See H&P for details VITALS: 
Visit Vitals BP 96/65 (BP 1 Location: Left arm, BP Patient Position: Sitting) Pulse 72 Temp 97.4 °F (36.3 °C) Resp 16 Ht 5' 5\" (1.651 m) Wt 63.9 kg (140 lb 14 oz) SpO2 100% BMI 23.44 kg/m² MEDICATIONS: 
 
Current Facility-Administered Medications:  
  donepezil (ARICEPT) tablet 5 mg, 5 mg, Oral, QHS, Marshall Vaca MD, 5 mg at 10/03/19 2141   loperamide (IMODIUM) capsule 2 mg, 2 mg, Oral, Q4H PRN, Marshall Vaca MD, 2 mg at 10/03/19 1203   spironolactone (ALDACTONE) tablet 50 mg, 50 mg, Oral, DAILY, Mahogany Silva MD, 50 mg at 10/04/19 8807   ferrous sulfate tablet 325 mg, 1 Tab, Oral, BID WITH MEALS, Marshall Vaca MD, 325 mg at 10/04/19 1130   levothyroxine (SYNTHROID) tablet 25 mcg, 25 mcg, Oral, 6am, Marshall Vaca MD, 25 mcg at 10/04/19 9467   carvedilol (COREG) tablet 3.125 mg, 3.125 mg, Oral, BID, Lorrane Corti MD RICO, 3.125 mg at 10/04/19 4080   sacubitril-valsartan (ENTRESTO) 24-26 mg tablet 1 Tab, 1 Tab, Oral, Q12H, Jules Leonardo MD, 1 Tab at 10/04/19 3828   sodium chloride (NS) flush 5-40 mL, 5-40 mL, IntraVENous, Q8H, Soni Yanez MD, 10 mL at 10/04/19 1342   sodium chloride (NS) flush 5-40 mL, 5-40 mL, IntraVENous, PRN, Soni Yanez MD 
  heparin (porcine) injection 5,000 Units, 5,000 Units, SubCUTAneous, Q8H, Soni Yanez MD, 5,000 Units at 10/04/19 1340   acetaminophen (TYLENOL) tablet 650 mg, 650 mg, Oral, Q4H PRN, Soni Yanez MD 
  ondansetron (ZOFRAN ODT) tablet 4 mg, 4 mg, Oral, Q4H PRN, Soni Yanez MD 
 
MENTAL STATUS EXAM: 
Other; Resting, opens eyes and responds briefly before falling back to sleep Disoriented Drowsy Goal directed and limited ASSESSMENT AND PLAN: 
Delirium due to multifactorial causes, Deferred , Problems with primary support group and Problems related to social environment  and 51-60 moderate symptoms RECOMMENDATIONS: 
Treat CHF and monitor delirium Reassess level of consciousness daily Donepezil may help with some cognitive impariment Neurology consult may be in order if delirium clears and confusion remains constant. Not a candidate for inpt psychiatric at this time Thank you for this consultation Jose A Camargo MD 
10/4/2019

## 2019-10-04 NOTE — CONSULTS
NEUROLOGY CONSULTATION 
 
DATE OF CONSULTATION: 10/3/2019 CONSULTED BY:Dr Collado File REASON FOR CONSULT:AMS HISTORY OF PRESENT ILLNESS Kirsten Tony is a 78 y.o. left-handed black male with history of hypertension, remote history of EtOH abuse, who presented to the emergency room with altered mental status. According to the history obtained from the chart, patient apparently lives alone, his sister try to the touch with him but unable to do so, getting to the patient's house he was unable to answer the door, at this time patient's sister called police broke into the house and found patient sitting in the chair. Patient was said to be unable to get up to get to the door because he was very weak. Patient's sister noted that recently patient had been somewhat confused mixing of the days and time when she spoke to him over the phone. Patient's sister stated that patient lost vision of one eye suddenly sometime ago and did not go to the doctor. Patient was said to have been followed by [facility] care but apparently not keeping his appointment. Patient was associated with drinking heavily. Initial CT scan of the brain obtained in the emergency room was significant for encephalomalacia consistent with old infarct, apparently that was not affected patient's eye. Patient was subsequently admitted for further evaluation and management. At the time of interview with patient, he was still confused, unable to recall the day, time, and place where he was. He did not remember how he got to the hospital. 
Review of Systems - General ROS: positive for  - fatigue, night sweats and sleep disturbance Psychological ROS: positive for - anxiety, depression and sleep disturbances Ophthalmic ROS: positive for - blurry vision, decreased vision and double vision ENT ROS: positive for - tinnitus, vertigo and visual changes Allergy and Immunology ROS: negative Hematological and Lymphatic ROS: negative Endocrine ROS: negative Respiratory ROS: no cough, shortness of breath, or wheezing Cardiovascular ROS: no chest pain or dyspnea on exertion Gastrointestinal ROS: no abdominal pain, change in bowel habits, or black or bloody stools Genito-Urinary ROS: positive for - urinary frequency/urgency Musculoskeletal ROS: positive for - gait disturbance, joint pain, joint stiffness, joint swelling, muscle pain and muscular weakness Neurological ROS: positive for - gait disturbance, impaired coordination/balance, numbness/tingling, tremors, visual changes and weakness Dermatological ROS: negative PMH Hypertension [address] Social History Socioeconomic History  Marital status: SINGLE Spouse name: Not on file  Number of children: Not on file  Years of education: Not on file  Highest education level: Not on file Tobacco Use  Smoking status: Former Smoker  Tobacco comment: quit 4 years ago Substance and Sexual Activity  Alcohol use: Not Currently Comment: quit 3 years ago  Drug use: Never [facility] History reviewed. No pertinent family history. ALLERGIES No Known Allergies CURRENT MEDS Current Facility-Administered Medications Medication Dose Route Frequency Provider Last Rate Last Dose  donepezil (ARICEPT) tablet 5 mg  5 mg Oral QHS Adolfo Irizarry MD   5 mg at 10/03/19 2141  loperamide (IMODIUM) capsule 2 mg  2 mg Oral Q4H PRN Adolfo Irizarry MD   2 mg at 10/03/19 1203  spironolactone (ALDACTONE) tablet 50 mg  50 mg Oral DAILY Sherrie Quinones MD   50 mg at 10/04/19 6951  ferrous sulfate tablet 325 mg  1 Tab Oral BID WITH MEALS Adolfo Irizarry MD   325 mg at 10/04/19 1723  levothyroxine (SYNTHROID) tablet 25 mcg  25 mcg Oral 6am Adolfo Irizarry MD   25 mcg at 10/04/19 9962  carvedilol (COREG) tablet 3.125 mg  3.125 mg Oral BID Sherrie Quinones MD   3.125 mg at 10/04/19 2791  sacubitril-valsartan (ENTRESTO) 24-26 mg tablet 1 Tab  1 Tab Oral Q12H Roland Zaidi MD   1 Tab at 10/04/19 9234  sodium chloride (NS) flush 5-40 mL  5-40 mL IntraVENous Q8H Gio Lozada MD   10 mL at 10/04/19 1342  sodium chloride (NS) flush 5-40 mL  5-40 mL IntraVENous PRN Gio Lozada MD      
 heparin (porcine) injection 5,000 Units  5,000 Units SubCUTAneous Q8H Gio Lozada MD   5,000 Units at 10/04/19 1340  acetaminophen (TYLENOL) tablet 650 mg  650 mg Oral Q4H PRN Gio Lozada MD      
 ondansetron (ZOFRAN ODT) tablet 4 mg  4 mg Oral Q4H PRN Gio Lozada MD      
 
 
ROS As per HPI 
 
LABS Recent Results (from the past 48 hour(s)) MAGNESIUM Collection Time: 10/03/19 12:11 AM  
Result Value Ref Range Magnesium 2.2 1.6 - 2.4 mg/dL METABOLIC PANEL, BASIC Collection Time: 10/03/19 12:11 AM  
Result Value Ref Range Sodium 137 136 - 145 mmol/L Potassium 3.6 3.5 - 5.1 mmol/L Chloride 106 97 - 108 mmol/L  
 CO2 22 21 - 32 mmol/L Anion gap 9 5 - 15 mmol/L Glucose 121 (H) 65 - 100 mg/dL BUN 19 6 - 20 MG/DL Creatinine 0.83 0.70 - 1.30 MG/DL  
 BUN/Creatinine ratio 23 (H) 12 - 20 GFR est AA >60 >60 ml/min/1.73m2 GFR est non-AA >60 >60 ml/min/1.73m2 Calcium 8.6 8.5 - 17.1 MG/DL  
METABOLIC PANEL, BASIC Collection Time: 10/03/19  5:05 AM  
Result Value Ref Range Sodium 139 136 - 145 mmol/L Potassium 3.4 (L) 3.5 - 5.1 mmol/L Chloride 107 97 - 108 mmol/L  
 CO2 23 21 - 32 mmol/L Anion gap 9 5 - 15 mmol/L Glucose 112 (H) 65 - 100 mg/dL BUN 17 6 - 20 MG/DL Creatinine 0.79 0.70 - 1.30 MG/DL  
 BUN/Creatinine ratio 22 (H) 12 - 20 GFR est AA >60 >60 ml/min/1.73m2 GFR est non-AA >60 >60 ml/min/1.73m2 Calcium 8.2 (L) 8.5 - 10.1 MG/DL  
SED RATE (ESR) Collection Time: 10/04/19 10:11 AM  
Result Value Ref Range Sed rate, automated 3 0 - 20 mm/hr VITAMIN B12 Collection Time: 10/04/19 10:11 AM  
Result Value Ref Range Vitamin B12 >2,000 (H) 193 - 986 pg/mL LIPID PANEL  
 Collection Time: 10/04/19 10:11 AM  
Result Value Ref Range LIPID PROFILE Cholesterol, total 131 <200 MG/DL Triglyceride 76 <150 MG/DL  
 HDL Cholesterol 39 MG/DL  
 LDL, calculated 76.8 0 - 100 MG/DL VLDL, calculated 15.2 MG/DL  
 CHOL/HDL Ratio 3.4 0.0 - 5.0 PHYSICAL EXAM 
Visit Vitals BP 98/69 (BP 1 Location: Right arm, BP Patient Position: Sitting) Pulse 75 Temp 97.4 °F (36.3 °C) Resp 18 Ht 5' 5\" (1.651 m) Wt 140 lb 14 oz (63.9 kg) SpO2 99% BMI 23.44 kg/m² General:  Alert, cooperative, no distress. Head:  Normocephalic, without obvious abnormality, atraumatic. Eyes:  Conjunctivae/corneas clear. Pupils equal, round, reactive to light. Extraocular movements intact, VFF, NO papilledema Lungs: 
Heart:   Non labored breathing Regular rate and rhythm, no carotid bruits Abdomen:   Soft, non-distended Extremities: Extremities normal, atraumatic, no cyanosis,  Edema+. Pulses: 2+ and symmetric all extremities. Skin: Skin color, texture, turgor normal. No rashes or lesions. Neurologic:  Gen: Attention normal 
           Language: naming, repetition, fluency normal 
           Memory: Abnormal 
Cranial Nerves: 
I: smell Not tested II: visual fields Full to confrontation II: pupils Equal, round, reactive to light II: optic disc No papilledema III,VII: ptosis none III,IV,VI: extraocular muscles  Full ROM V: mastication normal  
V: facial light touch sensation  normal  
VII: facial muscle function   symmetric VIII: hearing symmetric IX: soft palate elevation  normal  
XI: trapezius strength  5/5 XI: sternocleidomastoid strength 5/5 XI: neck flexion strength  5/5 XII: tongue  midline Motor: normal bulk and tone,  Tremor++ Strength: Moves all four extremities Sensory: Equivocal sensation to LT, PP, vibration, and temperature Coordination: FTN and HTS abnormal, 
Gait: Deferred Reflexes: 2+ throughout Plantar: Withdrawn Max Score Patient Score Question 5 0  What is the year? Season? Date? Day? Month?   
5 3  Where are we now? State? County? Town/city? Hospital? Floor? 3 3  Repeat names of three objects after 3 seconds 5 0  Ask patient for serial 7's or spell \"world\" backwards 3 0  Repeat same three objects after three minutes 2 1  Ask patient to name two common objects 1 1  Ask patient to say \"No ifs and or buts\" 3 3  Ask patient to do a 3 step command 1 0  Ask patient to write a sentence 1 0  Copy intersecting polygons 1 1  Read and do what is on the paper \"Close your eyes 12/30 IMPRESSION: 
1. Altered mental status 2. Dementia query type, possibly Wernicke's secondary to EtOH abuse 3. Cerebrovascular accident 4. Rule out seizure disorder RECOMMENDATIONS: 
1. MRI brain/ MRA head and carotids 2. TTE 
3. Telemetry 4. Permissive HTN (SBP<180/<100) 5. Stroke labs (HgbA1c, TSH, lipid panel) 6. Start ASA 325mg daily 7. Start statin 8. OT/PT eval ordered and will assess patient for rehab 9. EEG 10. DT precaution 11. VTE prophylaxis: 12.  Lab for B12, ESR, RPR, vitamin D 13. Vitamin B1 100 mg p.o. daily Thank you very much for this consultation.   
 
 
 
Eddie Estevez MD

## 2019-10-05 NOTE — PROGRESS NOTES
PROCEDURE: ROUTINE INPATIENT EEG 
NAME:   Jose Wood EEG NUMBER: [id number] ACCOUNT NUMBER : [de-identified] MRN:   [id number] DATE OF SERVICE: 10/4/2019 HISTORY/INDICATION: Altered mental status MEDICATIONS: See chart CONDITIONS OF RECORDING: This is a routine 21-channel EEG recording performed in accordance with the international 10-20 system with one channel devoted to limited EKG. This study was done during states of wakefulness. Photic stimulation and hyperventilation were performed as activating procedures. In addition, eye movement was recorded DESCRIPTION:  
Upon maximal arousal the posterior dominant rhythm has a frequency of 7-9 hz with an amplitude of 20 uV. This activity is symmetric over the bilateral posterior derivations and attenuates with eye opening. Photic stimulation and hyperventilation do not significantly alter the tracing. The patient becomes drowsy, but deeper stages of sleep are not attained . Normal sleep architecture is seen with stage II sleep recognized by the presence of symmetric vertex waves and sleep spindles. There are no focal abnormalities, epileptiform discharges, or electrographic seizures seen. INTERPRETATION:  
The EEG was dominated by slowing of most of the activities in the range of 3-4 delta activities alternating with 4-5 delta activities. This is an abnormal EEG,it is consistent with Cerebral disturbance, Encephalopathy versus Neurodegenerative disease.  
 
 
Nano Mitchell MD

## 2019-10-05 NOTE — PROGRESS NOTES
Hospitalist Progress Note NAME: Crissy Sloan :  1950 MRN:  [id number] Room Number:  595/62  @ [facility] Summary: 78 y.o. male whom presented on 2019 with Assessment / Plan: 
Update- 
Please review earlier detailed progress notes for details on family meeting 10/3 New onset Systolic Congestive Heart Failure, EF~20% - Cardiology input appreciated - Strict Is and Os, 2 g Sodium cardiac diet, daily weights. 
- ECHO -echo shows extremely poor LV function, ejection fraction well under 20% with a spherical mass in the LV apex ,Possible long-standing granulomatous disease. Started on aldactone and Entresto by cards. Start coreg,c/w aldactone(inc dose),entresto AWAIT palliative consult to d/w goals of care/care decisions  
  
Dyspnea on exertion from above and left pleural effusion - IR guided thoracentesis of left pleural effusion, diagnostic and therapeutic if no improvement after diureses Undiagnosed dementia Collateral history and clinical progression highly suggestive of dementia.   
Start donepezil Appreciate psych input. Patient clearly does not have capacity to make complex medical decisions. Hypothyroidism TSH elevated, Free T4 borderline low Start low dose levothyroxine Elevated lactic acid : -resolved with gentle hydration Elevated troponin : 
EKG shows sinus arrhythmia, T wave inversion in V5-V6. Troponin 0.07. Likely d/t congestive heart failure. Microcytic hypochromic anemia :likely iron def Start iron tabs 
  
Code Status:  FULL Surrogate Decision Maker: sisterMinisterio  [phone] Rosemary Spine [phone] 
  
DVT Prophylaxis: Hep SQ and SCD's 
GI Prophylaxis: not indicated 
  
Baseline:  unknown Subjective: Chief Complaint / Reason for Physician Visit When will I go home? My legs are still swollen Review of Systems: No fevers, chills, appetite change, cough, sputum production, shortness of breath, dyspnea on exertion, nausea, vomitting, diarrhea, constipation, chest pain, leg edema, abdominal pain, joint pain, rash, itching. Tolerating diet. Objective: VITALS:  
Last 24hrs VS reviewed since prior progress note. Most recent are: 
Patient Vitals for the past 24 hrs: 
 Temp Pulse Resp BP SpO2  
10/05/19 0910 96.5 °F (35.8 °C)      
10/05/19 0822 95.3 °F (35.2 °C) 69 16 (!) 125/95 100 % 10/05/19 0800  71     
10/05/19 0430 97.5 °F (36.4 °C) 83 18 119/83 99 % 10/04/19 2019 97.2 °F (36.2 °C) 89 18 111/82 99 % 10/04/19 1630  75 18 98/69 99 % Intake/Output Summary (Last 24 hours) at 10/5/2019 7909 Last data filed at 10/4/2019 1630 Gross per 24 hour Intake 240 ml Output 1 ml Net 239 ml PHYSICAL EXAM: 
General: WD, WN. Alert, cooperative, no acute distress EENT:  EOMI. Anicteric sclerae. MMM Resp:  Decreased breath sounds at right lower lung field and left lower and middle lung fields, mild crackles in right base, no accessory muscle use. CV:  Regular rate rhythm,  normal S1/S2, no murmurs rubs gallops, 3+ bilaterally symmetrical lower extremity edema GI:  Soft, Non distended, Non tender. +Bowel sounds Neurologic:  Alert and oriented X 1, normal speech, no focal neurological deficit with the exception of blindness in left eye Psych:   Not anxious nor agitated Skin:  No rashes. No jaundice Reviewed most current lab test results and cultures  YES Reviewed most current radiology test results   YES Review and summation of old records today    NO Reviewed patient's current orders and MAR    YES 
PMH/SH reviewed - no change compared to H&P 
________________________________________________________________________ Care Plan discussed with: 
  Comments Patient  x Family   x  
RN  x Care Manager  x Consultant   x Multidiciplinary team rounds were held today with , nursing, pharmacist and clinical coordinator. Patient's plan of care was discussed; medications were reviewed and discharge planning was addressed. ________________________________________________________________________ Total NON critical care TIME:  20  Minutes Total CRITICAL CARE TIME Spent:   Minutes non procedure based Comments >50% of visit spent in counseling and coordination of care    
________________________________________________________________________ Marcellus Sun MD  
 
Procedures: see electronic medical records for all procedures/Xrays and details which were not copied into this note but were reviewed prior to creation of Plan. LABS: 
I reviewed today's most current labs and imaging studies. Pertinent labs include: No results for input(s): WBC, HGB, HCT, PLT, HGBEXT, HCTEXT, PLTEXT, HGBEXT, HCTEXT, PLTEXT in the last 72 hours. Recent Labs 10/05/19 
0410 10/03/19 
0505 10/03/19 
0011 * 139 137  
K 4.4 3.4* 3.6  107 106 CO2 21 23 22 GLU  --  112* 121* BUN  --  17 19 CREA  --  0.79 0.83 CA  --  8.2* 8.6 MG  --   --  2.2 Signed: Marcellus Sun MD

## 2019-10-05 NOTE — PROGRESS NOTES
0820) unable to obtain oral or axillary temperature. Pt 95.3 rectally. Temperature adjusted in room. Extra blankets and heat packs given. Pt refuse breakfast 
0825) Discuss temperature with Dr. Pedro Edwards. Order for BairHugger 
9399) pt rectal temperature 96.5 
0930) pt refuse breakfast at this time. 1209) pt took off BairHugger, gowns and blankets 1220) pt large loose stool incontinence. CHG bath given and gown changed. Pt request a shave. Pt refused linen change; refused chair; refused temperature check. Pt stated \"get out of my room\" \"you're up to something\"  \"no\" \"no\" \"no\" pt refused lunch. Pt unable to state where he was; anxious about people walking past door. Ask if I could lock his door. 1102 [facility] ISORG, PCT assist pt to drink tea and eat soup. Pt ark RN to leave [employer] 1345) pt ask about head sticking out of window, while pointing at door. 1350) Discuss with Dr. Pedro Edwards, pt increased anxiety; refused Lele hugger 1410) pt ask for plastic fork, in case women in the window comes in.  
1420) Discuss with Dr. Pedro Edwards, order for oral haldol if needed for agitation, able to give IM haldol. Pt currently resting in bed quietly but anxious about a woman coming into his room from the window. 1445) pt temperature 96.4 axillary. Pt allow vitals on R side of body, so he was able to keep watching the door for a woman. 56) pt nephew Vladimirchris Rios [phone] visit with his wife who is a nurse and his children. Pt permission to discuss care. Pt recognize \"Jovan\" but not other family. Notify Karolina, care manager, stanew offer to help with paperwork needed. 1900) Bedside shift change report given to Ed Castro RN and JERRI Hanna (oncoming nurse) by Linda Cox RN and Ting Flores LPN (offgoing nurse). Report included the following information SBAR, Kardex, MAR and Cardiac Rhythm NSR with PVC's

## 2019-10-05 NOTE — PROGRESS NOTES
Bedside shift change report given to GABY Dia/Antonio GUTHRIE (oncoming nurse) by Amara Reed (offgoing nurse). Report included the following information SBAR, Kardex, Intake/Output, MAR, Recent Results and Cardiac Rhythm NSR w PVC's.

## 2019-10-05 NOTE — PROGRESS NOTES
Cardiology: Potassium is fully corrected and should be monitored for overshoot. Hypothermia is worrisome. ACE is normal. No clear explanation at present for the mass in the LV apex. CBC ordered for comparison to admission data because of temperature fluctuations.   
 
Barbara Thomas MD [facility] [place]

## 2019-10-05 NOTE — PROGRESS NOTES
Cardiology Progress Note 
 
 
10/5/2019 11:50 AM 
 
Admit Date: 9/30/2019 Admit Diagnosis: Altered mental state [R41.82] Heart failure ([address] [I50.9] Subjective:  
 
Elyssa Zuniga reports feeling hot and having difficulty staying asleep. Pt was placed on a BairHugger overnight d/t decreased core temp, per review of nursing notes. Pt has kicked off his blankets. Visit Vitals /79 Pulse 69 Temp 96.1 °F (35.6 °C) Resp 20 Ht 5' 5\" (1.651 m) Wt 141 lb 11.2 oz (64.3 kg) SpO2 100% BMI 23.58 kg/m² Current Facility-Administered Medications Medication Dose Route Frequency  donepezil (ARICEPT) tablet 5 mg  5 mg Oral QHS  loperamide (IMODIUM) capsule 2 mg  2 mg Oral Q4H PRN  
 spironolactone (ALDACTONE) tablet 50 mg  50 mg Oral DAILY  ferrous sulfate tablet 325 mg  1 Tab Oral BID WITH MEALS  
 levothyroxine (SYNTHROID) tablet 25 mcg  25 mcg Oral 6am  
 carvedilol (COREG) tablet 3.125 mg  3.125 mg Oral BID  sacubitril-valsartan (ENTRESTO) 24-26 mg tablet 1 Tab  1 Tab Oral Q12H  
 sodium chloride (NS) flush 5-40 mL  5-40 mL IntraVENous Q8H  
 sodium chloride (NS) flush 5-40 mL  5-40 mL IntraVENous PRN  
 heparin (porcine) injection 5,000 Units  5,000 Units SubCUTAneous Q8H  
 acetaminophen (TYLENOL) tablet 650 mg  650 mg Oral Q4H PRN  
 ondansetron (ZOFRAN ODT) tablet 4 mg  4 mg Oral Q4H PRN  
   
A comprehensive ROS was not obtained due to patient factors. Objective:  
  
Physical Exam: 
Neck: supple, symmetrical, trachea midline, no adenopathy, thyroid: not enlarged, symmetric, no tenderness/mass/nodules, no carotid bruit and mild JVD. Heart: regular rate and rhythm, S1, S2 normal, no murmur, click, rub or gallop Lungs: rhonchi R anterior, L anterior, wheezes R anterior, L anterior Abdomen: soft, non-tender. Bowel sounds normal. No masses,  no organomegaly Extremities: no ulcers, gangrene or trophic changes, edema 3+ pitting bilaterally Pulses: 2+ and symmetric Neurologic: Grossly normal 
Gen: WD thin elderly man laying supine in bed without sheets or blankets. Somewhat confused by noises in the hallway, Unable to fully answer my questions. NAD. Data Review:  
Labs:   
Recent Results (from the past 24 hour(s)) ELECTROLYTES Collection Time: 10/05/19  4:10 AM  
Result Value Ref Range Sodium 134 (L) 136 - 145 mmol/L Potassium 4.4 3.5 - 5.1 mmol/L Chloride 102 97 - 108 mmol/L  
 CO2 21 21 - 32 mmol/L Anion gap 11 5 - 15 mmol/L Telemetry: normal sinus rhythm, few PVCs Assessment & Plan: Active Problems: 
  Altered mental state (9/30/2019) Heart failure ([address] (10/1/2019) Electrolyte imbalance: potassium improving at 3.4; NA still low at 134. Continue Spironolactone. This pt may possibly be septic: pt with low core temp, positive wound culture showing e. Coli and high lactic acid. No changes to plan from a cardiology standpoint. Continue current medications. Discussed with Dr. Farzaneh Goddard.  
 
Deisi Sy PA-C

## 2019-10-06 NOTE — PROGRESS NOTES
Hospitalist Progress Note NAME: Elyssa Zuniga :  1950 MRN:  [id number] Room Number:  366/11  @ [facility] Summary: 78 y.o. male whom presented on 2019 with Assessment / Plan: 
Update- 
Please review earlier detailed progress notes for details on family meeting 10/3 NO s/s OF INFECTION. Patient sitting of chair today and denies any c/o. VS have been stable this am. 
 
New onset Systolic Congestive Heart Failure, EF~20% - Cardiology input appreciated - Strict Is and Os, 2 g Sodium cardiac diet, daily weights. 
- ECHO -echo shows extremely poor LV function, ejection fraction well under 20% with a spherical mass in the LV apex ,Possible long-standing granulomatous disease. Started on aldactone and Entresto by cards. Start coreg,c/w aldactone(inc dose),entresto AWAIT palliative consult to d/w goals of care/care decisions  
  
Dyspnea on exertion from above and left pleural effusion - IR guided thoracentesis of left pleural effusion, diagnostic and therapeutic if no improvement after diureses Undiagnosed dementia Collateral history and clinical progression highly suggestive of dementia.   
Start donepezil Appreciate psych input. Patient clearly does not have capacity to make complex medical decisions. Neurology on board Hypothyroidism TSH elevated, Free T4 borderline low Start low dose levothyroxine Elevated lactic acid : -resolved with gentle hydration Elevated troponin : 
EKG shows sinus arrhythmia, T wave inversion in V5-V6. Troponin 0.07. Likely d/t congestive heart failure. Microcytic hypochromic anemia :likely iron def Start iron tabs 
  
Code Status:  FULL Surrogate Decision Maker: sister, Tomer Clark  [phone] Peter King [phone] 
  
DVT Prophylaxis: Hep SQ and SCD's 
GI Prophylaxis: not indicated 
  
Baseline:  unknown Subjective: Chief Complaint / Reason for Physician Visit I am fine, appetite is better Review of Systems: No fevers, chills, appetite change, cough, sputum production, shortness of breath, dyspnea on exertion, nausea, vomitting, diarrhea, constipation, chest pain, leg edema, abdominal pain, joint pain, rash, itching. Tolerating diet. Objective: VITALS:  
Last 24hrs VS reviewed since prior progress note. Most recent are: 
Patient Vitals for the past 24 hrs: 
 Temp Pulse Resp BP SpO2  
10/06/19 0822 96.4 °F (35.8 °C) 82 16 123/90 100 % 10/06/19 0406 97.1 °F (36.2 °C) 79 16 103/73 100 % 10/05/19 2333 95.8 °F (35.4 °C) 89 16 97/79 100 % 10/05/19 1952 96.7 °F (35.9 °C) 88 16 108/83 100 % 10/05/19 1600  79     
10/05/19 1445 96.8 °F (36 °C) 78 16 97/73 99 % 10/05/19 1200  82     
10/05/19 1102 96.1 °F (35.6 °C) 69 20 131/79 100 % Intake/Output Summary (Last 24 hours) at 10/6/2019 1059 Last data filed at 10/6/2019 8860 Gross per 24 hour Intake 720 ml Output  Net 720 ml PHYSICAL EXAM: 
General: WD, WN. Alert, cooperative, no acute distress EENT:  EOMI. Anicteric sclerae. MMM Resp:  Decreased breath sounds at right lower lung field and left lower and middle lung fields, mild crackles in right base, no accessory muscle use. CV:  Regular rate rhythm,  normal S1/S2, no murmurs rubs gallops, 3+ bilaterally symmetrical lower extremity edema GI:  Soft, Non distended, Non tender. +Bowel sounds Neurologic:  Alert and oriented X 1, normal speech, no focal neurological deficit with the exception of blindness in left eye Psych:   Not anxious nor agitated Skin:  No rashes. No jaundice Reviewed most current lab test results and cultures  YES Reviewed most current radiology test results   YES Review and summation of old records today    NO Reviewed patient's current orders and MAR    YES 
PMH/SH reviewed - no change compared to H&P 
________________________________________________________________________ Care Plan discussed with: 
  Comments Patient  x Family   x  
RN  x Care Manager  x Consultant   x Multidiciplinary team rounds were held today with , nursing, pharmacist and clinical coordinator. Patient's plan of care was discussed; medications were reviewed and discharge planning was addressed. ________________________________________________________________________ Total NON critical care TIME:  20  Minutes Total CRITICAL CARE TIME Spent:   Minutes non procedure based Comments >50% of visit spent in counseling and coordination of care    
________________________________________________________________________ Keiry Anton MD  
 
Procedures: see electronic medical records for all procedures/Xrays and details which were not copied into this note but were reviewed prior to creation of Plan. LABS: 
I reviewed today's most current labs and imaging studies. Pertinent labs include: No results for input(s): WBC, HGB, HCT, PLT, HGBEXT, HCTEXT, PLTEXT, HGBEXT, HCTEXT, PLTEXT in the last 72 hours. Recent Labs 10/05/19 
0410 * K 4.4  
 CO2 21 Signed: Keiry Anton MD

## 2019-10-06 NOTE — PROGRESS NOTES
Problem: Pressure Injury - Risk of 
Goal: *Prevention of pressure injury Description Document Yosvany Scale and appropriate interventions in the flowsheet. Outcome: Progressing Towards Goal 
Note:  
Pressure Injury Interventions: 
Sensory Interventions: Assess changes in LOC, Minimize linen layers, Keep linens dry and wrinkle-free Moisture Interventions: Absorbent underpads, Minimize layers Activity Interventions: PT/OT evaluation Mobility Interventions: HOB 30 degrees or less Nutrition Interventions: Document food/fluid/supplement intake, Offer support with meals,snacks and hydration Friction and Shear Interventions: Minimize layers, Foam dressings/transparent film/skin sealants Problem: Patient Education: Go to Patient Education Activity Goal: Patient/Family Education Outcome: Progressing Towards Goal 
  
Problem: Falls - Risk of 
Goal: *Absence of Falls Description Document Erick Silva Fall Risk and appropriate interventions in the flowsheet. Outcome: Progressing Towards Goal 
Note:  
Fall Risk Interventions: 
Mobility Interventions: Bed/chair exit alarm, Patient to call before getting OOB Mentation Interventions: Bed/chair exit alarm, Toileting rounds, Update white board, Room close to nurse's station, Familiar objects from home Medication Interventions: Bed/chair exit alarm, Patient to call before getting OOB Elimination Interventions: Call light in reach, Urinal in reach, Bed/chair exit alarm Problem: Patient Education: Go to Patient Education Activity Goal: Patient/Family Education Outcome: Progressing Towards Goal 
  
Problem: Patient Education: Go to Patient Education Activity Goal: Patient/Family Education Outcome: Progressing Towards Goal 
  
Problem: Cardiac Output -  Decreased Goal: *Absence of hypoxia Outcome: Progressing Towards Goal 
Goal: *Intravascular fluid volume and electrolyte balance Outcome: Progressing Towards Goal

## 2019-10-06 NOTE — PROGRESS NOTES
Initial Nutrition Assessment: 
 
INTERVENTIONS/RECOMMENDATIONS:  
·  comfort foods as medically appropriate ·   
· ASSESSMENT:  
Pt admitted with AMS, heart failure. PT currently NPO. Per cardiology, encephalopathy with poor prognosis. CHF management now for comfort. Hx also notable for hypothyroidism and pt has 2 open/draining areas on buttocks. Wound care consulted. Family meeting has taken place, palliative care consulted. Diet Order: NPO(was cardiac low Na+) % Eaten:   
Patient Vitals for the past 72 hrs: 
 % Diet Eaten 10/06/19 0946 50 % 10/05/19 1735 10 % 10/05/19 1343 100 % 10/04/19 1228 5 % 10/04/19 0810 0 % Pertinent Medications: [x]Reviewed []Other Pertinent Labs: [x]Reviewed []Other Food Allergies: [x]None []Other Last BM:    [x]Active     []Hyperactive  []Hypoactive       [] Absent BS Skin:    [] Intact   [] Incision  [x] Breakdown  [] Other: Anthropometrics:  
Height: 5' 5\" (165.1 cm) Weight: 64.2 kg (141 lb 8 oz) IBW (%IBW): 64.4 kg (142 lb) (99.65 %) UBW (%UBW):   (  %) Last Weight Metrics: 
Weight Loss Metrics 10/6/2019 9/30/2019 Today's Wt 141 lb 8 oz -  
BMI - 23.55 kg/m2 BMI: Body mass index is 23.55 kg/m². This BMI is indicative of: 
 []Underweight    [x]Normal    []Overweight    [] Obesity   [] Extreme Obesity (BMI>40) Estimated Nutrition Needs (Based on):  
9820 Kcals/day(1545 x 1.2) , 67 g(~ 1 g/kg) Protein Carbohydrate: At Least 130 g/day  Fluids: 1280 mL/day (MD order) NUTRITION DIAGNOSES:  
Problem:  No nutritional diagnosis at this time Etiology: related to Signs/Symptoms: as evidenced by NUTRITION INTERVENTIONS: 
              comfort only GOAL:  
some po intake for comfort LEARNING NEEDS (Diet, Food/Nutrient-Drug Interaction):  
 [x] None Identified 
 [] Identified and Education Provided/Documented 
 [] Identified and Pt declined/was not appropriate Cultureal, Islam, OR Ethnic Dietary Needs: [x] None Identified 
 [] Identified and Addressed 
 
 [x] Interdisciplinary Care Plan Reviewed/Documented  
 [x] Discharge Planning:  Comfort care MONITORING Lila Osborne Outcomes: Behavior Food/Nutrient Intake Outcomes: Total energy intake Physical Signs/Symptoms Outcomes: Other (comment) NUTRITION RISK:  
 [x] Patient At Nutritional Risk  
 [] Patient Not At Nutritional Risk PT SEEN FOR:  
 []  MD Consult: []Calorie Count []Diabetic Diet Education []Diet Education []Electrolyte Management 
   [x]General Nutrition Management and Supplements []Management of Tube Feeding []TPN Recommendations []  RN Referral:  []MST score >=2 
   []Enteral/Parenteral Nutrition PTA []Pregnant: Gestational DM or Multigestation 
   []Pressure Ulcer/Wound Care needs 
     
[]  Low BMI 
[]  MAYANK Terrazas, PhD, RD, [facility] Pager [phone]

## 2019-10-06 NOTE — PROGRESS NOTES
Cardiology: Mr. Willy Calixto will need to be watched for possible hyponatremia. New CBC yesterday does not suggest systemic infection. Blood pressure is acceptable, the lowest recorded pulse in the last 24 hours is 69/min. Coreg can reasonably be increased today. The EEG result is noted. Findings are consistent with encephalopathy versus neurodegenerative disease. It does not appear that there is any clear etiology for reversible encephalopathy, and prognosis is therefore poor.   Ongoing CHF management is for comfort at this point rather than Bg Goddard MD, [facility]

## 2019-10-06 NOTE — PROGRESS NOTES
Bedside shift change report given to GABY Dia/JERRI Kulkarni (oncoming nurse) by Daren Avilez (offgoing nurse). Report included the following information SBAR, Kardex, Intake/Output, MAR and Cardiac Rhythm NSR w/ PVC's.

## 2019-10-06 NOTE — PROGRESS NOTES
Bedside and Verbal shift change report given to Akilah Rapp RN (oncoming nurse) by Josesito Tang RN and Belkys Rojas LPN (offgoing nurse). Report included the following information SBAR, Kardex and Cardiac Rhythm NSR and PVC's.

## 2019-10-06 NOTE — PROGRESS NOTES
Neurology Progress Note NAME:  Shy Yun :   1950 MRN:   [id number] Date/Time:  10/6/2019 11:31 AM 
Subjective: No new complaints. Review of Systems: Y  N       Y  N 
[] [x]  Fever/chills                                               [] [x]  Chest Pain 
[] [x]  Cough                                                       [] [x]  Diarrhea  
[] [x]  Sputum                                                     [] [x]  Constipation 
[] [x]  SOB/LANDA                                                [] [x]  Nausea/Vomit 
[] [x]  Abd Pain                                                    [x] []  Tolerating PT 
[] [x]  Dysuria                                                      [x] []  Tolerating Diet Medications reviewed: 
Current Facility-Administered Medications Medication Dose Route Frequency  haloperidol lactate (HALDOL) injection 2 mg  2 mg IntraMUSCular Q8H PRN  
 haloperidol (HALDOL) tablet 2 mg  2 mg Oral Q8H PRN  
 donepezil (ARICEPT) tablet 5 mg  5 mg Oral QHS  loperamide (IMODIUM) capsule 2 mg  2 mg Oral Q4H PRN  
 spironolactone (ALDACTONE) tablet 50 mg  50 mg Oral DAILY  ferrous sulfate tablet 325 mg  1 Tab Oral BID WITH MEALS  
 levothyroxine (SYNTHROID) tablet 25 mcg  25 mcg Oral 6am  
 carvedilol (COREG) tablet 3.125 mg  3.125 mg Oral BID  sacubitril-valsartan (ENTRESTO) 24-26 mg tablet 1 Tab  1 Tab Oral Q12H  
 sodium chloride (NS) flush 5-40 mL  5-40 mL IntraVENous Q8H  
 sodium chloride (NS) flush 5-40 mL  5-40 mL IntraVENous PRN  
 heparin (porcine) injection 5,000 Units  5,000 Units SubCUTAneous Q8H  
 acetaminophen (TYLENOL) tablet 650 mg  650 mg Oral Q4H PRN  
 ondansetron (ZOFRAN ODT) tablet 4 mg  4 mg Oral Q4H PRN Objective:  
Vitals: 
Visit Vitals /90 (BP 1 Location: Right arm, BP Patient Position: Sitting) Pulse 82 Temp 96.4 °F (35.8 °C) Resp 16 Ht 5' 5\" (1.651 m) Wt 141 lb 8 oz (64.2 kg) SpO2 100% BMI 23.55 kg/m² Temp (24hrs), Av.6 °F (35.9 °C), Min:95.8 °F (35.4 °C), Max:97.1 °F (36.2 °C) O2 Flow Rate (L/min): 2 l/min O2 Device: Room air PHYSICAL EXAM: 
General:    Alert, cooperative, no distress, appears stated age. Head:   Normocephalic, without obvious abnormality, atraumatic. Eyes:   Conjunctivae/corneas clear. PERRLA Nose:  Nares normal. No drainage or sinus tenderness. Throat:    Lips, mucosa, and tongue normal.  No Thrush Neck:  Supple, symmetrical,  no adenopathy, thyroid: non tender 
  no carotid bruit and no JVD. Back:    Symmetric,  No CVA tenderness. Lungs:   Clear to auscultation bilaterally. No Wheezing or Rhonchi. No rales. Chest wall:  No tenderness or deformity. No Accessory muscle use. Heart:   Regular rate and rhythm,  no murmur, rub or gallop. Abdomen:   Soft, non-tender. Not distended. Bowel sounds normal. No masses Extremities: Extremities normal, atraumatic, No cyanosis. Edema+. No clubbing Skin:     Texture, turgor normal. No rashes or lesions. Not Jaundiced Lymph nodes: Cervical, supraclavicular normal. 
Psych:  Depressed. Not anxious or agitated. NEUROLOGICAL EXAM: 
Appearance: The patient is well developed, malnourished,  and is in no acute distress. Mental Status: Oriented to time, place and person. Mood and affect appropriate. Cranial Nerves:   Intact visual fields. Fundi are benign. MILDRED, EOM's full, no nystagmus, no ptosis. Facial sensation is normal. Corneal reflexes are intact. Facial movement is symmetric. Hearing is normal bilaterally. Palate is midline with normal sternocleidomastoid and trapezius muscles are normal. Tongue is midline. Motor:   Moves all of the extremities. Normal bulk and tone. No fasciculations. Reflexes:   Deep tendon reflexes 2+/4 and symmetrical.  
Sensory:    Decreased sensation to touch, pinprick and vibration bilateral lower extremity up to the knee. Gait . Deferred Tremor:   No tremor noted. Cerebellar:  No cerebellar signs present. Neurovascular:  Normal heart sounds and regular rhythm, peripheral pulses intact, and no carotid bruits. Lab Data Reviewed: 
 
No results found for this or any previous visit (from the past 24 hour(s)). Assesment Active Problems: 
  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 
___________________________________________________ PLAN: 
Awaiting brain MRI, do not know why MRI was not done Vitamin B1 100 mg p.o. daily Continue current management 
 
 
 : 
 
___________________________________________________ Attending Physician: Nilay Manriquez MD

## 2019-10-07 NOTE — PROGRESS NOTES
Cardiology: I reviewed the unusual echo findings with Radha Suarez and Cherylene Ion. 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 I ordered the echinococcus IgG. Brain MRI may be helpful.   
 
Mirtha Salazar MD

## 2019-10-07 NOTE — PROGRESS NOTES
**Consult Information** 
Member Facility: [address] [place] [address] MRN: [id number] Consult ID: [id number] Facility Time Zone: ET 
Date and Time of Consult: 10/07/2019 04:37:35 AM 
Requesting Clinician: . Patient Name: Victoriano Ro Date of Birth: [id number] Gender: Male **Clinical Note** Clinical Note: 78 yr old with LVEF < 15% , noted to have NSVT 
 
  - check ECG, electrolytes. - increase coreg to 6.25 mg bid **Attestation** Interaction Mode: Electronic Interaction Interaction Attestation: Interprofessional internet consultation was delivered through telephonic and/ or electronic communication upon the request of the patients treating physician, while the requesting and the rendering provider were not in the same physical location. Written report was provided to the requesting provider. Evaluation Duration (mins): 2

## 2019-10-07 NOTE — PROGRESS NOTES
Palliative Medicine [facility]: [phone] ([phone]) [facility]: [phone] ([phone]) Code Status: Full Code Advance Care Planning: 
Advance Care Planning 10/5/2019 Confirm Advance Directive None Otilia Sarmiento Supplemental (Other) Decision Maker: Ynes Brown Sister - [phone] Supplemental (Other) Decision Maker: Beena Hamilton - Sister - [phone] Supplemental (Other) Decision Maker: Dolores Villegas - Brother - [phone] Sister Haris Yoder unable to participate in patient care due to traumatic brain injury. Family depends heavily on Family meeting scheduled for Thursday 10/10 11 am. 
 
I have spoken with Dr. Fely You, [address] [place] [address] and Mr. Zhang's (\"Uncle Erlinda\") family including sister/RAFAEL Townsend, sister/LNLISBET Finch and nephew Leeseth Daija. The family tends to rely on Gabriela Choe for all decision making and we discussed Meagan Quintana guidelines for medical decision making. Family aware that Mr. Jo Ann Wallace are majority of his siblings (he is not /no kids). Family is confident they can come together on plan of care for patient. Sister Mil Townsend is closest to patient. She told me his memory is gone (\"he's a sweet person but his mind isn't right. \") and he was no longer safe to live at home alone which is why she initiated getting him to hospital. She said she didn't want to see patient \"contained\" (she could not clarify what that meant) and did not want to see him suffer. She feels very motherly toward him. Sister Flor Finch hasn't seen patient in 3 years and only talked to him briefly on phone from time to time. She said she heard from Mil Townsend about his memory problems. She also said she would want patient to be moved to [place] to be near her. Jose Raines very understanding of importance of family working together to support patient. He will work from his end to get siblings to hospital or on phone for meeting. He will attend in person. Patient has pending medicaid application. Thank you for the opportunity to be involved in the care of Mr. Willy Calixto and his family. Gregoria Diallo LMSW, Supervisee in Social Work Palliative Medicine  [phone]

## 2019-10-07 NOTE — PROGRESS NOTES
[facility] contact patient nephew Snow Lowry [phone]. States he is the oldest grandson and oldest nephew. Family depend and rely on him for decision making. He confirm patient never  no children have 4 other siblings (3 sisters 1 brother). 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 Mr. Santos Navas is willing to get the whole family together for meeting to discuss goals of care. 1030 CM discuss with palliative conversation with patient nephew. [address] [place] [address] 
[phone]

## 2019-10-07 NOTE — PROGRESS NOTES
7038 - Pt tele alarmed with 14 beat run of VT. Copy on chart. Pt asymptomatic and sleeping. VSS. MD tele notified.

## 2019-10-07 NOTE — PROGRESS NOTES
Hospitalist Progress Note NAME: Gabi Bear :  1950 MRN:  [id number] Room Number:  846/94  @ [facility] Summary: 78 y.o. male whom presented on 2019 with Assessment / Plan: 
Update- 
Please review earlier detailed progress notes for details on family meeting 10/3 Brain MRI today Palliative care team on board New onset Systolic Congestive Heart Failure, EF~20% - Cardiology input appreciated - Strict Is and Os, 2 g Sodium cardiac diet, daily weights. 
- ECHO -echo shows extremely poor LV function, ejection fraction well under 20% with a spherical mass in the LV apex ,Possible long-standing granulomatous disease. Started on aldactone and Entresto by cards. Start coreg,c/w aldactone(inc dose),entresto AWAIT palliative consult to d/w goals of care/care decisions  
  
Dyspnea on exertion from above and left pleural effusion - IR guided thoracentesis of left pleural effusion, diagnostic and therapeutic if no improvement after diureses Undiagnosed dementia Collateral history and clinical progression highly suggestive of dementia.   
Start donepezil Appreciate psych input. Patient clearly does not have capacity to make complex medical decisions. Neurology input appreciated, started on thiamine daily MRI brain-No acute intracranial process seen. 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 Old 
left occipital infarct. Hypothyroidism TSH elevated, Free T4 borderline low Start low dose levothyroxine Elevated lactic acid : -resolved with gentle hydration Elevated troponin : 
EKG shows sinus arrhythmia, T wave inversion in V5-V6. Troponin 0.07. Likely d/t congestive heart failure. Microcytic hypochromic anemia :likely iron def Start iron tabs 
  
Code Status:  FULL Surrogate Decision Maker: Jay razo  [phone] [place] [phone] 
  
DVT Prophylaxis: Hep SQ and SCD's 
GI Prophylaxis: not indicated   
Baseline:  unknown Subjective: Chief Complaint / Reason for Physician Visit 
 just came back from MRI, hungry at the moment Review of Systems: No fevers, chills, appetite change, cough, sputum production, shortness of breath, dyspnea on exertion, nausea, vomitting, diarrhea, constipation, chest pain, leg edema, abdominal pain, joint pain, rash, itching. Tolerating diet. Objective: VITALS:  
Last 24hrs VS reviewed since prior progress note. Most recent are: 
Patient Vitals for the past 24 hrs: 
 Temp Pulse Resp BP SpO2  
10/07/19 0753 97.6 °F (36.4 °C) 83 16 119/81 100 % 10/07/19 0428 98 °F (36.7 °C) 86 16 120/85 100 % 10/06/19 2131 97 °F (36.1 °C) 70 14 115/78 100 % 10/06/19 1523 97 °F (36.1 °C) 74 14 122/80 100 % 10/06/19 1227 97.6 °F (36.4 °C) 84 16 118/86 100 % Intake/Output Summary (Last 24 hours) at 10/7/2019 1052 Last data filed at 10/7/2019 8994 Gross per 24 hour Intake 240 ml Output  Net 240 ml PHYSICAL EXAM: 
General: WD, WN. Alert, cooperative, no acute distress EENT:  EOMI. Anicteric sclerae. MMM Resp:  Decreased breath sounds at right lower lung field and left lower and middle lung fields, mild crackles in right base, no accessory muscle use. CV:  Regular rate rhythm,  normal S1/S2, no murmurs rubs gallops, 3+ bilaterally symmetrical lower extremity edema GI:  Soft, Non distended, Non tender. +Bowel sounds Neurologic:  Alert and oriented X 1, normal speech, no focal neurological deficit with the exception of blindness in left eye Psych:   Not anxious nor agitated Skin:  No rashes. No jaundice Reviewed most current lab test results and cultures  YES Reviewed most current radiology test results   YES Review and summation of old records today    NO Reviewed patient's current orders and MAR    YES 
PMH/SH reviewed - no change compared to H&P 
________________________________________________________________________ Care Plan discussed with: 
  Comments Patient  x Family   x  
RN  x Care Manager  x Consultant   x Multidiciplinary team rounds were held today with , nursing, pharmacist and clinical coordinator. Patient's plan of care was discussed; medications were reviewed and discharge planning was addressed. ________________________________________________________________________ Total NON critical care TIME:  20  Minutes Total CRITICAL CARE TIME Spent:   Minutes non procedure based Comments >50% of visit spent in counseling and coordination of care    
________________________________________________________________________ Mis Macias MD  
 
Procedures: see electronic medical records for all procedures/Xrays and details which were not copied into this note but were reviewed prior to creation of Plan. LABS: 
I reviewed today's most current labs and imaging studies. Pertinent labs include: 
Recent Labs 10/07/19 
0445 WBC 4.3 HGB 10.5* HCT 34.3*  
 Recent Labs 10/07/19 
0445 10/05/19 
0410 * 134* K 4.4 4.4  102 CO2 21 21 *  --   
BUN 14  --   
CREA 0.95  --   
CA 8.3*  --   
 
 
Signed: Mis Macias MD

## 2019-10-07 NOTE — PROGRESS NOTES
Spiritual Care Partner Volunteer visited patient in Med Surg at [facility] on 10/07/2019. Documented by: 
Chloe Baltazar, [facility], Spiritual Care Intern

## 2019-10-07 NOTE — PROGRESS NOTES
Bedside and Verbal shift change report given to Danis Flores RN/Gretchen GUTHRIE (oncoming nurse) by Cyrus Lopez RN (offgoing nurse). Report included the following information SBAR, Kardex, Intake/Output and MAR. Telemetry SR with occasional PVC.

## 2019-10-07 NOTE — PROGRESS NOTES
Problem: Pressure Injury - Risk of 
Goal: *Prevention of pressure injury Description Document Yosvany Scale and appropriate interventions in the flowsheet. Outcome: Progressing Towards Goal 
Note:  
Pressure Injury Interventions: 
Sensory Interventions: Assess changes in LOC, Discuss PT/OT consult with provider, Keep linens dry and wrinkle-free, Minimize linen layers Moisture Interventions: Absorbent underpads, Check for incontinence Q2 hours and as needed Activity Interventions: Increase time out of bed, PT/OT evaluation Mobility Interventions: HOB 30 degrees or less, PT/OT evaluation Nutrition Interventions: Document food/fluid/supplement intake, Offer support with meals,snacks and hydration Friction and Shear Interventions: HOB 30 degrees or less, Minimize layers Problem: Patient Education: Go to Patient Education Activity Goal: Patient/Family Education Outcome: Progressing Towards Goal 
  
Problem: Falls - Risk of 
Goal: *Absence of Falls Description Document Ariel Jayden Fall Risk and appropriate interventions in the flowsheet. Outcome: Progressing Towards Goal 
Note:  
Fall Risk Interventions: 
Mobility Interventions: Bed/chair exit alarm, Patient to call before getting OOB Mentation Interventions: Adequate sleep, hydration, pain control, Bed/chair exit alarm, Door open when patient unattended, More frequent rounding, Room close to nurse's station Medication Interventions: Bed/chair exit alarm, Patient to call before getting OOB, Teach patient to arise slowly Elimination Interventions: Bed/chair exit alarm, Call light in reach, Urinal in reach Problem: Patient Education: Go to Patient Education Activity Goal: Patient/Family Education Outcome: Progressing Towards Goal 
  
Problem: Patient Education: Go to Patient Education Activity Goal: Patient/Family Education Outcome: Progressing Towards Goal 
  
 Problem: Patient Education: Go to Patient Education Activity Goal: Patient/Family Education Outcome: Progressing Towards Goal 
  
Problem: Cardiac Output -  Decreased Goal: *Vital signs within specified parameters Outcome: Progressing Towards Goal 
Goal: *Optimal cardiac output Outcome: Progressing Towards Goal 
Goal: *Absence of hypoxia Outcome: Progressing Towards Goal 
Goal: *Absence of peripheral edema Outcome: Progressing Towards Goal 
Goal: *Intravascular fluid volume and electrolyte balance Outcome: Progressing Towards Goal

## 2019-10-07 NOTE — PROGRESS NOTES
Late Entry 10/5/2019 @ [id number] CM receive call from 68 Anastasiya Alvarez states patient kevin wanted an update on patient and wants to know how he can help patient and family. Inform RN Patient will need Medicaid for longterm care and family can help by bringing any bank statement picture ID. [facility] 
[phone]

## 2019-10-08 NOTE — PROGRESS NOTES
Problem: Pressure Injury - Risk of 
Goal: *Prevention of pressure injury Description Document Yosvany Scale and appropriate interventions in the flowsheet. Outcome: Progressing Towards Goal 
Note:  
Pressure Injury Interventions: 
Sensory Interventions: Assess changes in LOC, Check visual cues for pain, Keep linens dry and wrinkle-free, Minimize linen layers Moisture Interventions: Absorbent underpads, Assess need for specialty bed, Check for incontinence Q2 hours and as needed, Maintain skin hydration (lotion/cream), Minimize layers, Offer toileting Q_hr Activity Interventions: Increase time out of bed Mobility Interventions: Assess need for specialty bed, HOB 30 degrees or less Nutrition Interventions: Document food/fluid/supplement intake, Offer support with meals,snacks and hydration Friction and Shear Interventions: Apply protective barrier, creams and emollients, Minimize layers

## 2019-10-08 NOTE — PROGRESS NOTES
Hospitalist Progress Note NAME: Jose Wood :  1950 MRN:  [id number] Room Number:  819/08  @ [facility] Summary: 78 y.o. male whom presented on 2019 with Assessment / Plan: 
Update- 
Please review earlier detailed progress notes for details on family meeting 10/3 Palliative care team on board Remains pleasantly confused New onset Systolic Congestive Heart Failure, EF~20% - Cardiology input appreciated - Strict Is and Os, 2 g Sodium cardiac diet, daily weights. 
- ECHO -echo shows extremely poor LV function, ejection fraction well under 20% with a spherical mass in the LV apex ,Possible long-standing granulomatous disease. Started on aldactone and Entresto by cards. Start coreg,c/w aldactone(inc dose),entresto AWAIT palliative consult to d/w goals of care/care decisions  
  
Dyspnea on exertion from above and left pleural effusion - IR guided thoracentesis of left pleural effusion, diagnostic and therapeutic if no improvement after diureses Undiagnosed dementia Collateral history and clinical progression highly suggestive of dementia.   
Start donepezil Appreciate psych input. Patient clearly does not have capacity to make complex medical decisions. Neurology input appreciated, started on thiamine daily MRI brain-No acute intracranial process seen. 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 Old left occipital infarct. Hypothyroidism TSH elevated, Free T4 borderline low Start low dose levothyroxine Elevated lactic acid : -resolved with gentle hydration Elevated troponin : 
EKG shows sinus arrhythmia, T wave inversion in V5-V6. Troponin 0.07. Likely d/t congestive heart failure. Microcytic hypochromic anemia :likely iron def Start iron tabs 
  
Code Status:  FULL Surrogate Decision Maker: sister, Leopoldo Beth  [phone] Jazmin Radha [phone] 
  
DVT Prophylaxis: Hep SQ and SCD's 
 GI Prophylaxis: not indicated 
  
Baseline:  unknown Subjective: Chief Complaint / Reason for Physician Visit I am sleepy this morning, not sure if I had my breakfast?\" 
had 2 loose stool per RN, no fever,chills,and pain Review of Systems: No fevers, chills, appetite change, cough, sputum production, shortness of breath, dyspnea on exertion, nausea, vomitting, diarrhea, constipation, chest pain, leg edema, abdominal pain, joint pain, rash, itching. Tolerating diet. Objective: VITALS:  
Last 24hrs VS reviewed since prior progress note. Most recent are: 
Patient Vitals for the past 24 hrs: 
 Temp Pulse Resp BP SpO2  
10/08/19 1103 96.7 °F (35.9 °C) 78 16 104/74 100 % 10/08/19 0728 97.9 °F (36.6 °C) 79 14 131/90 100 % 10/07/19 2017 98.2 °F (36.8 °C) 80 16 109/86 100 % 10/07/19 1656 96.8 °F (36 °C) 79 16 109/76 97 % 10/07/19 1300 97.4 °F (36.3 °C) 80 16 113/79 100 % Intake/Output Summary (Last 24 hours) at 10/8/2019 1235 Last data filed at 10/7/2019 1725 Gross per 24 hour Intake 474 ml Output  Net 474 ml PHYSICAL EXAM: 
General: WD, WN. Alert, cooperative, no acute distress EENT:  EOMI. Anicteric sclerae. MMM Resp:  Decreased breath sounds at right lower lung field and left lower and middle lung fields, mild crackles in right base, no accessory muscle use. CV:  Regular rate rhythm,  normal S1/S2, no murmurs rubs gallops, 3+ bilaterally symmetrical lower extremity edema GI:  Soft, Non distended, Non tender. +Bowel sounds Neurologic:  Alert and oriented X 1, normal speech, no focal neurological deficit with the exception of blindness in left eye Psych:   Not anxious nor agitated Skin:  No rashes. No jaundice Reviewed most current lab test results and cultures  YES Reviewed most current radiology test results   YES Review and summation of old records today    NO Reviewed patient's current orders and MAR    YES 
 PMH/SH reviewed - no change compared to H&P 
________________________________________________________________________ Care Plan discussed with: 
  Comments Patient  x Family   x  
RN  x Care Manager  x Consultant   x Multidiciplinary team rounds were held today with , nursing, pharmacist and clinical coordinator. Patient's plan of care was discussed; medications were reviewed and discharge planning was addressed. ________________________________________________________________________ Total NON critical care TIME:  20  Minutes Total CRITICAL CARE TIME Spent:   Minutes non procedure based Comments >50% of visit spent in counseling and coordination of care    
________________________________________________________________________ Bryanna Coleman MD  
 
Procedures: see electronic medical records for all procedures/Xrays and details which were not copied into this note but were reviewed prior to creation of Plan. LABS: 
I reviewed today's most current labs and imaging studies. Pertinent labs include: 
Recent Labs 10/07/19 
0445 WBC 4.3 HGB 10.5* HCT 34.3*  
 Recent Labs 10/07/19 
0445 * K 4.4  
 CO2 21 * BUN 14  
CREA 0.95  
CA 8.3* Signed: Bryanna Coleman MD

## 2019-10-08 NOTE — PROGRESS NOTES
Problem: Pressure Injury - Risk of 
Goal: *Prevention of pressure injury Description Document Yosvany Scale and appropriate interventions in the flowsheet. Outcome: Progressing Towards Goal 
Note:  
Pressure Injury Interventions: 
Sensory Interventions: Assess changes in LOC, Check visual cues for pain, Keep linens dry and wrinkle-free, Minimize linen layers Moisture Interventions: Absorbent underpads, Assess need for specialty bed, Check for incontinence Q2 hours and as needed, Maintain skin hydration (lotion/cream), Minimize layers, Offer toileting Q_hr Activity Interventions: Increase time out of bed Mobility Interventions: Assess need for specialty bed, HOB 30 degrees or less Nutrition Interventions: Document food/fluid/supplement intake, Offer support with meals,snacks and hydration Friction and Shear Interventions: Apply protective barrier, creams and emollients, Minimize layers Problem: Patient Education: Go to Patient Education Activity Goal: Patient/Family Education Outcome: Progressing Towards Goal 
  
Problem: Falls - Risk of 
Goal: *Absence of Falls Description Document Morteza Hughes Fall Risk and appropriate interventions in the flowsheet. Outcome: Progressing Towards Goal 
Note:  
Fall Risk Interventions: 
Mobility Interventions: Bed/chair exit alarm, Patient to call before getting OOB Mentation Interventions: Adequate sleep, hydration, pain control, Bed/chair exit alarm Medication Interventions: Bed/chair exit alarm, Patient to call before getting OOB Elimination Interventions: Bed/chair exit alarm, Call light in reach Problem: Patient Education: Go to Patient Education Activity Goal: Patient/Family Education Outcome: Progressing Towards Goal

## 2019-10-08 NOTE — PROGRESS NOTES
Pt bed alarm activated. Pt found on side of bed with tele box removed, dentures in hand and IV access removed. AO to self only. Requesting to be brought into his kitchen to cook his dinner. Attempted reorientation; placed back in bed. Bed alarmed. Hourly rounding required.

## 2019-10-08 NOTE — PROGRESS NOTES
Bedside and Verbal shift change report given to GABY Blake and [place], [address] [place] [address] (oncoming nurse) by Lana Schneider RN and Felicia Schuler LPN (offgoing nurse). Report included the following information SBAR and Kardex.

## 2019-10-08 NOTE — PROGRESS NOTES
Palliative Medicine Social Work Family meeting now scheduled for 10 am Thursday. Ruben Mattson is working to get patient siblings to hospital in person but it's possible they may need to talk by phone. Thank you for the opportunity to be involved in the care of Mr. Vicky Bowen and his family. Jet Aparicio, AMARI, Supervisee in Social Work Palliative Medicine  [phone]

## 2019-10-09 NOTE — PROGRESS NOTES
NATHAN PLan: 
 
* Palliative Care meeting planned for 10/10 at 10 AM 
* [facility]ist working towards [facility] * [facility] successfully processed * Patient will need LTC placement UAI was returned successfully processed. Spoke with Eloy Navarro [phone] who confirmed that he and Solo Cerda will be at the hospital tomorrow at 10 to meet with Palliative care. CM will plan to attend this meeting. Will determine if patient's sister Solo Cerda or Brooks Gar is willing to sign patient into a nursing home for LTC. Family has provided bank statements for [employer] in effort to obtain Medicaid for patient. LETA CrooksW

## 2019-10-09 NOTE — PROGRESS NOTES
Hospitalist Progress Note NAME: Crissy Sloan :  1950 MRN:  [id number] Room Number:  772/75  @ [facility] Summary: 78 y.o. male whom presented on 2019 with Assessment / Plan: 
 
Dementia, new diagnosis :  
Collateral history and clinical progression highly suggestive of dementia. MRI brain-No acute intracranial process seen. 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 Old left occipital infarct. EEG shows slowing and delta waves suggestive of cerebral disturbance, encephalopathy versus neurocognitive disorder. RPR negative, B12 wnl, TSH elevated. He does not have medical decision making capacity.  
 
-Started donepezil 
-Neurology input appreciated, started on thiamine daily 
-treat hypothyroidism 
-Family meeting with Palliative, primary team, patient's 2 sisters, 1 brother, 1 nephew arranged for 10 am tomorrow. New onset Systolic Congestive Heart Failure, EF~20% ECHO -echo shows extremely poor LV function, ejection fraction well under 20% with a spherical mass in the LV apex ,Possible long-standing granulomatous disease. Cardiology has signed off for now. - Strict Is and Os, 2 g Sodium cardiac diet, daily weights. 
- Started on coreg, aldactone and Entresto by cards. Conservative medical management.  
 
 
  
  
Dyspnea on exertion d/t HFrEF & left pleural effusion 
 
-Hold off diuresis and thoracentesis for now. -Treat underlying anemia.  
 
  
 
Hypothyroidism POA 
TSH elevated, Free T4 borderline low Start low dose levothyroxine, careful uptitration in 4-6 weeks.  
  
 
Elevated lactic acid POA : -resolved with gentle hydration  
  
Elevated troponin POA: 
EKG shows sinus arrhythmia, T wave inversion in V5-V6. Troponin 0.07. Likely d/t congestive heart failure.  
  
Microcytic hypochromic anemia : 
likely iron def - c/w iron tabs 
 
  
Code Status:  FULL Surrogate Decision Maker: sisterAdrianna  [phone] Desean Martin [phone] 
  
DVT Prophylaxis: Hep SQ and SCD's 
GI Prophylaxis: not indicated 
  
Baseline:  unknown Subjective: Chief Complaint / Reason for Physician Visit \"I think I am close to home but don't know where I am. I feel well\". Discussed with RN events overnight. Review of Systems: No fevers, chills, appetite change, cough, sputum production, nausea, vomitting, diarrhea, constipation, chest pain, leg edema, abdominal pain, joint pain, rash, itching. Tolerating PT/OT. Tolerating diet. Objective: VITALS:  
Last 24hrs VS reviewed since prior progress note. Most recent are: 
Patient Vitals for the past 24 hrs: 
 Temp Pulse Resp BP SpO2  
10/09/19 0744 97.5 °F (36.4 °C) 70 16 138/89 100 % 10/09/19 0415 97.4 °F (36.3 °C) 77 16 120/88 100 % 10/09/19 0054 97.7 °F (36.5 °C) 78 14 (!) 120/96 100 % 10/08/19 2018 97.6 °F (36.4 °C) 81 14 98/67 99 % No intake or output data in the 24 hours ending 10/09/19 1414 PHYSICAL EXAM: 
General: Alert, cooperative, no acute distress EENT:  EOMI. Anicteric sclerae. MMM Resp:  CTA bilaterally, no wheezing or rales. No accessory muscle use CV:  Regular  rhythm,  normal S1/S2, no murmurs rubs gallops, 2+ edema GI:  Soft, Non distended, Non tender. +Bowel sounds Neurologic:  Alert and oriented X self, normal speech, Psych:   Not anxious nor agitated Skin:  No rashes. No jaundice Reviewed most current lab test results and cultures  YES Reviewed most current radiology test results   YES Review and summation of old records today    NO Reviewed patient's current orders and MAR    YES 
PMH/SH reviewed - no change compared to H&P 
________________________________________________________________________ Care Plan discussed with: 
  Comments Patient  x Family RN  x Care Manager  x Consultant   x                   Multidiciplinary team rounds were held today with case manager, nursing, pharmacist and clinical coordinator. Patient's plan of care was discussed; medications were reviewed and discharge planning was addressed. ________________________________________________________________________ Total NON critical care TIME:  20  Minutes Total CRITICAL CARE TIME Spent:   Minutes non procedure based Comments >50% of visit spent in counseling and coordination of care    
________________________________________________________________________ Laura Mao MD  
 
Procedures: see electronic medical records for all procedures/Xrays and details which were not copied into this note but were reviewed prior to creation of Plan. LABS: 
I reviewed today's most current labs and imaging studies. Pertinent labs include: 
Recent Labs 10/09/19 
0425 10/07/19 
0445 WBC 4.2 4.3 HGB 11.1* 10.5* HCT 36.8 34.3*  
 269 Recent Labs 10/09/19 
0425 10/07/19 
0445 * 134* K 4.8 4.4  
 103 CO2 22 21 * 120* BUN 13 14 CREA 0.91 0.95  
CA 8.9 8.3*  
MG 2.1  --   
 
 
Signed: Laura Mao MD

## 2019-10-09 NOTE — CONSULTS
Palliative Medicine Consult [place]: [id number] ([id number]) Patient Name: Nilda Cullen YOB: 1950 Date of Initial Consult: 10/9/19 Reason for Consult: Care decisions Requesting Provider: Chris Ledbetter Primary Care Physician: None SUMMARY:  
Nilda Cullen is a 78 y.o. with a past history of etoh use, recent loss of vision in L eye who was admitted on 9/30/2019 from home after sister Miguel Blackburn called police because for the past several months has not been eating well, caring for himself, more confused. Has been refusing medical care. Upon admission found to have new onset systolic CHF (echo 56/8/48 w/ EF <15%, diffuse hypokinesis of LV ventricle as well as thrombus/mass of L ventricle). Medications adjusted. Neuro, psych, cardiology following closely. 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 On Aricept and thiamine. EEG w/ slowing. Based on hx w/ probably ongoing dementia rather than a reversible form of confusion. Current medical issues leading to Palliative Medicine involvement include: care decisions. Pt has 4 siblings Olivia Dela Cruz, Tavares Marquis and sister Nadja Che who had a TBI)  who are NOK, on 10/3/19 2 of the 4 told attending they wished for DNR status, but as not the majority full code status remains. Lexie Baltazar and his wife Luke Maxwell very involved. Medicaid application submitted. PALLIATIVE DIAGNOSES:  
1. Confusion, dementia 2. Anxiety , agitation 3. Poor appetite 4. Debility, poor balance 5. SOB, new dx of CHF PLAN:  
1. Along w/ Kelly Yomi GILLETTEW and Helio Burks care management meet w/ pt (who can answer simple questions, but quite lethargic and does not know situation- does not have capacity to make complex medical decisions in my medical opinion and as documented by other clinicians) and family- sister Solo Cerda, brother Juma Moya and his wife Miguel Blackburn (on speaker phone), and nephew Felicia Carlisle and his wife Luke Maxwell (who is a long time nurse.) 2. Pt has been declining over past few years actually - more confused. Over past few months has been not eating, not caring for himself, more SOB- his sister Arsenio Briggs was most involved, but recently stepped back and defers care and decisions to others. We learn that Adrianna and pt never went to the doctor. Pt was very active- had various jobs working in [employer], etc and loved parties and socializing. 3. Go over medical condition in detail- talk about ruling out any reversible causes of confusion, not finding any- and thus pt w/ progressive dementia. Also w/ end stage CHF- uncertain how long as been functioning w/ low EF, but talk about it in detail. Other organs to be affected w/ time as well. 4. Given pt's frail state and mult comorbidities, he is not a candidate for more invasive interventions and nor do we think anything would change his condition or life expectancy. Family grateful for all care and that has had thorough work up. 5. With his decline in function, all that we are saying is not a surprise to family although w/ new diagnoses. 6. Talk about what pt would want if he were fully able to understand situation. Pt was always so active, that they all agree immediately that he would not count this as quality of life. 7. Discuss treating sx, knowing that pt w/ progressive serious illness, and that life expectancy is limited. 6. Family agrees to allow natural death when the time comes- DDNR completed. They also agree to treat sx as they arise, but not put him through any aggressive interventions. If able, they would like pt tx to [facility]. Care management involved, appreciate. Medicaid application being submitted. 9. Pastoral care supporting. 10. Plan: 11. For now shift to comfort measures while inpatient here at [facility]. 12. I will continue all medications right now for sx management as he is swallowing okay, can have prn Lasix as well.  Family well aware that swallowing will be more difficult w/ time (already eating very little) and then will scale back medications or even stop all pills completely. 13. To have hospitalist Dr Anton Faust place DNR order in [facility] ( I did pink code form and DDNR)- as I cannot place orders at [facility] at this time  (? IT team notified). 14. Recommend: 1. Morphine concentrate 10mg SL every 1h prn 2. Ativan SL 2mg SL every 1h prn 3. Lasix prn 15. Currently w/out sx. Can also use comfort care order set to help guide. 16. Hospice consult not placed as does not meet IP criteria. 17.  Initial consult note routed to primary continuity provider and/or primary health care team members 18. Communicated plan of care with: Palliative IDTYoan 192 Team- [facility] [address] management, Dr Blessing Barron RN, [facility] GOALS OF CARE / TREATMENT PREFERENCES:  
 
GOALS OF CARE: 
Patient/Health Care Proxy Stated Goals: Comfort TREATMENT PREFERENCES:  
Code Status: Full Code (once Dr Anton Faust can change orders will be DNR) Advance Care Planning: 
[] [facility] Interdisciplinary Team has updated the ACP Navigator with [place] and Patient Capacity Supplemental (Other) Decision Maker: Vishal Valdovinos Sister - [phone] Supplemental (Other) Decision Maker: Delfina Tatumoks - [facility] - [phone] Supplemental (Other) Decision Maker: Kamlesh Chakraborty  Brother - [phone] Advance Care Planning 10/5/2019 Confirm Advance Directive None Medical Interventions: Limited additional interventions Other: As far as possible, the palliative care team has discussed with patient / health care proxy about goals of care / treatment preferences for patient. HISTORY:  
 
History obtained from: Pt, family , chart, staff CHIEF COMPLAINT: \"Okay\" HPI/SUBJECTIVE: The patient is:  
[x] Verbal and participatory [] Non-participatory due to: Pt denies pain, SOB. C/o \"heavy legs\". Not hungry. Not able to reposition himself in bed. Recognizes family and smiles at them. Shakes my hand and says \"that's cold\". Clinical Pain Assessment (nonverbal scale for severity on nonverbal patients):  
Clinical Pain Assessment Severity: 0 Duration: for how long has pt been experiencing pain (e.g., 2 days, 1 month, years) Frequency: how often pain is an issue (e.g., several times per day, once every few days, constant) FUNCTIONAL ASSESSMENT:  
 
Palliative Performance Scale (PPS): PPS: 30 
 
 
 PSYCHOSOCIAL/SPIRITUAL SCREENING:  
 
Palliative IDT has assessed this patient for cultural preferences / practices and a referral made as appropriate to needs (Cultural Services, Patient Advocacy, Ethics, etc.) Any spiritual / Yazdanism concerns: 
[] Yes /  [x] No 
 
Caregiver Burnout: 
[] Yes /  [x] No /  [] No Caregiver Present Anticipatory grief assessment:  
[x] Normal  / [] Maladaptive ESAS Anxiety: Anxiety: 0 
 
ESAS Depression: Depression: 0 REVIEW OF SYSTEMS:  
 
Positive and pertinent negative findings in ROS are noted above in HPI. The following systems were [x] reviewed / [] unable to be reviewed as noted in HPI Other findings are noted below. Systems: constitutional, ears/nose/mouth/throat, respiratory, gastrointestinal, genitourinary, musculoskeletal, integumentary, neurologic, psychiatric, endocrine. Positive findings noted below. Modified ESAS Completed by: provider Fatigue: 8 Drowsiness: 3 Depression: 0 Pain: 0 Anxiety: 0 Nausea: 0 Anorexia: 8 Dyspnea: 2 Stool Occurrence(s): 1 PHYSICAL EXAM:  
 
From RN flowsheet: 
Wt Readings from Last 3 Encounters:  
10/08/19 148 lb 12.8 oz (67.5 kg) 10/04/19 140 lb 14 oz (63.9 kg) Blood pressure 103/66, pulse 71, temperature 97.4 °F (36.3 °C), resp. rate 18, height 5' 5\" (1.651 m), weight 148 lb 12.8 oz (67.5 kg), SpO2 99 %. Pain Scale 1: PAINAD (Advanced Dementia) Pain Intensity 1: 0 Last bowel movement, if known:  
 
Constitutional: lethargic but wakes up, alert to person and family Eyes: pupils equal, anicteric ENMT: no nasal discharge, moist mucous membranes Cardiovascular: regular rhythm, trace LE edema Respiratory: breathing not labored decr breath sounds L>R anteriorly Gastrointestinal: soft non-tender, +bowel sounds Musculoskeletal: no deformity, no tenderness to palpation Skin: warm, dry Neurologic: following some commands Psych: Calm HISTORY:  
 
Active Problems: 
  Altered mental state (9/30/2019) Heart failure ([facility] (10/1/2019) History reviewed. No pertinent past medical history. History reviewed. No pertinent surgical history. History reviewed. No pertinent family history. History reviewed, no pertinent family history. Social History Tobacco Use  Smoking status: Former Smoker  Tobacco comment: quit 4 years ago Substance Use Topics  Alcohol use: Not Currently Comment: quit 3 years ago No Known Allergies Current Facility-Administered Medications Medication Dose Route Frequency  carvedilol (COREG) tablet 6.25 mg  6.25 mg Oral BID  lactobac ac& pc-s.therm-b.anim (CALIXTO Q/RISAQUAD)  1 Cap Oral DAILY  thiamine HCL (B-1) tablet 100 mg  100 mg Oral DAILY  haloperidol lactate (HALDOL) injection 2 mg  2 mg IntraMUSCular Q8H PRN  
 haloperidol (HALDOL) tablet 2 mg  2 mg Oral Q8H PRN  
 donepezil (ARICEPT) tablet 5 mg  5 mg Oral QHS  loperamide (IMODIUM) capsule 2 mg  2 mg Oral Q4H PRN  
 spironolactone (ALDACTONE) tablet 50 mg  50 mg Oral DAILY  ferrous sulfate tablet 325 mg  1 Tab Oral BID WITH MEALS  
 levothyroxine (SYNTHROID) tablet 25 mcg  25 mcg Oral 6am  
 sacubitril-valsartan (ENTRESTO) 24-26 mg tablet 1 Tab  1 Tab Oral Q12H  
 sodium chloride (NS) flush 5-40 mL  5-40 mL IntraVENous Q8H  
  sodium chloride (NS) flush 5-40 mL  5-40 mL IntraVENous PRN  
 heparin (porcine) injection 5,000 Units  5,000 Units SubCUTAneous Q8H  
 acetaminophen (TYLENOL) tablet 650 mg  650 mg Oral Q4H PRN  
 ondansetron (ZOFRAN ODT) tablet 4 mg  4 mg Oral Q4H PRN  
 
 
 
 LAB AND IMAGING FINDINGS:  
 
Lab Results Component Value Date/Time WBC 4.2 10/09/2019 04:25 AM  
 HGB 11.1 (L) 10/09/2019 04:25 AM  
 PLATELET 364 62/67/8910 04:25 AM  
 
Lab Results Component Value Date/Time Sodium 134 (L) 10/09/2019 04:25 AM  
 Potassium 4.8 10/09/2019 04:25 AM  
 Chloride 102 10/09/2019 04:25 AM  
 CO2 22 10/09/2019 04:25 AM  
 BUN 13 10/09/2019 04:25 AM  
 Creatinine 0.91 10/09/2019 04:25 AM  
 Calcium 8.9 10/09/2019 04:25 AM  
 Magnesium 2.1 10/09/2019 04:25 AM  
 Phosphorus 2.9 10/02/2019 04:25 AM  
  
Lab Results Component Value Date/Time AST (SGOT) 56 (H) 09/30/2019 04:19 PM  
 Alk. phosphatase 97 09/30/2019 04:19 PM  
 Protein, total 6.8 09/30/2019 04:19 PM  
 Albumin 3.1 (L) 09/30/2019 04:19 PM  
 Globulin 3.7 09/30/2019 04:19 PM  
 
Lab Results Component Value Date/Time INR 1.4 (H) 09/30/2019 04:19 PM  
 Prothrombin time 14.3 (H) 09/30/2019 04:19 PM  
 aPTT 28.9 09/30/2019 04:19 PM  
  
No results found for: IRON, FE, TIBC, IBCT, PSAT, FERR No results found for: PH, PCO2, PO2 No components found for: [place] No results found for: CPK, CKMB Total time: 90 min Counseling / coordination time, spent as noted above: 70 min  
> 50% counseling / coordination?: yes Prolonged service was provided for  []30 min   []75 min in face to face time in the presence of the patient, spent as noted above. Time Start:  
Time End:  
Note: this can only be billed with 50117 (initial) or 85920 (follow up). If multiple start / stop times, list each separately.

## 2019-10-09 NOTE — PROGRESS NOTES
Problem: Self Care Deficits Care Plan (Adult) Goal: *Acute Goals and Plan of Care (Insert Text) Description FUNCTIONAL STATUS PRIOR TO ADMISSION: Patient is poor historian due to confusion. Based on chart and discussion with staff, Patient was independent and active without use of DME. Patient was independent for basic ADLs. He reports his sister comes in to check on him and brings him food. HOME SUPPORT: The patient lived alone with sister to provide assistance. Occupational Therapy Goals Initiated 10/2/2019; goals reviewed 10/9/2019 all goals remain appropriate 1. Patient will perform self-feeding with modified independence within 7 day(s). 2.  Patient will perform grooming, standing at sink, with modified independence within 7 day(s). 3.  Patient will perform lower body dressing with supervision/set-up within 7 day(s). 4.  Patient will perform toilet transfers with supervision/set-up within 7 day(s). 5.  Patient will perform all aspects of toileting with supervision/set-up within 7 day(s). Outcome: Progressing Towards Goal 
  
OCCUPATIONAL THERAPY TREATMENT/WEEKLY RE-ASSESSMENT Patient: Renuka Adams (74 y.o. male) Date: 10/9/2019 Diagnosis: Altered mental state [R41.82] [facility] [I50.9] <principal problem not specified> Precautions:  Fall Chart, occupational therapy assessment, plan of care, and goals were reviewed. ASSESSMENT Patient continues with skilled OT services and is making minimal progress towards goals. He remains limited by confusion and poor safety awareness as well as impaired balance. He requires increased time and encouragement during all activities and min cues for sequencing. In bathroom, he is noted to have increased instability, requiring up to min A to steady after LOB. Provided HHA this session during mobility. Offered standing ADLs at sink but patient declined further ADLs, requesting to return to supine. Patient continues to be unsafe to be home without 24 hour assistance/supervision. Per chart, family meeting planned to discuss goals of care. No goals met at this time. Will continue per POC to address deficits impacting safety and independence. Current Level of Function Impacting Discharge (ADLs): overall min A for ADLs for safety Other factors to consider for discharge: poor safety awareness, confusion; lives alone PLAN : 
Goals have been updated based on progression since last assessment. Patient continues to benefit from skilled intervention to address the above impairments. Continue to follow patient 1 time a week to address goals. Recommend with staff: OOB to chair (with alarm) for meals; mobility to bathroom for toileting with 1 person assist 
 
Recommend next OT session: Standing ADLs at sink for standing tolerance Recommendation for discharge: (in order for the patient to meet his/her long term goals) To be determined: will require at least 24 hour assist/supervision at this time for safety due to cognitive and mobility deficits This discharge recommendation: 
Has not yet been discussed the attending provider and/or case management Equipment recommendations for successful discharge (if) home: TBD SUBJECTIVE:  
Patient stated i'm tired.  RN cleared patient for therapy. Patient agreeable to participate. OBJECTIVE DATA SUMMARY:  
Cognitive/Behavioral Status: 
Neurologic State: Alert;Confused Orientation Level: Oriented to person;Disoriented to place; Disoriented to situation;Disoriented to time Cognition: Decreased attention/concentration; Follows commands Perception: Appears intact Perseveration: No perseveration noted Safety/Judgement: Decreased awareness of need for assistance;Decreased awareness of need for safety;Decreased insight into deficits Functional Mobility and Transfers for ADLs: 
Bed Mobility: 
Rolling: Modified independent Supine to Sit: Stand-by assistance Sit to Supine: Contact guard assistance Transfers: 
Sit to Stand: Contact guard assistance;Assist x1 Functional Transfers Bathroom Mobility: Contact guard assistance;Minimum assistance(1 instance of LOB; noted to reach outside ROLANDA for grab bars) Toilet Transfer : (pt declined) Balance: 
Sitting: Intact Standing: Impaired Standing - Static: Good Standing - Dynamic : Fair ADL Intervention: Lower Body Dressing Assistance Socks: Supervision(able to tailor sit; increased time) Leg Crossed Method Used: Yes Position Performed: Seated edge of bed Cues: Doff;Verbal cues provided; Tactile cues provided Cognitive Retraining Safety/Judgement: Decreased awareness of need for assistance;Decreased awareness of need for safety;Decreased insight into deficits He remains limited by confusion and poor safety awareness as well as impaired balance. He requires increased time and encouragement during all activities and min cues for sequencing. In bathroom, he is noted to have increased instability, requiring up to min A to steady after LOB. Provided HHA this session during mobility due to instability. Offered standing ADLs at sink but patient declined further ADLs, requesting to return to supine. Pain: 
0/10 Activity Tolerance:  
Fair, SpO2 stable on RA and denied feeling dizzy throughout session. Please refer to the flowsheet for vital signs taken during this treatment. After treatment patient left in no apparent distress:  
Supine in bed, Call bell within reach, Bed / chair alarm activated, Side rails x 3 and RN notified COMMUNICATION/COLLABORATION:  
The patients plan of care was discussed with: Physical Therapist and Registered Nurse Rommel Recio OT Time Calculation: 12 mins

## 2019-10-09 NOTE — PROGRESS NOTES
Problem: Pressure Injury - Risk of 
Goal: *Prevention of pressure injury Description Document Yosvany Scale and appropriate interventions in the flowsheet. Outcome: Progressing Towards Goal 
Note:  
Pressure Injury Interventions: 
Sensory Interventions: Check visual cues for pain, Keep linens dry and wrinkle-free, Minimize linen layers, Maintain/enhance activity level, Monitor skin under medical devices, Turn and reposition approx. every two hours (pillows and wedges if needed) Moisture Interventions: Absorbent underpads, Check for incontinence Q2 hours and as needed, Contain wound drainage, Maintain skin hydration (lotion/cream), Minimize layers Activity Interventions: Increase time out of bed, Assess need for specialty bed Mobility Interventions: Assess need for specialty bed, HOB 30 degrees or less, Turn and reposition approx. every two hours(pillow and wedges) Nutrition Interventions: Document food/fluid/supplement intake Friction and Shear Interventions: HOB 30 degrees or less, Foam dressings/transparent film/skin sealants, Apply protective barrier, creams and emollients, Minimize layers Problem: Patient Education: Go to Patient Education Activity Goal: Patient/Family Education Outcome: Progressing Towards Goal 
  
Problem: Falls - Risk of 
Goal: *Absence of Falls Description Document Vickii Bridges Fall Risk and appropriate interventions in the flowsheet. Outcome: Progressing Towards Goal 
Note:  
Fall Risk Interventions: 
Mobility Interventions: Bed/chair exit alarm, Communicate number of staff needed for ambulation/transfer, Patient to call before getting OOB Mentation Interventions: Adequate sleep, hydration, pain control, Bed/chair exit alarm, Door open when patient unattended, More frequent rounding, Reorient patient, Room close to nurse's station, Toileting rounds Medication Interventions: Bed/chair exit alarm, Evaluate medications/consider consulting pharmacy, Patient to call before getting OOB, Teach patient to arise slowly Elimination Interventions: Bed/chair exit alarm, Call light in reach, Patient to call for help with toileting needs, Toileting schedule/hourly rounds, Urinal in reach Problem: Patient Education: Go to Patient Education Activity Goal: Patient/Family Education Outcome: Progressing Towards Goal 
  
Problem: Cardiac Output -  Decreased Goal: *Vital signs within specified parameters Outcome: Progressing Towards Goal 
Goal: *Absence of hypoxia Outcome: Progressing Towards Goal 
Goal: *Intravascular fluid volume and electrolyte balance Outcome: Progressing Towards Goal 
  
Problem: Cardiac Output -  Decreased Goal: *Optimal cardiac output Outcome: Not Progressing Towards Goal 
Goal: *Absence of peripheral edema Outcome: Not Progressing Towards Goal

## 2019-10-09 NOTE — PROGRESS NOTES
Writer informed MD- Bill Bazzi of patients inadequate food intake. MD stated she would put in order for nutritional supplement. Patient noted with blanchable redness to coccyx, writer assisted patient out of bed to sit up for meals, encouragement needed.

## 2019-10-10 NOTE — PROGRESS NOTES
Palliative Medicine [place]: [phone] ([phone]) [facility]: [phone]) Code Status: DNR Advance Care Planning: 
Advance Care Planning 10/5/2019 Confirm Advance Directive None Zena Garza Supplemental (Other) Decision Maker: Raven Cardenas Sister - [phone] Supplemental (Other) Decision Maker: Homero Szymanski - Sister - [phone] Supplemental (Other) Decision Maker: Day Benitezt - Brother - [phone] Sister [address] Ethan unable to participate in patient care due to traumatic brain injury. Family agreed to comfort/hospice with shift to LTC bed with medicaid. DDNR signed by Pk Goodwin. Longer note to follow. Thank you for the opportunity to be involved in the care of Mr. Valentin Dozier and his family. Alexander Looney LMSW, Supervisee in Social Work Palliative Medicine  [phone]

## 2019-10-10 NOTE — PROGRESS NOTES
Problem: Pressure Injury - Risk of 
Goal: *Prevention of pressure injury Description Document Yosvany Scale and appropriate interventions in the flowsheet. Outcome: Progressing Towards Goal 
Note:  
Pressure Injury Interventions: 
Sensory Interventions: Assess changes in LOC, Minimize linen layers, Turn and reposition approx. every two hours (pillows and wedges if needed) Moisture Interventions: Absorbent underpads, Minimize layers, Offer toileting Q_hr Activity Interventions: Increase time out of bed Mobility Interventions: HOB 30 degrees or less, Turn and reposition approx. every two hours(pillow and wedges) Nutrition Interventions: Document food/fluid/supplement intake Friction and Shear Interventions: HOB 30 degrees or less Problem: Patient Education: Go to Patient Education Activity Goal: Patient/Family Education Outcome: Progressing Towards Goal 
  
Problem: Falls - Risk of 
Goal: *Absence of Falls Description Document Danielle Alvarado Fall Risk and appropriate interventions in the flowsheet. Outcome: Progressing Towards Goal 
Note:  
Fall Risk Interventions: 
Mobility Interventions: Bed/chair exit alarm, Utilize walker, cane, or other assistive device Mentation Interventions: Bed/chair exit alarm, Door open when patient unattended, Room close to nurse's station, Toileting rounds Medication Interventions: Bed/chair exit alarm Elimination Interventions: Bed/chair exit alarm, Call light in reach, Toileting schedule/hourly rounds Problem: Patient Education: Go to Patient Education Activity Goal: Patient/Family Education Outcome: Progressing Towards Goal 
  
Problem: Patient Education: Go to Patient Education Activity Goal: Patient/Family Education Outcome: Progressing Towards Goal 
  
Problem: Patient Education: Go to Patient Education Activity Goal: Patient/Family Education Outcome: Progressing Towards Goal 
 Care Plan Reviewed Problem: Cardiac Output -  Decreased Goal: *Vital signs within specified parameters Outcome: Progressing Towards Goal 
Goal: *Optimal cardiac output Outcome: Progressing Towards Goal 
Goal: *Absence of hypoxia Outcome: Progressing Towards Goal 
Goal: *Absence of peripheral edema Outcome: Progressing Towards Goal 
Goal: *Intravascular fluid volume and electrolyte balance Outcome: Progressing Towards Goal

## 2019-10-10 NOTE — PROGRESS NOTES
Follow-up visit was encouraged by Palliative Care team members. The patient was unable to participate. The patient's sister and other (3) close relatives were present during the visit. The family is composed of two pastors. The conversation was wide-ranging and lengthily.  offered prayer for the patient and family. Rev. Angela Parra EdD MDiv  For [facility] Page [phone])

## 2019-10-10 NOTE — ACP (ADVANCE CARE PLANNING)
Advanced Care Planning See note from today for more details. 2 of the 3 siblings [facility]) who are able to make decisions (4th sibling is in NH after significant TBI) made decision for comfort measures, DNR status. This included sister Justine Lewis and brother James Guzmán. Supplemental (Other) Decision Maker: Belkis Alba Sister - [phone] Supplemental (Other) Decision Maker: Sherri Mukherjee - Sister - [phone] Supplemental (Other) Decision Maker: Alannah Olguin - Brother - [phone]

## 2019-10-10 NOTE — PROGRESS NOTES
Hospitalist Progress Note NAME: Kirsten Tony :  1950 MRN:  [id number] Room Number:  006/55  @ [facility] Summary: 78 y.o. male whom presented on 2019 with Assessment / Plan: 
 
Comfort measures only Goals of care discussion Family meeting held today- Palliative Medicine physician Dr. Carroll Davies LCSW and Sara Cummings care management. Family members present - family- sister Cain Roberts, brother Verner Kung and his wife Arsenio Briggs (on speaker phone), and nephew Adore Marcelo and his wife Daniel Page (who is a long time nurse. ). Other sister Chikis Sun has deferred decision making to other family members. Patient was transitioned to [address] [place] [address]. Family preference is [facility]. - Lorazepam 2 mg SL PRN for anxiety. - Morphine 10 mg PRN for pain/shortness of breath - Lasix 40 mg oral PRN for shortness of breath 
- Oxygen for comfort as needed - Regular diet 
- Hold off thoracentesis as patient is breathing comfortably. Dementia, new diagnosis :  
Collateral history and clinical progression highly suggestive of dementia. MRI brain-No acute intracranial process seen. 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 Old left occipital infarct. EEG shows slowing and delta waves suggestive of cerebral disturbance, encephalopathy versus neurocognitive disorder. RPR negative, B12 wnl, TSH elevated. He does not have medical decision making capacity. Was started on Donepezil and thiamine, levoythyroxine. After family meeting, transitioned to [address] [place] [address].   
 
New onset Systolic Congestive Heart Failure, EF~20% Dyspnea on exertion d/t HFrEF & left pleural effusionECHO -echo shows extremely poor LV function, ejection fraction well under 20% with a spherical mass in the LV apex ,Possible long-standing granulomatous disease. Started on coreg, aldactone and Entresto by cards. Transitioned to [address] [place] [address] PRN Lasix for comfort. Hypothyroidism POA TSH elevated, Free T4 borderline low. Discontinued as patient CMO. Elevated lactic acid POA : -resolved with gentle hydration Elevated troponin POA: 
EKG shows sinus arrhythmia, T wave inversion in V5-V6. Troponin 0.07. Likely d/t congestive heart failure. Microcytic hypochromic anemia : 
likely iron deficiency.  
  
Code Status:  FULL Surrogate Decision Maker:  
Supplemental (Other) Decision Maker: Jt Whitfield - Sister - [phone] Supplemental (Other) Decision Maker: Tae Dennison - Sister - [phone] Supplemental (Other) Decision Maker: Americo Holland - Brother - [phone] DVT Prophylaxis: Hep SQ and SCD's 
GI Prophylaxis: not indicated 
  
Baseline:  unknown Subjective: Chief Complaint / Reason for Physician Visit Patient is pleasantly confused. Poor appetite for the last 2 days. Discussed with RN events overnight. Review of Systems: No fevers, chills, appetite change, cough, sputum production, shortness of breath, dyspnea on exertion, nausea, vomitting, diarrhea, constipation, chest pain, leg edema, abdominal pain, joint pain, rash, itching. Objective: VITALS:  
Last 24hrs VS reviewed since prior progress note. Most recent are: 
Patient Vitals for the past 24 hrs: 
 Temp Pulse Resp BP SpO2  
10/10/19 0800 97.4 °F (36.3 °C) 71 18 103/66 99 % 10/09/19 2046     99 % 10/09/19 2044 97.9 °F (36.6 °C) 77 18 112/76 99 % 10/09/19 1626 97.1 °F (36.2 °C) 73 16 114/83 100 % Intake/Output Summary (Last 24 hours) at 10/10/2019 1232 Last data filed at 10/10/2019 0800 Gross per 24 hour Intake 100 ml Output  Net 100 ml PHYSICAL EXAM: 
General: Alert, cooperative, no acute distress EENT:  EOMI. Anicteric sclerae. MMM Resp:  CTA bilaterally, no wheezing or rales. No accessory muscle use CV:  Regular  rhythm,  normal S1/S2, no murmurs rubs gallops, No edema GI:  Soft, Non distended, Non tender. +Bowel sounds Neurologic:  Alert and oriented to self, normal speech Psych:   Not anxious nor agitated Skin:  No rashes. No jaundice Reviewed most current lab test results and cultures  YES Reviewed most current radiology test results   YES Review and summation of old records today    NO Reviewed patient's current orders and MAR    YES 
PMH/SH reviewed - no change compared to H&P 
________________________________________________________________________ Care Plan discussed with: 
  Comments Patient  x Family   x  
RN  x Care Manager  x Consultant   x Multidiciplinary team rounds were held today with , nursing, pharmacist and clinical coordinator. Patient's plan of care was discussed; medications were reviewed and discharge planning was addressed. ________________________________________________________________________ Total NON critical care TIME: 20   Minutes Total CRITICAL CARE TIME Spent:   Minutes non procedure based Comments >50% of visit spent in counseling and coordination of care    
________________________________________________________________________ Eldon Corona MD  
 
Procedures: see electronic medical records for all procedures/Xrays and details which were not copied into this note but were reviewed prior to creation of Plan. LABS: 
I reviewed today's most current labs and imaging studies. Pertinent labs include: 
Recent Labs 10/09/19 
0425 WBC 4.2 HGB 11.1*  
HCT 36.8  Recent Labs 10/09/19 
0425 * K 4.8  
 CO2 22 * BUN 13  
CREA 0.91  
CA 8.9 MG 2.1 Signed: Eldon Corona MD

## 2019-10-11 NOTE — PROGRESS NOTES
Spiritual Care Partner Volunteer visited patient in Med Surg Unit at [facility] on 10/11/2019. Documented by: 
Augustine Luna, [facility], Spiritual Care Intern

## 2019-10-11 NOTE — PROGRESS NOTES
Hospitalist Progress Note NAME: Gabi Bear :  1950 MRN:  [id number] Room Number:  579/82  @ [facility] Summary: 78 y.o. male whom presented on 2019 with Assessment / Plan: 
Comfort measures only Goals of care discussion Family meeting held today- Palliative Medicine physician Dr. Juan Ramon Garcia LCSW and Venancio Rodriguez care management. Family members present - family- sister Rere Dickinson, brother Ac Flores and his wife Noemi Gibson (on speaker phone), and nephew Helen Dong and his wife Keira Baptiste (who is a long time nurse. ). Other sister Susan Wasserman has deferred decision making to other family members. Patient was transitioned to [address] [place] [address]. Family preference is [facility].  
  
- Lorazepam 2 mg SL PRN for anxiety. - Morphine 10 mg PRN for pain/shortness of breath - Lasix 40 mg oral PRN for shortness of breath 
- Oxygen for comfort as needed - Regular diet 
- Hold off thoracentesis as patient is breathing comfortably.  
  
 
Dementia, new diagnosis :  
Collateral history and clinical progression highly suggestive of dementia. MRI brain-No acute intracranial process seen. 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 Old left occipital infarct.  EEG shows slowing and delta waves suggestive of cerebral disturbance, encephalopathy versus neurocognitive disorder. RPR negative, B12 wnl, TSH elevated. He does not have medical decision making capacity. Was started on Donepezil and thiamine, levoythyroxine. After family meeting, transitioned to [address] [place] [address].   
  
New onset Systolic Congestive Heart Failure, EF~20% Dyspnea on exertion d/t HFrEF & left pleural effusionECHO -echo shows extremely poor LV function, ejection fraction well under 20% with a spherical mass in the LV apex ,Possible long-standing granulomatous disease. Started on coreg, aldactone and Entresto by cards. Transitioned to [address] [place] [address] PRN Lasix for comfort. Hypothyroidism POA TSH elevated, Free T4 borderline low. Discontinued as patient CMO. Elevated lactic acid POA : -resolved with gentle hydration Elevated troponin POA: 
EKG shows sinus arrhythmia, T wave inversion in V5-V6. Troponin 0.07. Likely d/t congestive heart failure. Microcytic hypochromic anemia : 
likely iron deficiency.  
  
Code Status:  FULL Surrogate Decision Maker:  
Supplemental (Other) Decision Maker: Jt Whitfield - Sister - [phone] Supplemental (Other) Decision Maker: Tae Dennison - Sister - [phone] Supplemental (Other) Decision Maker: Americo Holland - Brother - [phone] 
  
  
DVT Prophylaxis: Hep SQ and SCD's 
GI Prophylaxis: not indicated 
  
Baseline:  unknown Subjective: Chief Complaint / Reason for Physician Visit Patient is oriented to self. He tolerated breakfast well today. Discussed with RN events overnight. Review of Systems: No fevers, chills, appetite change, cough, sputum production, shortness of breath, dyspnea on exertion, nausea, vomitting, diarrhea, constipation, chest pain, leg edema, abdominal pain, joint pain, rash, itching. Tolerating diet. Objective: VITALS:  
Last 24hrs VS reviewed since prior progress note. Most recent are: 
Patient Vitals for the past 24 hrs: 
 Temp Pulse Resp BP SpO2  
10/10/19 1950 97.4 °F (36.3 °C) 77 16 108/76 100 % 10/10/19 1620 98.1 °F (36.7 °C) 70 18 98/60 96 % No intake or output data in the 24 hours ending 10/11/19 1321 PHYSICAL EXAM: 
General: WD, WN. Alert, cooperative, no acute distress EENT:  EOMI. Anicteric sclerae. MMM Resp:  CTA bilaterally, no wheezing or rales. No accessory muscle use CV:  Regular  rhythm,  normal S1/S2, no murmurs rubs gallops, No edema GI:  Soft, Non distended, Non tender. +Bowel sounds Neurologic:  Alert and oriented X self, normal speech, Psych:   Not anxious nor agitated Skin:  No rashes. No jaundice Reviewed most current lab test results and cultures  YES 
 Reviewed most current radiology test results   YES Review and summation of old records today    NO Reviewed patient's current orders and MAR    YES 
PMH/SH reviewed - no change compared to H&P 
________________________________________________________________________ Care Plan discussed with: 
  Comments Patient  x Family RN  x Care Manager  x Consultant   x Multidiciplinary team rounds were held today with , nursing, pharmacist and clinical coordinator. Patient's plan of care was discussed; medications were reviewed and discharge planning was addressed. ________________________________________________________________________ Total NON critical care TIME:  15   Minutes Total CRITICAL CARE TIME Spent:   Minutes non procedure based Comments >50% of visit spent in counseling and coordination of care    
________________________________________________________________________ Giacomo Max MD  
 
Procedures: see electronic medical records for all procedures/Xrays and details which were not copied into this note but were reviewed prior to creation of Plan. LABS: 
I reviewed today's most current labs and imaging studies. Pertinent labs include: 
Recent Labs 10/09/19 
0425 WBC 4.2 HGB 11.1*  
HCT 36.8  Recent Labs 10/09/19 
0425 * K 4.8  
 CO2 22 * BUN 13  
CREA 0.91  
CA 8.9 MG 2.1 Signed: Giacomo Max MD

## 2019-10-11 NOTE — CONSULTS
PSYCHIATRY CONSULT NOTE: 
 
REASON FOR CONSULT: 
Dementia HISTORY OF PRESENTING COMPLAINT: 
Desmond Chaudhary is a 78 y.o. male admitted to medical for treatment of Congestive Heart Failure. He complains of being tired and gets short of breath easy. Suspicion of dementia from collaterals, however patient presents with a waxing and waning level of consciousness, seen by different providers. There is a chance of dementia being a diagnosis, however his delirium must first be cleared to establish baseline and whether this difference of cognition and alertness is permanent. 10/10 - Attempted to speak with patient again to determine if he has the capacity to make medical decisions for himself. He was unable to answer most questions and remains confused and delirious. However Pleasant without aggression PAST PSYCHIATRIC HISTORY: 
None SUBSTANCE ABUSE HISTORY: 
Social History Substance and Sexual Activity Drug Use Never Social History Substance and Sexual Activity Alcohol Use Not Currently Comment: quit 3 years ago Social History Tobacco Use Smoking Status Former Smoker Tobacco Comment  
 quit 4 years ago PAST MEDICAL HISTORY: 
History reviewed. No pertinent past medical history. SOCIAL HISTORY: 
See H&P for details VITALS: 
Visit Vitals /76 (BP 1 Location: Left arm, BP Patient Position: At rest) Pulse 77 Temp 97.4 °F (36.3 °C) Resp 16 Ht 5' 5\" (1.651 m) Wt 67.5 kg (148 lb 12.8 oz) SpO2 100% BMI 24.76 kg/m² MEDICATIONS: 
 
Current Facility-Administered Medications:  
  haloperidol (HALDOL) 2 mg/mL oral solution 2 mg, 2 mg, SubLINGual, Q6H PRN **OR** haloperidol lactate (HALDOL) injection 2 mg, 2 mg, IntraVENous, Q6H PRN, Marv Villanueva MD 
  scopolamine (TRANSDERM-SCOP) 1 mg over 3 days 1 Patch, 1 Patch, TransDERmal, Q72H PRN, Marv Villanueva MD 
  senna-docusate (PERICOLACE) 8.6-50 mg per tablet 2 Tab, 2 Tab, Oral, BID PRN, Angeles Blair MD 
  bisacodyl (DULCOLAX) suppository 10 mg, 10 mg, Rectal, DAILY PRN, Angeles Blair MD 
  morphine (ROXANOL) 100 mg/5 mL (20 mg/mL) concentrated solution 10 mg, 10 mg, SubLINGual, Q1H PRN, Angeles Blair MD 
  LORazepam (INTENSOL) 2 mg/mL oral concentrate 2 mg, 2 mg, SubLINGual, Q1H PRN, Angeles Blair MD 
  furosemide (LASIX) tablet 40 mg, 40 mg, Oral, DAILY PRN, Angeles Blair MD 
  lactobac ac& pc-s.therm-b.anim (CALIXTO Q/RISAQUAD), 1 Cap, Oral, DAILY, Felisha Sandhu MD, 1 Cap at 10/10/19 9817   loperamide (IMODIUM) capsule 2 mg, 2 mg, Oral, Q4H PRN, Felisha Sandhu MD, 2 mg at 10/09/19 0109 
  sodium chloride (NS) flush 5-40 mL, 5-40 mL, IntraVENous, PRN, Angeles Blair MD 
  acetaminophen (TYLENOL) tablet 650 mg, 650 mg, Oral, Q4H PRN, Angeles Blair MD 
  ondansetron (ZOFRAN ODT) tablet 4 mg, 4 mg, Oral, Q4H PRN, Angeles Blair MD 
 
MENTAL STATUS EXAM: 
Other; Resting, opens eyes and responds briefly getting changed from an incontinent episode. Disoriented Drowsy Goal directed and limited ASSESSMENT AND PLAN: 
Delirium due to multifactorial causes, Deferred , Problems with primary support group and Problems related to social environment  and 51-60 moderate symptoms RECOMMENDATIONS: 
Treat CHF and monitor delirium Reassess level of consciousness daily Donepezil may help with some cognitive impariment Neurology consult may be in order if delirium clears and confusion remains constant. Not a candidate for inpt psychiatric at this time 10/10 - Pt does not have the capacity to make medical or financial decisions for himself at this time. Thank you for this consultation Lilian Ocampo MD 
10/10/2019

## 2019-10-11 NOTE — PROGRESS NOTES
Problem: Mobility Impaired (Adult and Pediatric) Goal: *Acute Goals and Plan of Care (Insert Text) Description FUNCTIONAL STATUS PRIOR TO ADMISSION: Patient lived alone with sister helping as needed,without use of DME, HOME SUPPORT PRIOR TO ADMISSION: The patient lived alone with family to provide assistance. Physical Therapy Goals Initiated 10/1/2019; Reviewed 10/11/19: 
1. Patient will move from supine to sit and sit to supine , scoot up and down and roll side to side in bed with independence within 7 day(s). 2.  Patient will transfer from bed to chair and chair to bed with independence using the least restrictive device within 7 day(s). 3.  Patient will perform sit to stand with independence within 7 day(s). 4.  Patient will ambulate with modified independence for 300 feet with the least restrictive device within 7 day(s). Outcome: Progressing Towards Goal 
  
PHYSICAL THERAPY TREATMENT Weekly reassessment Patient: Olman Garcia (71 y.o. male) Date: 10/11/2019 Diagnosis: Altered mental state [R41.82] Heart failure ([facility] [address] [I50.9] <principal problem not specified> Precautions:   
Chart, physical therapy assessment, plan of care and goals were reviewed. ASSESSMENT Patient continues with skilled PT services and is progressing towards goals. Patient required contact guard assistance for supine to sit transfer this date. He reports increased fatigue this AM. Patient stood with contact guard assistance. Patient ambulated with decreased pace for 100 feet and contact guard assistance. Mild unsteadiness noted. Good participation. Other factors to consider for discharge: Impaired cognition and unsteadiness with mobility PLAN : 
Patient continues to benefit from skilled intervention to address the above impairments. Continue treatment per established plan of care. to address goals.  
 
Recommendation for discharge: (in order for the patient to meet his/her long term goals) Will require at least 24 hour assist/supervision at this time for safety due to cognitive and mobility deficits Equipment recommendations for successful discharge (if) home: TBD SUBJECTIVE:  
Patient stated I'm tired.  OBJECTIVE DATA SUMMARY:  
Critical Behavior: 
Neurologic State: Alert, Confused Orientation Level: Oriented to person, Oriented to place, Disoriented to time, Disoriented to situation Cognition: Follows commands, Impaired decision making, Decreased attention/concentration Safety/Judgement: Decreased awareness of need for assistance, Decreased awareness of need for safety, Decreased insight into deficits Functional Mobility Training: 
Bed Mobility: 
Supine to Sit: Contact guard assistance Sit to Supine: Stand-by assistance Transfers: 
Sit to Stand: Contact guard assistance; Additional time Stand to Sit: Contact guard assistance Balance: 
Sitting: Intact Standing: Impaired; Without support Standing - Static: Good Standing - Dynamic : Fair Ambulation/Gait Training: 
Distance (ft): 100 Feet (ft) Assistive Device: Gait belt Ambulation - Level of Assistance: Contact guard assistance Gait Abnormalities: Decreased step clearance;Trunk sway increased Base of Support: Narrowed Speed/Heather: Pace decreased (<100 feet/min) Step Length: Right shortened;Left shortened Pain Rating: 
No pain Activity Tolerance:  
Fair Please refer to the flowsheet for vital signs taken during this treatment. After treatment patient left in no apparent distress:  
Supine in bed, Call bell within reach, Bed / chair alarm activated and Side rails x 3 
 
COMMUNICATION/COLLABORATION:  
The patients plan of care was discussed with: Registered Nurse Nadira Gonzales, PT Time Calculation: 9 mins

## 2019-10-12 NOTE — PROGRESS NOTES
Bedside shift change report given to [address] (oncoming nurse) by Amara Reed (offgoing nurse). Report included the following information SBAR, Kardex, Intake/Output, MAR and Recent Results.

## 2019-10-12 NOTE — PROGRESS NOTES
Hospitalist Progress Note NAME: Alex Aguirre :  1950 MRN:  [id number] Room Number:  450/63  @ [facility] Summary: 78 y.o. male whom presented on 2019 with Assessment / Plan: 
Comfort measures only Goals of care discussion Family meeting held today- [facility] physician Dr. Conchis GILLETTEW and Kaylee Cespedes care management. Family members present - family- sister Zaynab Maravilla, brother Kasey Andrade and his wife Lucy Later (on speaker phone), and nephew Sb Hutson and his wife Mercedes Deshpande (who is a long time nurse. ). Other sister Sabina Arboleda has deferred decision making to other family members. Patient was transitioned to [address] [place] [address]. Family preference is [facility].  
  
- Lorazepam 2 mg SL PRN for anxiety. - Morphine 10 mg PRN for pain/shortness of breath - Lasix 40 mg oral PRN for shortness of breath 
- Oxygen for comfort as needed - Regular diet 
- Hold off thoracentesis as patient is breathing comfortably.  
  
  
Dementia, new diagnosis :  
Collateral history and clinical progression highly suggestive of dementia. MRI brain-No acute intracranial process seen. 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 Old left occipital infarct.  EEG shows slowing and delta waves suggestive of cerebral disturbance, encephalopathy versus neurocognitive disorder. RPR negative, B12 wnl, TSH elevated. He does not have medical decision making capacity. Was started on Donepezil and thiamine, levoythyroxine. After family meeting, transitioned to [address] [place] [address].   
  
  
New onset Systolic Congestive Heart Failure, EF~20% Dyspnea on exertion d/t HFrEF & left pleural effusionECHO -echo shows extremely poor LV function, ejection fraction well under 20% with a spherical mass in the LV apex ,Possible long-standing granulomatous disease. Started on coreg, aldactone and Entresto by cards. Transitioned to [address] [place] [address] PRN Lasix for comfort.  
Hypothyroidism POA TSH elevated, Free T4 borderline low. Discontinued as patient CMO. Elevated lactic acid POA : -resolved with gentle hydration Elevated troponin POA: 
EKG shows sinus arrhythmia, T wave inversion in V5-V6. Troponin 0.07. Likely d/t congestive heart failure. Microcytic hypochromic anemia : 
likely iron deficiency.  
  
Code Status:  FULL Surrogate Decision Maker:  
Supplemental (Other) Decision Maker: primary as per family consensus, Napoleon Meeks - Other Relative - [phone] Primary Decision Maker: Alejandro Chauhan - Sister - [phone] 
 
 
2 other siblings have decided to defer decision making to Baldomero Mckay and Dara Valdovinos Supplemental (Other) Decision Maker: Lea Schwab - Brother - [phone] Supplemental (Other) Decision Maker: Juwan Ingram Sister - [phone] Sister Meryl Bae has brain injury and unable to participate in [employer] for patient. 
  
  
DVT Prophylaxis: Hep SQ and SCD's 
GI Prophylaxis: not indicated 
  
Baseline:  unknown Subjective: Chief Complaint / Reason for Physician Visit \"I'm eating chocolate and orange ice cream for breakast\". Discussed with RN events overnight. Review of Systems: No fevers, chills, appetite change, cough, sputum production, shortness of breath, dyspnea on exertion, nausea, vomitting, diarrhea, constipation, chest pain, leg edema, abdominal pain, joint pain, rash, itching. Tolerating diet. Objective: VITALS:  
Last 24hrs VS reviewed since prior progress note. Most recent are: 
Patient Vitals for the past 24 hrs: 
 Temp Pulse Resp BP SpO2  
10/12/19 0720 97.9 °F (36.6 °C) 90 16 118/81 100 % 10/11/19 2009 97.3 °F (36.3 °C) 95 16 114/75 100 % Intake/Output Summary (Last 24 hours) at 10/12/2019 1000 Last data filed at 10/11/2019 1750 Gross per 24 hour Intake 540 ml Output  Net 540 ml PHYSICAL EXAM: 
General: WD, WN. Alert, cooperative, no acute distress EENT:  EOMI. Anicteric sclerae. MMM Resp: CTA bilaterally, no wheezing or rales. No accessory muscle use CV:  Regular  rhythm,  normal S1/S2, no murmurs rubs gallops, No edema GI:  Soft, Non distended, Non tender. +Bowel sounds Neurologic:  Alert and oriented X self, normal speech, moving all extremities spontaneously Psych:   Poor insight. Not anxious nor agitated Skin:  No rashes. No jaundice Reviewed most current lab test results and cultures  YES Reviewed most current radiology test results   YES Review and summation of old records today    NO Reviewed patient's current orders and MAR    YES 
PMH/SH reviewed - no change compared to H&P 
________________________________________________________________________ Care Plan discussed with: 
  Comments Patient  x Family RN  x Care Manager Consultant Multidiciplinary team rounds were held today with , nursing, pharmacist and clinical coordinator. Patient's plan of care was discussed; medications were reviewed and discharge planning was addressed. ________________________________________________________________________ Total NON critical care TIME:  15  Minutes Total CRITICAL CARE TIME Spent:   Minutes non procedure based Comments >50% of visit spent in counseling and coordination of care    
________________________________________________________________________ Alia Bower MD  
 
Procedures: see electronic medical records for all procedures/Xrays and details which were not copied into this note but were reviewed prior to creation of Plan. LABS: 
I reviewed today's most current labs and imaging studies. Pertinent labs include: No results for input(s): WBC, HGB, HCT, PLT, HGBEXT, HCTEXT, PLTEXT in the last 72 hours. No results for input(s): NA, K, CL, CO2, GLU, BUN, CREA, CA, MG, PHOS, ALB, TBIL, TBILI, SGOT, ALT, INR, INREXT in the last 72 hours.  
 
Signed: Alia Bower MD

## 2019-10-12 NOTE — PROGRESS NOTES
Repositioned patient, patient was sleeping and more difficult to arouse than normal, once patient was answering nurses questions, pt claimed he was very sleepy. Pt answered nurses questions appropriately however patient is disoriented which is baseline. Patient repositioned in bed. Will monitor

## 2019-10-13 NOTE — PROGRESS NOTES
Bedside shift change report given to [address] (oncoming nurse) by Pricila Callaway (offgoing nurse). Report included the following information SBAR, Kardex, Intake/Output, MAR and Recent Results.

## 2019-10-13 NOTE — PROGRESS NOTES
Hospitalist Progress Note NAME: Gil Bailey :  1950 MRN:  [id number] Room Number:  909/43  @ [facility] Summary: 78 y.o. male whom presented on 2019 with Assessment / Plan: 
No change in plan. Awaiting Medicaid application Comfort measures only Goals of care discussion Family meeting held today- [facility] physician Dr. Gearold Goldmann [facility] and Theadora Merlin care management. Family members present - family- sister Tj Poon, brother Tanmay Cervantes and his wife Lima Ewing (on speaker phone), and nephew Sheryl Anand and his wife Kasia Infante (who is a long time nurse. ). Other sister Rosanna Sahni has deferred decision making to other family members. Patient was transitioned to [address] [place] [address]. Family preference is [facility].  
  
- Lorazepam 2 mg SL PRN for anxiety. - Morphine 10 mg PRN for pain/shortness of breath - Lasix 40 mg oral PRN for shortness of breath 
- Oxygen for comfort as needed - Regular diet 
- Hold off thoracentesis as patient is breathing comfortably.  
  
  
Dementia, new diagnosis :  
Collateral history and clinical progression highly suggestive of dementia. MRI brain-No acute intracranial process seen. 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 Old left occipital infarct.  EEG shows slowing and delta waves suggestive of cerebral disturbance, encephalopathy versus neurocognitive disorder. RPR negative, B12 wnl, TSH elevated. He does not have medical decision making capacity. Was started on Donepezil and thiamine, levoythyroxine. After family meeting, transitioned to [address] [place] [address]   
  
  
New onset Systolic Congestive Heart Failure, EF~20% Dyspnea on exertion d/t HFrEF & left pleural effusionECHO -echo shows extremely poor LV function, ejection fraction well under 20% with a spherical mass in the LV apex ,Possible long-standing granulomatous disease. Started on coreg, aldactone and Entresto by cards. Transitioned to [address] [place] [address]  PRN Lasix for comfort.  
 Hypothyroidism POA 
TSH elevated, Free T4 borderline low. Discontinued as patient CMO. Elevated lactic acid POA : -resolved with gentle hydration Elevated troponin POA: 
EKG shows sinus arrhythmia, T wave inversion in V5-V6. Troponin 0.07. Likely d/t congestive heart failure. Microcytic hypochromic anemia : 
likely iron deficiency.  
  
Code Status:  FULL Surrogate Decision Maker:  
primary as per family consensus, Mickey Ortega - Other Relative - [phone] Primary Decision Maker: Kaleigh Estimable - Sister - [phone] 
 
 
2 other siblings have decided to defer decision making to Simona Robles and Rachel Amaral Supplemental (Other) Decision Maker: Dee Fierro - Brother - [phone] Supplemental (Other) Decision Maker: Giacomo Wolf Sister - [phone] Sister Maia Justin has brain injury and unable to participate in [employer] for patient. 
  
  
DVT Prophylaxis: Hep SQ and SCD's 
GI Prophylaxis: not indicated 
  
Baseline:  unknown Subjective: Chief Complaint / Reason for Physician Visit \"I am doing great. I did not eat garcía because its tough to chew\". Discussed with RN events overnight. Review of Systems: No fevers, chills, appetite change, cough, sputum production, shortness of breath, dyspnea on exertion, nausea, vomitting, diarrhea, constipation, chest pain, leg edema, abdominal pain, joint pain, rash, itching. olerating diet. Objective: VITALS:  
Last 24hrs VS reviewed since prior progress note. Most recent are: 
Patient Vitals for the past 24 hrs: 
 Temp Pulse Resp BP SpO2  
10/13/19 0720 98 °F (36.7 °C) 92 16 114/70 100 % 10/12/19 1925 97.8 °F (36.6 °C) 93 16 112/72 100 % 10/12/19 1303  88 14 93/57  No intake or output data in the 24 hours ending 10/13/19 1154 PHYSICAL EXAM: 
General: WD, WN. Alert, cooperative, no acute distress EENT:  EOMI. Anicteric sclerae. MMM Resp:  CTA bilaterally, no wheezing or rales. No accessory muscle use CV:  Regular  rhythm,  normal S1/S2, no murmurs rubs gallops, No edema GI:  Soft, Non distended, Non tender. +Bowel sounds Neurologic:  Alert and oriented X self, normal speech Psych:   Poor insight. Not anxious nor agitated Skin:  No rashes. No jaundice Reviewed most current lab test results and cultures  YES Reviewed most current radiology test results   YES Review and summation of old records today    NO Reviewed patient's current orders and MAR    YES 
PMH/SH reviewed - no change compared to H&P 
________________________________________________________________________ Care Plan discussed with: 
  Comments Patient Family RN Care Manager Consultant Multidiciplinary team rounds were held today with , nursing, pharmacist and clinical coordinator. Patient's plan of care was discussed; medications were reviewed and discharge planning was addressed. ________________________________________________________________________ Total NON critical care TIME: 15   Minutes Total CRITICAL CARE TIME Spent:   Minutes non procedure based Comments >50% of visit spent in counseling and coordination of care    
________________________________________________________________________ Domenico Olson MD  
 
Procedures: see electronic medical records for all procedures/Xrays and details which were not copied into this note but were reviewed prior to creation of Plan. LABS: 
I reviewed today's most current labs and imaging studies. Pertinent labs include: No results for input(s): WBC, HGB, HCT, PLT, HGBEXT, HCTEXT, PLTEXT in the last 72 hours. No results for input(s): NA, K, CL, CO2, GLU, BUN, CREA, CA, MG, PHOS, ALB, TBIL, TBILI, SGOT, ALT, INR, INREXT in the last 72 hours.  
 
Signed: Domenico Olson MD

## 2019-10-13 NOTE — PROGRESS NOTES
Follow-Up Nutrition Assessment: 
  
INTERVENTIONS/RECOMMENDATIONS:  
·  comfort foods as medically appropriate · ·  
  
ASSESSMENT:  
Pt admitted with AMS, heart failure. Diet is regular with ensure available on each meal tray. Per cardiology, encephalopathy with poor prognosis. CHF management now for comfort. Hx also notable for hypothyroidism and pt has 2 open/draining areas on buttocks. Wound care consulted. Family meeting has taken place, palliative care consulted. Family has chosen [facility]. 
  
Diet Order: NPO(was cardiac low Na+) % Eaten:   
Patient Vitals for the past 72 hrs: 
  % Diet Eaten 10/12/19 0946 60 % 10/11/19 1735 100 % 10/10/19 1343 100 % 10/09/19 1228 50 % 10/08/19 0810 0 %  
  
Pertinent Medications: [x]Reviewed []Other Pertinent Labs: [x]Reviewed []Other Food Allergies: [x]None []Other Last BM:           [x]Active     []Hyperactive  []Hypoactive       [] Absent BS Skin:     [] Intact              [] Incision           [x] Breakdown                [] Other: 
  
Anthropometrics:  
Height: 5' 5\" (165.1 cm)         Weight: 64.2 kg (141 lb 8 oz) IBW (%IBW): 64.4 kg (142 lb) (99.65 %)       UBW (%UBW):   (  %) Last Weight Metrics: 
Weight Loss Metrics 10/13/2019 9/30/2019 Today's Wt 141 lb 8 oz -  
BMI - 23.55 kg/m2  
  
  
BMI: Body mass index is 23.55 kg/m². This BMI is indicative of: 
 []Underweight    [x]Normal    []Overweight    [] Obesity   [] Extreme Obesity (BMI>40)  
  
Estimated Nutrition Needs (Based on):  
6420 Kcals/day(1545 x 1.2) , 67 g(~ 1 g/kg) Protein Carbohydrate: At Least 130 g/day  Fluids: 1280 mL/day (MD order) 
  
NUTRITION DIAGNOSES:  
Problem:  No nutritional diagnosis at this time Etiology: related to Signs/Symptoms: as evidenced by     
  
NUTRITION INTERVENTIONS: 
              comfort only GOAL:  
some po intake for comfort 
  
LEARNING NEEDS (Diet, Food/Nutrient-Drug Interaction):  
 [x] None Identified 
 [] Identified and Education Provided/Documented 
 [] Identified and Pt declined/was not appropriate Cultureal, Buddhist, OR Ethnic Dietary Needs:  
 [x] None Identified 
 [] Identified and Addressed 
  
 [x] Interdisciplinary Care Plan Reviewed/Documented  
 [x] Discharge Planning:  Comfort care  
  MONITORING Luis Cole Outcomes: Behavior Food/Nutrient Intake Outcomes: Total energy intake Physical Signs/Symptoms Outcomes: Other (comment) 
  
NUTRITION RISK:  
 [x] Patient At Nutritional Risk      
 [] Patient Not At Nutritional Risk 
  
PT SEEN FOR:  
 []  MD Consult: []Calorie Count []Diabetic Diet Education []Diet Education []Electrolyte Management 
                                    [x]General Nutrition Management and Supplements []Management of Tube Feeding []TPN Recommendations []  RN Referral:             []MST score >=2 
                                    []Enteral/Parenteral Nutrition PTA []Pregnant: Gestational DM or Multigestation 
                                    []Pressure Ulcer/Wound Care needs 
                                      
[]  Low BMI 
[]  MAYANK North, PhD, RD, [facility] Pager [phone]

## 2019-10-14 NOTE — PROGRESS NOTES
Physical therapy:  
 
Patient declining physical therapy secondary to increased fatigue. Will attempt as able.   
 
Major Click, PT

## 2019-10-14 NOTE — PROGRESS NOTES
Bedside shift change report given to Lin RN (oncoming nurse) by Mark Marshall (offgoing nurse). Report included the following information SBAR and Kardex.

## 2019-10-14 NOTE — PROGRESS NOTES
Hospitalist Progress Note NAME: Yohana Carmona :  1950 MRN:  [id number] Room Number:  316/32  @ [facility] Summary: 78 y.o. male whom presented on 2019 with Assessment / Plan: 
No change in plan. Awaiting Medicaid application 
  
  
Comfort measures only Goals of care discussion Family meeting held today- [facility] physician Dr. Joana Parmar [facility] and [facility] care management. Family members present - family- sister Karen Guo, brother Kelli Cruz and his wife Hector Lamas (on speaker phone), and nephew Lazaro Lima and his wife Bill Sherman (who is a long time nurse. ). Other sister Tg Ferrara has deferred decision making to other family members. Patient was transitioned to [address] [place] [address]. Family preference is [facility].  
  
- Lorazepam 2 mg SL PRN for anxiety. - Morphine 10 mg PRN for pain/shortness of breath - Lasix 40 mg oral PRN for shortness of breath 
- Oxygen for comfort as needed - Regular diet 
- Hold off thoracentesis as patient is breathing comfortably.  
  
  
Dementia, new diagnosis :  
Collateral history and clinical progression highly suggestive of dementia. MRI brain-No acute intracranial process seen. 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 Old left occipital infarct.  EEG shows slowing and delta waves suggestive of cerebral disturbance, encephalopathy versus neurocognitive disorder. RPR negative, B12 wnl, TSH elevated. He does not have medical decision making capacity. Was started on Donepezil and thiamine, levoythyroxine. After family meeting, transitioned to [address] [place] [address]   
  
  
New onset Systolic Congestive Heart Failure, EF~20% Dyspnea on exertion d/t HFrEF & left pleural effusionECHO -echo shows extremely poor LV function, ejection fraction well under 20% with a spherical mass in the LV apex ,Possible long-standing granulomatous disease. Started on coreg, aldactone and Entresto by cards. Transitioned to CMO. PRN Lasix for comfort.  
Hypothyroidism POA 
TSH elevated, Free T4 borderline low. Discontinued as patient CMO. Elevated lactic acid POA : -resolved with gentle hydration Elevated troponin POA: 
EKG shows sinus arrhythmia, T wave inversion in V5-V6. Troponin 0.07. Likely d/t congestive heart failure. Microcytic hypochromic anemia : 
likely iron deficiency.  
  
Code Status:  FULL Surrogate Decision Maker:  
primary as per family consensus, Elvira Moreira - Other Relative - [phone] Primary Decision Maker: Denisse Stewart - Sister - [phone] 
 
 
2 other siblings have decided to defer decision making to Ever James and Michael Linares Supplemental (Other) Decision Maker: Angelicabhumi Jennifer - Brother - [phone] Supplemental (Other) Decision Maker: Duane Rosado Sister - [phone] Sister Oscar Paulson has brain injury and unable to participate in [employer] for patient. 
  
  
DVT Prophylaxis: Hep SQ and SCD's 
GI Prophylaxis: not indicated 
  
Baseline:  unknown Subjective: Chief Complaint / Reason for Physician Visit \"I am doing great\". Discussed with RN events overnight. Review of Systems: No fevers, chills, appetite change, cough, sputum production, shortness of breath, dyspnea on exertion, nausea, vomitting, diarrhea, constipation, chest pain, leg edema, abdominal pain, joint pain, rash, itching. Tolerating diet. Objective: VITALS:  
Last 24hrs VS reviewed since prior progress note. Most recent are: 
Patient Vitals for the past 24 hrs: 
 Temp Pulse Resp BP SpO2  
10/14/19 0806 97.6 °F (36.4 °C) 98 16 114/78 100 % 10/13/19 1911 97.9 °F (36.6 °C) 89 16 116/74 100 % No intake or output data in the 24 hours ending 10/14/19 1016 PHYSICAL EXAM: 
General: WD, WN. Alert, cooperative, no acute distress EENT:  EOMI. Anicteric sclerae. MMM Resp:  CTA bilaterally, no wheezing or rales. No accessory muscle use CV:  Regular  rhythm,  normal S1/S2, no murmurs rubs gallops, No edema GI:  Soft, Non distended, Non tender. +Bowel sounds Neurologic:  Alert and oriented X self, normal speech, Psych:   Not anxious nor agitated Skin:  No rashes. No jaundice Reviewed most current lab test results and cultures  YES Reviewed most current radiology test results   YES Review and summation of old records today    NO Reviewed patient's current orders and MAR    YES 
PMH/SH reviewed - no change compared to H&P 
________________________________________________________________________ Care Plan discussed with: 
  Comments Patient  x Family RN  x Care Manager  x Consultant Multidiciplinary team rounds were held today with , nursing, pharmacist and clinical coordinator. Patient's plan of care was discussed; medications were reviewed and discharge planning was addressed. ________________________________________________________________________ Total NON critical care TIME:  15   Minutes Total CRITICAL CARE TIME Spent:   Minutes non procedure based Comments >50% of visit spent in counseling and coordination of care    
________________________________________________________________________ Eden López MD  
 
Procedures: see electronic medical records for all procedures/Xrays and details which were not copied into this note but were reviewed prior to creation of Plan. LABS: 
I reviewed today's most current labs and imaging studies. Pertinent labs include: No results for input(s): WBC, HGB, HCT, PLT, HGBEXT, HCTEXT, PLTEXT in the last 72 hours. No results for input(s): NA, K, CL, CO2, GLU, BUN, CREA, CA, MG, PHOS, ALB, TBIL, TBILI, SGOT, ALT, INR, INREXT in the last 72 hours.  
 
Signed: Eden López MD

## 2019-10-15 NOTE — PROGRESS NOTES
Problem: Pressure Injury - Risk of 
Goal: *Prevention of pressure injury Description Document Yosvany Scale and appropriate interventions in the flowsheet. Outcome: Progressing Towards Goal 
Note:  
Pressure Injury Interventions: 
Sensory Interventions: Assess need for specialty bed, Check visual cues for pain, Keep linens dry and wrinkle-free, Maintain/enhance activity level, Minimize linen layers, Turn and reposition approx. every two hours (pillows and wedges if needed) Moisture Interventions: Absorbent underpads, Apply protective barrier, creams and emollients, Check for incontinence Q2 hours and as needed, Maintain skin hydration (lotion/cream), Minimize layers Activity Interventions: Increase time out of bed, Assess need for specialty bed Mobility Interventions: Assess need for specialty bed, HOB 30 degrees or less, Turn and reposition approx. every two hours(pillow and wedges) Nutrition Interventions: Document food/fluid/supplement intake Friction and Shear Interventions: Apply protective barrier, creams and emollients, Foam dressings/transparent film/skin sealants, Minimize layers Problem: Patient Education: Go to Patient Education Activity Goal: Patient/Family Education Outcome: Progressing Towards Goal 
  
Problem: Falls - Risk of 
Goal: *Absence of Falls Description Document Rebbecca Persons Fall Risk and appropriate interventions in the flowsheet. Outcome: Progressing Towards Goal 
Note:  
Fall Risk Interventions: 
Mobility Interventions: Bed/chair exit alarm, Communicate number of staff needed for ambulation/transfer, Patient to call before getting OOB, Utilize walker, cane, or other assistive device Mentation Interventions: Adequate sleep, hydration, pain control, Bed/chair exit alarm, More frequent rounding, Reorient patient, Room close to nurse's station, Self-releasing belt Medication Interventions: Bed/chair exit alarm Elimination Interventions: Bed/chair exit alarm, Call light in reach, Patient to call for help with toileting needs, Toileting schedule/hourly rounds, Urinal in reach, Stay With Me (per policy) Problem: Patient Education: Go to Patient Education Activity Goal: Patient/Family Education Outcome: Progressing Towards Goal 
  
Problem: Cardiac Output -  Decreased Goal: *Vital signs within specified parameters Outcome: Progressing Towards Goal 
Goal: *Optimal cardiac output Outcome: Progressing Towards Goal 
Goal: *Absence of hypoxia Outcome: Progressing Towards Goal

## 2019-10-15 NOTE — PROGRESS NOTES
Hospitalist Progress Note NAME: Jose Wood :  1950 MRN:  [id number] Room Number:  221/40  @ [facility] Summary: 78 y.o. male whom presented on 2019 with Assessment / Plan: 
 
Assessment / Plan: 
No change in plan. Awaiting Medicaid application 
  
  
Comfort measures only Goals of care discussion Family meeting held today- Nichelle Anderson physician Dr. Honey Marie [facility] and Beatriz Goddard care management. Family members present - family- sister Frankie Rucker, brother Heidi Callaway and his wife Diego Capps (on speaker phone), and nephew Ariel Julien and his wife Dalia Cordero (who is a long time nurse. ). Other sister Korin Sierra has deferred decision making to other family members. Patient was transitioned to [address] [place] [address]. Family preference is [facility].  
  
- Lorazepam 2 mg SL PRN for anxiety. - Morphine 10 mg PRN for pain/shortness of breath - Lasix 40 mg oral PRN for shortness of breath 
- Oxygen for comfort as needed - Regular diet 
 
  
  
Dementia, new diagnosis :  
Collateral history and clinical progression highly suggestive of dementia. MRI brain-No acute intracranial process seen. 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 Old left occipital infarct.  EEG shows slowing and delta waves suggestive of cerebral disturbance, encephalopathy versus neurocognitive disorder. RPR negative, B12 wnl, TSH elevated. He does not have medical decision making capacity. Was started on Donepezil and thiamine, levoythyroxine. After family meeting, transitioned to [address] [place] [address].   
  
  
New onset Systolic Congestive Heart Failure, EF~20% Dyspnea on exertion d/t HFrEF & left pleural effusionECHO -echo shows extremely poor LV function, ejection fraction well under 20% with a spherical mass in the LV apex ,Possible long-standing granulomatous disease. Started on coreg, aldactone and Entresto by cards. Transitioned to [address] [place] [address] PRN Lasix for comfort.  
Hypothyroidism POA TSH elevated, Free T4 borderline low. Discontinued as patient CMO. Elevated lactic acid POA : -resolved with gentle hydration Elevated troponin POA: 
EKG shows sinus arrhythmia, T wave inversion in V5-V6. Troponin 0.07. Likely d/t congestive heart failure. Microcytic hypochromic anemia : 
likely iron deficiency.  
  
 
Code Status:  FULL Surrogate Decision Maker:  
primary as per family consensus, Nitin Ferguson - Other Relative - [phone] Primary Decision Maker: Galo Leon - Sister - [phone] 
 
 
2 other siblings have decided to defer decision making to Fermín Flood and Salazar Keene Supplemental (Other) Decision Maker: Sharyn Li - Brother - [phone] Supplemental (Other) Decision Maker: Suri Sifuentes Sister - [phone] Sister Margarita Hahn has brain injury and unable to participate in theScore 64 for patient. 
  
  
DVT Prophylaxis: Hep SQ and SCD's 
GI Prophylaxis: not indicated 
  
Baseline:  unknown Subjective: Chief Complaint / Reason for Physician Visit Sitting up in chair having breakfast. He 'feels well', pleasantly confused, asks this writer if she is ''. Discussed with RN events overnight. Review of Systems: No fevers, chills, appetite change, cough, sputum production, shortness of breath, dyspnea on exertion, nausea, vomitting, diarrhea, constipation, chest pain, leg edema, abdominal pain, joint pain, rash, itching. Tolerating diet. Objective: VITALS:  
Last 24hrs VS reviewed since prior progress note. Most recent are: 
Patient Vitals for the past 24 hrs: 
 Temp Pulse Resp BP SpO2  
10/15/19 0750 98.1 °F (36.7 °C) 96 18 119/82   
10/14/19 1955 98 °F (36.7 °C) 93 20 105/73 98 % No intake or output data in the 24 hours ending 10/15/19 1238 PHYSICAL EXAM: 
General: Alert, cooperative, no acute distress EENT:  EOMI. Anicteric sclerae. MMM Resp:  CTA bilaterally, no wheezing or rales. No accessory muscle use CV:  Regular  rhythm,  normal S1/S2, no murmurs rubs gallops, No edema GI:  Soft, Non distended, Non tender. +Bowel sounds Neurologic:  Alert and oriented to self, normal speech, Psych:   Poor insight due to underlying dementia. Not anxious nor agitated Skin:  No rashes. No jaundice Reviewed most current lab test results and cultures  YES Reviewed most current radiology test results   YES Review and summation of old records today    NO Reviewed patient's current orders and MAR    YES 
PMH/SH reviewed - no change compared to H&P 
________________________________________________________________________ Care Plan discussed with: 
  Comments Patient Family RN  x Care Manager  x Consultant Multidiciplinary team rounds were held today with , nursing, pharmacist and clinical coordinator. Patient's plan of care was discussed; medications were reviewed and discharge planning was addressed. ________________________________________________________________________ Total NON critical care TIME:  10   Minutes Total CRITICAL CARE TIME Spent:   Minutes non procedure based Comments >50% of visit spent in counseling and coordination of care    
________________________________________________________________________ Concetta Puri MD  
 
Procedures: see electronic medical records for all procedures/Xrays and details which were not copied into this note but were reviewed prior to creation of Plan. LABS: 
I reviewed today's most current labs and imaging studies. Pertinent labs include: No results for input(s): WBC, HGB, HCT, PLT, HGBEXT, HCTEXT, PLTEXT in the last 72 hours. No results for input(s): NA, K, CL, CO2, GLU, BUN, CREA, CA, MG, PHOS, ALB, TBIL, TBILI, SGOT, ALT, INR, INREXT in the last 72 hours.  
 
Signed: Concetta Puri MD

## 2019-10-15 NOTE — PROGRESS NOTES
Spiritual Care Partner Volunteer visited patient in Med Surg at [facility] on 10/15/2019. Documented by: 
Jazmin Taylor, [facility], Spiritual Care Intern

## 2019-10-15 NOTE — PROGRESS NOTES
Bedside and Verbal shift change report given to [address] [place] [address] [address] (oncoming nurse) by Laura Ayoub RN (offgoing nurse). Report included the following information SBAR, Kardex, Procedure Summary, Intake/Output, MAR, Recent Results and Med Rec Status.

## 2019-10-15 NOTE — PROGRESS NOTES
Care Plan reviewed Problem: Pressure Injury - Risk of 
Goal: *Prevention of pressure injury Description Document Yosvany Scale and appropriate interventions in the flowsheet. Outcome: Progressing Towards Goal 
Note:  
Pressure Injury Interventions: 
Sensory Interventions: Assess need for specialty bed, Check visual cues for pain, Keep linens dry and wrinkle-free, Maintain/enhance activity level, Minimize linen layers, Turn and reposition approx. every two hours (pillows and wedges if needed) Moisture Interventions: Absorbent underpads, Apply protective barrier, creams and emollients, Check for incontinence Q2 hours and as needed, Maintain skin hydration (lotion/cream), Minimize layers Activity Interventions: Increase time out of bed, Assess need for specialty bed Mobility Interventions: Assess need for specialty bed, HOB 30 degrees or less, Turn and reposition approx. every two hours(pillow and wedges) Nutrition Interventions: Document food/fluid/supplement intake Friction and Shear Interventions: Apply protective barrier, creams and emollients, Foam dressings/transparent film/skin sealants, Minimize layers Problem: Patient Education: Go to Patient Education Activity Goal: Patient/Family Education Outcome: Progressing Towards Goal 
  
Problem: Falls - Risk of 
Goal: *Absence of Falls Description Document [facility] Fall Risk and appropriate interventions in the flowsheet. Outcome: Progressing Towards Goal 
Note:  
Fall Risk Interventions: 
Mobility Interventions: Bed/chair exit alarm, Communicate number of staff needed for ambulation/transfer, Patient to call before getting OOB, Utilize walker, cane, or other assistive device Mentation Interventions: Adequate sleep, hydration, pain control, Bed/chair exit alarm, More frequent rounding, Reorient patient, Room close to nurse's station, Self-releasing belt Medication Interventions: Bed/chair exit alarm Elimination Interventions: Bed/chair exit alarm, Call light in reach, Patient to call for help with toileting needs, Toileting schedule/hourly rounds, Urinal in reach, Stay With Me (per policy) Problem: Patient Education: Go to Patient Education Activity Goal: Patient/Family Education Outcome: Progressing Towards Goal 
  
Problem: Patient Education: Go to Patient Education Activity Goal: Patient/Family Education Outcome: Progressing Towards Goal 
  
Problem: Patient Education: Go to Patient Education Activity Goal: Patient/Family Education Outcome: Progressing Towards Goal 
  
Problem: Cardiac Output -  Decreased Goal: *Vital signs within specified parameters Outcome: Progressing Towards Goal 
Goal: *Optimal cardiac output Outcome: Progressing Towards Goal 
Goal: *Absence of hypoxia Outcome: Progressing Towards Goal 
Goal: *Absence of peripheral edema Outcome: Progressing Towards Goal 
Goal: *Intravascular fluid volume and electrolyte balance Outcome: Progressing Towards Goal

## 2019-10-16 NOTE — PROGRESS NOTES
Hospitalist Progress Note NAME: Wilmer Wiggins :  1950 MRN:  [id number] Room Number:  447/64  @ [facility] Summary: 78 y.o. male whom presented on 2019 with Assessment / Plan: 
 
Update- 
C/W SAME CARE PLAN . Awaiting Medicaid application 
  
  
Comfort measures only Goals of care discussion Reviewed records RE: [address] [facility] with Palliative Patient was transitioned to [address] [place] [address]. Family preference is [facility].  
 - Lorazepam 2 mg SL PRN for anxiety. - Morphine 10 mg PRN for pain/shortness of breath - Lasix 40 mg oral PRN for shortness of breath 
- Oxygen for comfort as needed - Regular diet Dementia, new diagnosis :  
Collateral history and clinical progression highly suggestive of dementia. MRI brain-No acute intracranial process seen Kris Shelton RPR negative, B12 wnl, TSH elevated. He does not have medical decision making capacity. Was started on Donepezil and thiamine, levoythyroxine. After family meeting, transitioned to [address] [place] [address].   
  
  
New onset Systolic Congestive Heart Failure, EF~20% Dyspnea on exertion d/t HFrEF & left pleural effusionECHO -echo shows extremely poor LV function, ejection fraction well under 20% with a spherical mass in the LV apex ,Possible long-standing granulomatous disease. Started on coreg, aldactone and Entresto by cards. Transitioned to [address] [place] [address] PRN Lasix for comfort.  
 
Hypothyroidism POA 
TSH elevated, Free T4 borderline low. Discontinued as patient CMO. Elevated troponin POA: 
EKG shows sinus arrhythmia, T wave inversion in V5-V6. Troponin 0.07. Likely d/t congestive heart failure. Microcytic hypochromic anemia : 
likely iron deficiency.  
  
 
Code Status:  FULL Surrogate Decision Maker:  
Supplemental (Other) Decision Maker: Lindyalfredo Bernabe - Sister - [phone] Supplemental (Other) Decision Maker: Mitzi Greene - Sister - [phone] Supplemental (Other) Decision Maker: Hugo Yoon -  - [phone] 
  
  
DVT Prophylaxis: Hep SQ and SCD's 
GI Prophylaxis: not indicated 
  
Baseline:  unknown Subjective: Chief Complaint / Reason for Physician Visit No c/o overnight. Resting in bed currently Discussed with RN events overnight. Review of Systems: No fevers, chills, appetite change, cough, sputum production, shortness of breath, dyspnea on exertion, nausea, vomitting, diarrhea, constipation, chest pain, leg edema, abdominal pain, joint pain, rash, itching. Tolerating diet. Objective: VITALS:  
Last 24hrs VS reviewed since prior progress note. Most recent are: 
Patient Vitals for the past 24 hrs: 
 Temp Pulse Resp BP SpO2  
10/16/19 0801 97.2 °F (36.2 °C) (!) 101 20 121/89 98 % 10/15/19 2020 97.4 °F (36.3 °C) (!) 106 22 131/89 100 % No intake or output data in the 24 hours ending 10/16/19 1142 PHYSICAL EXAM: 
General: Alert, cooperative, no acute distress EENT:  EOMI. Anicteric sclerae. MMM Resp:  CTA bilaterally, no wheezing or rales. No accessory muscle use CV:  Regular  rhythm,  normal S1/S2, no murmurs rubs gallops, No edema GI:  Soft, Non distended, Non tender. +Bowel sounds Neurologic:  Alert and oriented to self, normal speech, Psych:   Poor insight due to underlying dementia. Not anxious nor agitated Skin:  No rashes. No jaundice Reviewed most current lab test results and cultures  YES Reviewed most current radiology test results   YES Review and summation of old records today    NO Reviewed patient's current orders and MAR    YES 
PMH/SH reviewed - no change compared to H&P 
________________________________________________________________________ Care Plan discussed with: 
  Comments Patient Family RN  x Care Manager  x Consultant                   Multidiciplinary team rounds were held today with case manager, nursing, pharmacist and clinical coordinator. Patient's plan of care was discussed; medications were reviewed and discharge planning was addressed. ________________________________________________________________________ Total NON critical care TIME:  20   Minutes Total CRITICAL CARE TIME Spent:   Minutes non procedure based Comments >50% of visit spent in counseling and coordination of care    
________________________________________________________________________ Keiry Anton MD  
 
Procedures: see electronic medical records for all procedures/Xrays and details which were not copied into this note but were reviewed prior to creation of Plan. LABS: 
I reviewed today's most current labs and imaging studies. Pertinent labs include: No results for input(s): WBC, HGB, HCT, PLT, HGBEXT, HCTEXT, PLTEXT, HGBEXT, HCTEXT, PLTEXT in the last 72 hours. No results for input(s): NA, K, CL, CO2, GLU, BUN, CREA, CA, MG, PHOS, ALB, TBIL, TBILI, SGOT, ALT, INR, INREXT, INREXT in the last 72 hours.  
 
Signed: Keiry Anton MD

## 2019-10-16 NOTE — PROGRESS NOTES
.Bedside and Verbal shift change report given to  [facility] RN (oncoming nurse) by Merrick Flores RN (offgoing nurse). Report included the following information SBAR, Kardex, Intake/Output, MAR and Accordion.

## 2019-10-16 NOTE — PROGRESS NOTES
Problem: Pressure Injury - Risk of 
Goal: *Prevention of pressure injury Description Document Yosvany Scale and appropriate interventions in the flowsheet. Outcome: Progressing Towards Goal 
Note:  
Pressure Injury Interventions: 
Sensory Interventions: Check visual cues for pain, Keep linens dry and wrinkle-free, Turn and reposition approx. every two hours (pillows and wedges if needed), Maintain/enhance activity level, Assess need for specialty bed Moisture Interventions: Absorbent underpads, Check for incontinence Q2 hours and as needed, Maintain skin hydration (lotion/cream), Minimize layers Activity Interventions: Chair cushion, Increase time out of bed, Assess need for specialty bed Mobility Interventions: Turn and reposition approx. every two hours(pillow and wedges), Assess need for specialty bed Nutrition Interventions: Document food/fluid/supplement intake Friction and Shear Interventions: HOB 30 degrees or less, Foam dressings/transparent film/skin sealants, Apply protective barrier, creams and emollients Problem: Patient Education: Go to Patient Education Activity Goal: Patient/Family Education Outcome: Progressing Towards Goal 
  
Problem: Falls - Risk of 
Goal: *Absence of Falls Description Document [facility] Fall Risk and appropriate interventions in the flowsheet. Outcome: Progressing Towards Goal 
Note:  
Fall Risk Interventions: 
Mobility Interventions: Bed/chair exit alarm, Communicate number of staff needed for ambulation/transfer, Patient to call before getting OOB, Utilize walker, cane, or other assistive device Mentation Interventions: Adequate sleep, hydration, pain control, Bed/chair exit alarm, Door open when patient unattended, More frequent rounding, Reorient patient, Room close to nurse's station, Toileting rounds Medication Interventions: Bed/chair exit alarm Elimination Interventions: Bed/chair exit alarm, Call light in reach, Patient to call for help with toileting needs, Toilet paper/wipes in reach, Toileting schedule/hourly rounds, Urinal in reach Problem: Patient Education: Go to Patient Education Activity Goal: Patient/Family Education Outcome: Progressing Towards Goal 
  
Problem: Cardiac Output -  Decreased Goal: *Vital signs within specified parameters Outcome: Progressing Towards Goal 
Goal: *Optimal cardiac output Outcome: Progressing Towards Goal 
Goal: *Absence of hypoxia Outcome: Progressing Towards Goal 
Goal: *Intravascular fluid volume and electrolyte balance Outcome: Progressing Towards Goal 
  
Problem: Cardiac Output -  Decreased Goal: *Absence of peripheral edema Outcome: Not Progressing Towards Goal 
  
Problem: Patient Education: Go to Patient Education Activity Goal: Patient/Family Education Outcome: Resolved/Not Met Problem: Patient Education: Go to Patient Education Activity Goal: Patient/Family Education Outcome: Resolved/Not Met

## 2019-10-16 NOTE — PROGRESS NOTES
Problem: Pressure Injury - Risk of 
Goal: *Prevention of pressure injury Description Document Yosvany Scale and appropriate interventions in the flowsheet. Outcome: Progressing Towards Goal 
Note:  
Pressure Injury Interventions: 
Sensory Interventions: Check visual cues for pain, Keep linens dry and wrinkle-free, Turn and reposition approx. every two hours (pillows and wedges if needed), Maintain/enhance activity level, Assess need for specialty bed Moisture Interventions: Absorbent underpads, Check for incontinence Q2 hours and as needed, Maintain skin hydration (lotion/cream), Minimize layers Activity Interventions: Chair cushion, Increase time out of bed, Assess need for specialty bed Mobility Interventions: Turn and reposition approx. every two hours(pillow and wedges), Assess need for specialty bed Nutrition Interventions: Document food/fluid/supplement intake Friction and Shear Interventions: HOB 30 degrees or less, Foam dressings/transparent film/skin sealants, Apply protective barrier, creams and emollients Problem: Patient Education: Go to Patient Education Activity Goal: Patient/Family Education Outcome: Progressing Towards Goal 
  
Problem: Falls - Risk of 
Goal: *Absence of Falls Description Document Rafi Tolliver Fall Risk and appropriate interventions in the flowsheet. Outcome: Progressing Towards Goal 
Note:  
Fall Risk Interventions: 
Mobility Interventions: Bed/chair exit alarm, Communicate number of staff needed for ambulation/transfer, Patient to call before getting OOB, Utilize walker, cane, or other assistive device Mentation Interventions: Adequate sleep, hydration, pain control, Bed/chair exit alarm, Door open when patient unattended, More frequent rounding, Reorient patient, Room close to nurse's station, Toileting rounds Medication Interventions: Bed/chair exit alarm Elimination Interventions: Bed/chair exit alarm, Call light in reach, Patient to call for help with toileting needs, Toilet paper/wipes in reach, Toileting schedule/hourly rounds, Urinal in reach Problem: Patient Education: Go to Patient Education Activity Goal: Patient/Family Education Outcome: Progressing Towards Goal 
  
Problem: Patient Education: Go to Patient Education Activity Goal: Patient/Family Education Outcome: Progressing Towards Goal 
  
Problem: Patient Education: Go to Patient Education Activity Goal: Patient/Family Education Outcome: Progressing Towards Goal 
  
Problem: Cardiac Output -  Decreased Goal: *Vital signs within specified parameters Outcome: Progressing Towards Goal 
Goal: *Optimal cardiac output Outcome: Progressing Towards Goal 
Goal: *Absence of hypoxia Outcome: Progressing Towards Goal 
Goal: *Absence of peripheral edema Outcome: Progressing Towards Goal 
Goal: *Intravascular fluid volume and electrolyte balance Outcome: Progressing Towards Goal

## 2019-10-17 NOTE — PROGRESS NOTES
Problem: Mobility Impaired (Adult and Pediatric) Goal: *Acute Goals and Plan of Care (Insert Text) Description FUNCTIONAL STATUS PRIOR TO ADMISSION: Patient lived alone with sister helping as needed,without use of DME, HOME SUPPORT PRIOR TO ADMISSION: The patient lived alone with family to provide assistance. Physical Therapy Goals Initiated 10/1/2019; Reviewed 10/11/19; Updated 10/17/19: 
1. Patient will move from supine to sit and sit to supine, scoot up and down and roll side to side in bed with stand by assistance within 7 day(s). 2.  Patient will transfer from bed to chair and chair to bed with stand by assistance using the least restrictive device within 7 day(s). 3.  Patient will perform sit to stand with stand by assistance within 7 day(s). 4.  Patient will ambulate with contact guard assistance for 300 feet with the least restrictive device within 7 day(s). Outcome: Progressing Towards Goal 
 
PHYSICAL THERAPY TREATMENT Weekly reassessment Patient: Chema Freire (31 y.o. male) Date: 10/17/2019 Diagnosis: Altered mental state [R41.82] Heart failure ([facility] [address] [I50.9] <principal problem not specified> Precautions:   
Chart, physical therapy assessment, plan of care and goals were reviewed. ASSESSMENT Patient continues with skilled PT services and is progressing towards goals. Patient presents with increased fatigue this date. Patient required minimal assistance for bed mobility this date. Patient stood with CGA. He practiced sit to stand transfers from bed and toilet today. Difficulty rising from low surface of toilet, and required heavy use of UEs on grab bars in bathroom. Patient ambulated 125 feet with contact guard assistance. Patient with unsteadiness throughout ambulation, and 3 mild losses of balance to left. Other factors to consider for discharge: Cognition PLAN : 
Patient continues to benefit from skilled intervention to address the above impairments. Continue treatment per established plan of care. to address goals. Recommendation for discharge: (in order for the patient to meet his/her long term goals) Therapy up to 5 days/week in SNF setting / 24/7 supervision Equipment recommendations for successful discharge (if) home: Patient would likely benefit from walker but reports he won't use it SUBJECTIVE:  
Patient stated I'm tired.  OBJECTIVE DATA SUMMARY:  
Critical Behavior: 
Neurologic State: Alert, Confused Orientation Level: Oriented to person, Disoriented to place, Disoriented to situation, Disoriented to time Cognition: Follows commands, Impaired decision making, Impulsive Safety/Judgement: Decreased awareness of need for assistance, Decreased awareness of need for safety, Decreased insight into deficits Functional Mobility Training: 
Bed Mobility: 
Supine to Sit: Minimum assistance Transfers: 
Sit to Stand: Contact guard assistance Stand to Sit: Contact guard assistance Practiced multiple sit to stand transfers from bed and toilet Balance: 
Sitting: Intact Standing: Impaired; Without support Standing - Static: Good Standing - Dynamic : Fair Ambulation/Gait Training: 
Distance (ft): 125 Feet (ft) Assistive Device: Gait belt Ambulation - Level of Assistance: Contact guard assistance Gait Abnormalities: Decreased step clearance; Path deviations Base of Support: Narrowed Speed/Heather: Slow Step Length: Right shortened;Left shortened Pain Rating: 
No pain reported Activity Tolerance:  
Fair Please refer to the flowsheet for vital signs taken during this treatment. After treatment patient left in no apparent distress:  
Sitting in chair, Call bell within reach and Bed / chair alarm activated COMMUNICATION/COLLABORATION:  
The patients plan of care was discussed with: Registered Nurse and Certified Nursing Assistant/Patient Care Technician Chikis Blanton PT 
 Time Calculation: 24 mins

## 2019-10-17 NOTE — PROGRESS NOTES
Problem: Pressure Injury - Risk of 
Goal: *Prevention of pressure injury Description Document Yosvany Scale and appropriate interventions in the flowsheet. Outcome: Progressing Towards Goal 
Note:  
Pressure Injury Interventions: 
Sensory Interventions: Check visual cues for pain, Minimize linen layers Moisture Interventions: Absorbent underpads, Internal/External urinary devices Activity Interventions: Chair cushion Mobility Interventions: HOB 30 degrees or less Nutrition Interventions: Document food/fluid/supplement intake Friction and Shear Interventions: HOB 30 degrees or less Problem: Falls - Risk of 
Goal: *Absence of Falls Description Document Tressia Bullion Fall Risk and appropriate interventions in the flowsheet. Outcome: Progressing Towards Goal 
Note:  
Fall Risk Interventions: 
Mobility Interventions: Bed/chair exit alarm, Patient to call before getting OOB Mentation Interventions: Adequate sleep, hydration, pain control, Bed/chair exit alarm Medication Interventions: Bed/chair exit alarm, Patient to call before getting OOB Elimination Interventions: Bed/chair exit alarm, Call light in reach, Patient to call for help with toileting needs, Toilet paper/wipes in reach, Toileting schedule/hourly rounds, Urinal in reach Problem: Cardiac Output -  Decreased Goal: *Vital signs within specified parameters Outcome: Progressing Towards Goal 
Goal: *Absence of hypoxia Outcome: Progressing Towards Goal 
  
Problem: Patient Education: Go to Patient Education Activity Goal: Patient/Family Education Outcome: Not Progressing Towards Goal

## 2019-10-17 NOTE — PROGRESS NOTES
Hospitalist Progress Note NAME: Krysten Camacho :  1950 MRN:  [id number] Room Number:  483/64  @ [facility] Summary: 78 y.o. male whom presented on 2019 with Assessment / Plan: 
 
Update- 
C/w same plan Awaiting Medicaid application 
  
  
Comfort measures only Goals of care discussion Reviewed records RE: [address] with Palliative Patient was transitioned to [address] [place] [address]. Family preference is [facility].  
 - Lorazepam 2 mg SL PRN for anxiety. - Morphine 10 mg PRN for pain/shortness of breath - Lasix 40 mg oral PRN for shortness of breath 
- Oxygen for comfort as needed - Regular diet Dementia, new diagnosis :  
Collateral history and clinical progression highly suggestive of dementia. MRI brain-No acute intracranial process seen Shereen Ask RPR negative, B12 wnl, TSH elevated. He does not have medical decision making capacity. Was started on Donepezil and thiamine, levoythyroxine. After family meeting, transitioned to [address] [place] [address].   
  
  
New onset Systolic Congestive Heart Failure, EF~20% Dyspnea on exertion d/t HFrEF & left pleural effusionECHO -echo shows extremely poor LV function, ejection fraction well under 20% with a spherical mass in the LV apex ,Possible long-standing granulomatous disease. Started on coreg, aldactone and Entresto by cards. Transitioned to [address] [place] [address] PRN Lasix for comfort.  
 
Hypothyroidism POA 
TSH elevated, Free T4 borderline low. Discontinued as patient CMO. Elevated troponin POA: 
EKG shows sinus arrhythmia, T wave inversion in V5-V6. Troponin 0.07. Likely d/t congestive heart failure. Microcytic hypochromic anemia : 
likely iron deficiency.  
  
 
Code Status:  FULL Surrogate Decision Maker:  
Supplemental (Other) Decision Maker: Archula Henry - Sister - [phone] Supplemental (Other) Decision Maker: Delfina Yousif - Sister - [phone] Supplemental (Other) Decision Maker: Kamlesh Chakraborty - Brother - [phone] 
  
  
DVT Prophylaxis: Hep SQ and SCD's 
GI Prophylaxis: not indicated 
  
Baseline:  unknown Subjective: Chief Complaint / Reason for Physician Visit No c/o overnight. Resting in bed currently Discussed with RN events overnight. Review of Systems: No fevers, chills, appetite change, cough, sputum production, shortness of breath, dyspnea on exertion, nausea, vomitting, diarrhea, constipation, chest pain, leg edema, abdominal pain, joint pain, rash, itching. Tolerating diet. Objective: VITALS:  
Last 24hrs VS reviewed since prior progress note. Most recent are: 
Patient Vitals for the past 24 hrs: 
 Temp Pulse Resp BP SpO2  
10/17/19 0720 98 °F (36.7 °C) 94 14 122/60 99 % 10/16/19 2012 98.2 °F (36.8 °C) 97 16 124/61 99 % 10/16/19 0801 97.2 °F (36.2 °C) (!) 101 20 121/89 98 % No intake or output data in the 24 hours ending 10/17/19 0759 PHYSICAL EXAM: 
General: Alert, cooperative, no acute distress EENT:  EOMI. Anicteric sclerae. MMM Resp:  CTA bilaterally, no wheezing or rales. No accessory muscle use CV:  Regular  rhythm,  normal S1/S2, no murmurs rubs gallops, No edema GI:  Soft, Non distended, Non tender. +Bowel sounds Neurologic:  Alert and oriented to self, normal speech, Psych:   Poor insight due to underlying dementia. Not anxious nor agitated Skin:  No rashes. No jaundice Reviewed most current lab test results and cultures  YES Reviewed most current radiology test results   YES Review and summation of old records today    NO Reviewed patient's current orders and MAR    YES 
PMH/ reviewed - no change compared to H&P 
________________________________________________________________________ Care Plan discussed with: 
  Comments Patient Family RN  x Care Manager  x Consultant Multidiciplinary team rounds were held today with , nursing, pharmacist and clinical coordinator.   Patient's plan of care was discussed; medications were reviewed and discharge planning was addressed. ________________________________________________________________________ Total NON critical care TIME:  20   Minutes Total CRITICAL CARE TIME Spent:   Minutes non procedure based Comments >50% of visit spent in counseling and coordination of care    
________________________________________________________________________ Luly Carbajal MD  
 
Procedures: see electronic medical records for all procedures/Xrays and details which were not copied into this note but were reviewed prior to creation of Plan. LABS: 
I reviewed today's most current labs and imaging studies. Pertinent labs include: No results for input(s): WBC, HGB, HCT, PLT, HGBEXT, HCTEXT, PLTEXT, HGBEXT, HCTEXT, PLTEXT in the last 72 hours. No results for input(s): NA, K, CL, CO2, GLU, BUN, CREA, CA, MG, PHOS, ALB, TBIL, TBILI, SGOT, ALT, INR, INREXT, INREXT in the last 72 hours.  
 
Signed: Luly Carbajal MD

## 2019-10-17 NOTE — PROGRESS NOTES
Bedside and Verbal shift change report given to [place], RN (oncoming nurse) by Lana Paul RN (offgoing nurse). Report included the following information SBAR, Kardex and MAR.

## 2019-10-17 NOTE — PROGRESS NOTES
Bedside shift change report given to [address],[address] (oncoming nurse) by Veronique Devi RN (offgoing nurse). Report included the following information SBAR, Kardex, Intake/Output and MAR.

## 2019-10-17 NOTE — PROGRESS NOTES
Palliative Medicine [place]: [phone] ([phone]) [facility]: [phone] ([phone]) Code Status: DNR Advance Care Planning: 
No AMD on file Diamante Ware Primary Decision Maker: Mitzi Greene - Sister - [phone] Primary Decision Maker: Becky Ye - Brother - [phone] Supplemental (Other) Decision Maker: Clem Snellen - Other Relative - [phone] Patient sister Sheron Camarena deferred decision making to siblings Inez Nj and [place]. Sister Blanca Aranda has brain injury and unable to participate in [employer] for patient. Chart reviewed. RN Bere Palacio provided update: patient eating well, walking a little. I met with patient in his room. He was awake and alert, oriented to self and knows he's in the hospital. Patient denied pain, discomfort. Patient stated that he wishes he were at home. He was able to talk about events that led to coming to hospital and his frustration with that. He felt he was doing OK on his own. When asked about his mood he said he was happy, denied sadness or depression from being in hospital for extended time. We talked broadly about plan going forward in terms of looking for placement in facility where he can get good care. Patient told me he had walked a little recently but preferred to sit or lay down and watch TV \"my body isn't used to walking. \" He also told me he loves vanilla ice cream and cookies he gets here and breakfast is his favorite meal. Provided active listening, emotional support. Will continue to follow for support. Thank you for the opportunity to be involved in the care of Mr. Idris Rocha and his family. Chelle Chacon, [facility], Supervisee in Social Work Palliative Medicine  [phone]

## 2019-10-18 NOTE — PROGRESS NOTES
Bedside shift change report given to Dottie RN (oncoming nurse) by Arabella Chan RN (offgoing nurse). Report included the following information SBAR and Kardex.

## 2019-10-18 NOTE — PROGRESS NOTES
Hospitalist Progress Note NAME: Marcie Gaming :  1950 MRN:  [id number] Room Number:  OUD2/17  @ [facility]  
 
 
[facility] Summary: 78 y.o. male whom presented on 2019 with Assessment / Plan: 
 
Update- 
C/w same plan Awaiting Medicaid application 
  
  
Comfort measures only Goals of care discussion Reviewed records RE: [address] with Palliative Patient was transitioned to [address] [place] [address]. Family preference is [facility].  
 - Lorazepam 2 mg SL PRN for anxiety. - Morphine 10 mg PRN for pain/shortness of breath - Lasix 40 mg oral PRN for shortness of breath 
- Oxygen for comfort as needed - Regular diet Dementia, new diagnosis :  
Collateral history and clinical progression highly suggestive of dementia. MRI brain-No acute intracranial process seen Benigno Oliva RPR negative, B12 wnl, TSH elevated. He does not have medical decision making capacity. Was started on Donepezil and thiamine, levoythyroxine. After family meeting, transitioned to [address] [place] [address].   
  
  
New onset Systolic Congestive Heart Failure, EF~20% Dyspnea on exertion d/t HFrEF & left pleural effusionECHO -echo shows extremely poor LV function, ejection fraction well under 20% with a spherical mass in the LV apex ,Possible long-standing granulomatous disease. Started on coreg, aldactone and Entresto by cards. Transitioned to [address] [place] [address] PRN Lasix for comfort.  
 
Hypothyroidism POA 
TSH elevated, Free T4 borderline low. Discontinued as patient CMO. Elevated troponin POA: 
EKG shows sinus arrhythmia, T wave inversion in V5-V6. Troponin 0.07. Likely d/t congestive heart failure. Microcytic hypochromic anemia : 
likely iron deficiency.  
  
 
Code Status:  FULL Surrogate Decision Maker:  
Supplemental (Other) Decision Maker: Jann Reaves - Sister - [phone] Supplemental (Other) Decision Maker: Екатерина Childs - Sister - [phone] Supplemental (Other) Decision Maker: Hugo Yoon - Brother - [phone] 
  
  
DVT Prophylaxis: Hep SQ and SCD's 
GI Prophylaxis: not indicated 
  
Baseline:  unknown Subjective: Chief Complaint / Reason for Physician Visit No c/o overnight. Resting in bed currently Discussed with RN events overnight. Review of Systems: No fevers, chills, appetite change, cough, sputum production, shortness of breath, dyspnea on exertion, nausea, vomitting, diarrhea, constipation, chest pain, leg edema, abdominal pain, joint pain, rash, itching. Tolerating diet. Objective: VITALS:  
Last 24hrs VS reviewed since prior progress note. Most recent are: 
Patient Vitals for the past 24 hrs: 
 Temp Pulse Resp BP SpO2  
10/18/19 0724 96.4 °F (35.8 °C) 100 16 (!) 128/95 100 % 10/17/19 1914 98 °F (36.7 °C) 99 18 142/82 100 % No intake or output data in the 24 hours ending 10/18/19 0830 PHYSICAL EXAM: 
General: Alert, cooperative, no acute distress EENT:  EOMI. Anicteric sclerae. MMM Resp:  CTA bilaterally, no wheezing or rales. No accessory muscle use CV:  Regular  rhythm,  normal S1/S2, no murmurs rubs gallops, No edema GI:  Soft, Non distended, Non tender. +Bowel sounds Neurologic:  Alert and oriented to self, normal speech, Psych:   Poor insight due to underlying dementia. Not anxious nor agitated Skin:  No rashes. No jaundice Reviewed most current lab test results and cultures  YES Reviewed most current radiology test results   YES Review and summation of old records today    NO Reviewed patient's current orders and MAR    YES 
PMH/ reviewed - no change compared to H&P 
________________________________________________________________________ Care Plan discussed with: 
  Comments Patient Family RN  x Care Manager  x Consultant Multidiciplinary team rounds were held today with , nursing, pharmacist and clinical coordinator.   Patient's plan of care was discussed; medications were reviewed and discharge planning was addressed. ________________________________________________________________________ Total NON critical care TIME:  20   Minutes Total CRITICAL CARE TIME Spent:   Minutes non procedure based Comments >50% of visit spent in counseling and coordination of care    
________________________________________________________________________ Luly Carbajal MD  
 
Procedures: see electronic medical records for all procedures/Xrays and details which were not copied into this note but were reviewed prior to creation of Plan. LABS: 
I reviewed today's most current labs and imaging studies. Pertinent labs include: No results for input(s): WBC, HGB, HCT, PLT, HGBEXT, HCTEXT, PLTEXT, HGBEXT, HCTEXT, PLTEXT in the last 72 hours. No results for input(s): NA, K, CL, CO2, GLU, BUN, CREA, CA, MG, PHOS, ALB, TBIL, TBILI, SGOT, ALT, INR, INREXT, INREXT in the last 72 hours.  
 
Signed: Luly Carbajal MD

## 2019-10-18 NOTE — PROGRESS NOTES
Problem: Pressure Injury - Risk of 
Goal: *Prevention of pressure injury Description Document Yosvany Scale and appropriate interventions in the flowsheet. Outcome: Progressing Towards Goal 
Note:  
Pressure Injury Interventions: 
Sensory Interventions: Assess changes in LOC, Avoid rigorous massage over bony prominences, Float heels, Keep linens dry and wrinkle-free, Minimize linen layers, Turn and reposition approx. every two hours (pillows and wedges if needed) Moisture Interventions: Absorbent underpads, Apply protective barrier, creams and emollients, Check for incontinence Q2 hours and as needed, Maintain skin hydration (lotion/cream), Minimize layers, Moisture barrier, Offer toileting Q_hr Activity Interventions: Chair cushion, Increase time out of bed Mobility Interventions: HOB 30 degrees or less, Turn and reposition approx. every two hours(pillow and wedges) Nutrition Interventions: Document food/fluid/supplement intake, Offer support with meals,snacks and hydration Friction and Shear Interventions: Apply protective barrier, creams and emollients, HOB 30 degrees or less, Lift sheet, Minimize layers, Transferring/repositioning devices Pt. had previous wound on sacral area (10/02). Today skin is intact with discoloration. Skin is open to air. Dressing is not present on area. Will continue to follow patient orders of turning pt q 2 hr. Problem: Patient Education: Go to Patient Education Activity Goal: Patient/Family Education Outcome: Progressing Towards Goal 
  
Problem: Falls - Risk of 
Goal: *Absence of Falls Description Document [facility] Fall Risk and appropriate interventions in the flowsheet. Outcome: Progressing Towards Goal 
Note:  
Fall Risk Interventions: 
Mobility Interventions: Bed/chair exit alarm, Patient to call before getting OOB, Strengthening exercises (ROM-active/passive), Utilize walker, cane, or other assistive device Mentation Interventions: Adequate sleep, hydration, pain control, Bed/chair exit alarm, Door open when patient unattended, More frequent rounding, Reorient patient, Room close to nurse's station, Toileting rounds, Update white board Medication Interventions: Bed/chair exit alarm, Patient to call before getting OOB, Teach patient to arise slowly Elimination Interventions: Bed/chair exit alarm, Call light in reach, Patient to call for help with toileting needs, Stay With Me (per policy), Toilet paper/wipes in reach, Toileting schedule/hourly rounds Problem: Patient Education: Go to Patient Education Activity Goal: Patient/Family Education Outcome: Progressing Towards Goal 
  
Problem: Patient Education: Go to Patient Education Activity Goal: Patient/Family Education Outcome: Progressing Towards Goal 
  
Problem: Patient Education: Go to Patient Education Activity Goal: Patient/Family Education Outcome: Progressing Towards Goal 
  
Problem: Cardiac Output -  Decreased Goal: *Vital signs within specified parameters Outcome: Progressing Towards Goal 
Goal: *Optimal cardiac output Outcome: Progressing Towards Goal 
Goal: *Absence of hypoxia Outcome: Progressing Towards Goal 
Goal: *Absence of peripheral edema Outcome: Progressing Towards Goal 
Note:  
Pt has +2 pitting edema in lower extremities. Weight was taken (71.2 kg). Will continue to monitor per protocol. Goal: *Intravascular fluid volume and electrolyte balance Outcome: Progressing Towards Goal

## 2019-10-19 NOTE — PROGRESS NOTES
1526) Bedside shift change report given to Bere Palacio RN (oncoming nurse) by Padmini Kelley RN (offgoing nurse). Report included the following information SBAR, Kardex and MAR.  
1600) pt report chest pain. [phone] pt stated chest pain resolved. Confirm with Dr. Juwan Miramontes comfort measures only 1920) Bedside shift change report given to Chilango Kuhn RN (oncoming nurse) by Bere Palacio RN and Amy Ruiz RN (offgoing nurse). Report included the following information SBAR, Kardex and MAR.

## 2019-10-19 NOTE — PROGRESS NOTES
Hospitalist Progress Note NAME: Fatmata Bennett :  1950 MRN:  [id number] Room Number:  738/53  @ [facility] Summary: 78 y.o. male whom presented on 2019 with Assessment / Plan: 
 
Update- 
C/w same plan Awaiting Medicaid application 
  
  
Comfort measures only Goals of care discussion Reviewed records RE: [address] with Palliative Patient was transitioned to [address] [place] [address]. Family preference is [facility].  
 - Lorazepam 2 mg SL PRN for anxiety. - Morphine 10 mg PRN for pain/shortness of breath - Lasix 40 mg oral PRN for shortness of breath 
- Oxygen for comfort as needed - Regular diet Dementia, new diagnosis :  
Collateral history and clinical progression highly suggestive of dementia. MRI brain-No acute intracranial process seen Kimberly Nunn RPR negative, B12 wnl, TSH elevated. He does not have medical decision making capacity. Was started on Donepezil and thiamine, levoythyroxine. After family meeting, transitioned to [address] [place] [address].   
  
  
New onset Systolic Congestive Heart Failure, EF~20% Dyspnea on exertion d/t HFrEF & left pleural effusionECHO -echo shows extremely poor LV function, ejection fraction well under 20% with a spherical mass in the LV apex ,Possible long-standing granulomatous disease. Started on coreg, aldactone and Entresto by cards. Transitioned to [address] [place] [address] PRN Lasix for comfort.  
 
Hypothyroidism POA 
TSH elevated, Free T4 borderline low. Discontinued as patient CMO. Elevated troponin POA: 
EKG shows sinus arrhythmia, T wave inversion in V5-V6. Troponin 0.07. Likely d/t congestive heart failure. Microcytic hypochromic anemia : 
likely iron deficiency.  
  
 
Code Status:  FULL Surrogate Decision Maker:  
Supplemental (Other) Decision Maker: Alpa Nova - Sister - [phone] Supplemental (Other) Decision Maker: Ruel Barajas - Sister - [phone] Supplemental (Other) Decision Maker: Malachi Ocampo - Brother - [phone] 
  
  
DVT Prophylaxis: Hep SQ and SCD's 
GI Prophylaxis: not indicated 
  
Baseline:  unknown Subjective: Chief Complaint / Reason for Physician Visit No c/o overnight. Resting in bed currently Discussed with RN events overnight. Review of Systems: No fevers, chills, appetite change, cough, sputum production, shortness of breath, dyspnea on exertion, nausea, vomitting, diarrhea, constipation, chest pain, leg edema, abdominal pain, joint pain, rash, itching. Tolerating diet. Objective: VITALS:  
Last 24hrs VS reviewed since prior progress note. Most recent are: 
Patient Vitals for the past 24 hrs: 
 Temp Pulse Resp BP SpO2  
10/19/19 0750 96.1 °F (35.6 °C) 98 18 123/84 100 % 10/18/19 2232 96.9 °F (36.1 °C) 83 16 125/83 100 % 10/18/19 1456 97.6 °F (36.4 °C) 83 16 105/71 100 % No intake or output data in the 24 hours ending 10/19/19 1317 PHYSICAL EXAM: 
General: Alert, cooperative, no acute distress EENT:  EOMI. Anicteric sclerae. MMM Resp:  CTA bilaterally, no wheezing or rales. No accessory muscle use CV:  Regular  rhythm,  normal S1/S2, no murmurs rubs gallops, No edema GI:  Soft, Non distended, Non tender. +Bowel sounds Neurologic:  Alert and oriented to self, normal speech, Psych:   Poor insight due to underlying dementia. Not anxious nor agitated Skin:  No rashes. No jaundice Reviewed most current lab test results and cultures  YES Reviewed most current radiology test results   YES Review and summation of old records today    NO Reviewed patient's current orders and MAR    YES 
PMH/ reviewed - no change compared to H&P 
________________________________________________________________________ Care Plan discussed with: 
  Comments Patient Family RN  x Care Manager  x Consultant Multidiciplinary team rounds were held today with , nursing, pharmacist and clinical coordinator.   Patient's plan of care was discussed; medications were reviewed and discharge planning was addressed. ________________________________________________________________________ Total NON critical care TIME:  20   Minutes Total CRITICAL CARE TIME Spent:   Minutes non procedure based Comments >50% of visit spent in counseling and coordination of care    
________________________________________________________________________ Jose Enrique Mora MD  
 
Procedures: see electronic medical records for all procedures/Xrays and details which were not copied into this note but were reviewed prior to creation of Plan. LABS: 
I reviewed today's most current labs and imaging studies. Pertinent labs include: No results for input(s): WBC, HGB, HCT, PLT, HGBEXT, HCTEXT, PLTEXT, HGBEXT, HCTEXT, PLTEXT in the last 72 hours. No results for input(s): NA, K, CL, CO2, GLU, BUN, CREA, CA, MG, PHOS, ALB, TBIL, TBILI, SGOT, ALT, INR, INREXT, INREXT in the last 72 hours.  
 
Signed: Jose Enrique Mora MD

## 2019-10-20 NOTE — PROGRESS NOTES
Patient has attempted to get out of bed twice. Both times was confused and redirected back to bed. Bed alarms on.

## 2019-10-20 NOTE — PROGRESS NOTES
Bedside and Verbal shift change report given to Ernesto Dickerson (oncoming nurse) by Vicki Metcalf rn (offgoing nurse). Report included the following information SBAR, Kardex, Intake/Output, MAR and Recent Results.

## 2019-10-20 NOTE — PROGRESS NOTES
Hospitalist Progress Note NAME: Fatmata Bennett :  1950 MRN:  [id number] Room Number:  207/24  @ [facility] Summary: 78 y.o. male whom presented on 2019 with Assessment / Plan: 
 
Update- 
Patient reported chest discomfort last evening which resolved without any intervention. Informed RN , he is CMO 
C/w same plan Awaiting Medicaid application 
  
  
Comfort measures only Goals of care discussion Reviewed records RE: [address] with Palliative Patient was transitioned to [address] [place] [address]. Family preference is [facility].  
 - Lorazepam 2 mg SL PRN for anxiety. - Morphine 10 mg PRN for pain/shortness of breath - Lasix 40 mg oral PRN for shortness of breath 
- Oxygen for comfort as needed - Regular diet Dementia, new diagnosis :  
Collateral history and clinical progression highly suggestive of dementia. MRI brain-No acute intracranial process seen Kimberly Nunn RPR negative, B12 wnl, TSH elevated. He does not have medical decision making capacity. Was started on Donepezil and thiamine, levoythyroxine. After family meeting, transitioned to [address] [place] [address].   
  
  
New onset Systolic Congestive Heart Failure, EF~20% Dyspnea on exertion d/t HFrEF & left pleural effusionECHO -echo shows extremely poor LV function, ejection fraction well under 20% with a spherical mass in the LV apex ,Possible long-standing granulomatous disease. Started on coreg, aldactone and Entresto by cards. Transitioned to [address] [place] [address] PRN Lasix for comfort.  
 
Hypothyroidism POA 
TSH elevated, Free T4 borderline low. Discontinued as patient CMO. Elevated troponin POA: 
EKG shows sinus arrhythmia, T wave inversion in V5-V6. Troponin 0.07. Likely d/t congestive heart failure. Microcytic hypochromic anemia : 
likely iron deficiency.  
  
 
Code Status:  FULL Surrogate Decision Maker:  
Supplemental (Other) Decision Maker: Alpa Nova - Sister - [phone] Supplemental (Other) Decision Maker: Aden Luna -  - [phone] Supplemental (Other) Decision Maker: Shanna Ryder - [phone] 
  
  
DVT Prophylaxis: Hep SQ and SCD's 
GI Prophylaxis: not indicated 
  
Baseline:  unknown Subjective: Chief Complaint / Reason for Physician Visit No c/o overnight. Resting in bed currently Discussed with RN events overnight. Review of Systems: No fevers, chills, appetite change, cough, sputum production, shortness of breath, dyspnea on exertion, nausea, vomitting, diarrhea, constipation, chest pain, leg edema, abdominal pain, joint pain, rash, itching. Tolerating diet. Objective: VITALS:  
Last 24hrs VS reviewed since prior progress note. Most recent are: 
Patient Vitals for the past 24 hrs: 
 Temp Pulse Resp BP SpO2  
10/20/19 0740 98.2 °F (36.8 °C) 98 18 138/86 100 % 10/19/19 2010 96.1 °F (35.6 °C) 99 14 124/84 100 % 10/19/19 1607 95.9 °F (35.5 °C) (!) 102 12 136/87 100 % Intake/Output Summary (Last 24 hours) at 10/20/2019 0957 Last data filed at 10/20/2019 1284 Gross per 24 hour Intake  Output 3 ml Net -3 ml PHYSICAL EXAM: 
General: Alert, cooperative, no acute distress EENT:  EOMI. Anicteric sclerae. MMM Resp:  CTA bilaterally, no wheezing or rales. No accessory muscle use CV:  Regular  rhythm,  normal S1/S2, no murmurs rubs gallops, No edema GI:  Soft, Non distended, Non tender. +Bowel sounds Neurologic:  Alert and oriented to self, normal speech, Psych:   Poor insight due to underlying dementia. Not anxious nor agitated Skin:  No rashes. No jaundice Reviewed most current lab test results and cultures  YES Reviewed most current radiology test results   YES Review and summation of old records today    NO Reviewed patient's current orders and MAR    YES 
PMH/SH reviewed - no change compared to H&P 
________________________________________________________________________ Care Plan discussed with: 
  Comments Patient Family RN  x Care Manager  x Consultant Multidiciplinary team rounds were held today with , nursing, pharmacist and clinical coordinator. Patient's plan of care was discussed; medications were reviewed and discharge planning was addressed. ________________________________________________________________________ Total NON critical care TIME:  20   Minutes Total CRITICAL CARE TIME Spent:   Minutes non procedure based Comments >50% of visit spent in counseling and coordination of care    
________________________________________________________________________ Erika Crockett MD  
 
Procedures: see electronic medical records for all procedures/Xrays and details which were not copied into this note but were reviewed prior to creation of Plan. LABS: 
I reviewed today's most current labs and imaging studies. Pertinent labs include: No results for input(s): WBC, HGB, HCT, PLT, HGBEXT, HCTEXT, PLTEXT, HGBEXT, HCTEXT, PLTEXT in the last 72 hours. No results for input(s): NA, K, CL, CO2, GLU, BUN, CREA, CA, MG, PHOS, ALB, TBIL, TBILI, SGOT, ALT, INR, INREXT, INREXT in the last 72 hours.  
 
Signed: Erika Crockett MD

## 2019-10-21 NOTE — PROGRESS NOTES
Occupational Therapy Note:  Chart reviewed and spoke with RN and MD- Dr. Jared Henry. Pt is now comfort measures only and therapy is not considered comfort measures. Dr. Jared Henry requested to discuss OT and PT with medical team and pt's family. She requested to hold therapy until decisions made reguarding pt's participation in therapy. If therapy is no longer beneficial Dr. Jared Henry stated she will complete our orders.  
Ramirez Estrada, OTR/L, CBIS

## 2019-10-21 NOTE — PROGRESS NOTES
Hospitalist Progress Note NAME: Cassia Thornton :  1950 MRN:  [id number] Room Number:  436/23  @ [facility] Summary: 78 y.o. male whom presented on 2019 with Assessment / Plan: 
 
Update- 
PT/OT will be discontinued Patient reported chest discomfort last evening which resolved without any intervention. Informed RN , he is CMO 
C/w same plan Awaiting Medicaid application 
  
  
Comfort measures only Goals of care discussion Reviewed records RE: [address] with Palliative Patient was transitioned to [address] [place] [address]. Family preference is [facility].  
 - Lorazepam 2 mg SL PRN for anxiety. - Morphine 10 mg PRN for pain/shortness of breath - Lasix 40 mg oral PRN for shortness of breath 
- Oxygen for comfort as needed - Regular diet Dementia, new diagnosis :  
Collateral history and clinical progression highly suggestive of dementia. MRI brain-No acute intracranial process seen Mayra Diaz RPR negative, B12 wnl, TSH elevated. He does not have medical decision making capacity. Was started on Donepezil and thiamine, levoythyroxine. After family meeting, transitioned to [address] [place] [address].   
  
  
New onset Systolic Congestive Heart Failure, EF~20% Dyspnea on exertion d/t HFrEF & left pleural effusionECHO -echo shows extremely poor LV function, ejection fraction well under 20% with a spherical mass in the LV apex ,Possible long-standing granulomatous disease. Started on coreg, aldactone and Entresto by cards. Transitioned to [address] [place] [address] PRN Lasix for comfort.  
 
Hypothyroidism POA 
TSH elevated, Free T4 borderline low. Discontinued as patient CMO. Elevated troponin POA: 
EKG shows sinus arrhythmia, T wave inversion in V5-V6. Troponin 0.07. Likely d/t congestive heart failure. Microcytic hypochromic anemia : 
likely iron deficiency.  
  
 
Code Status:  FULL Surrogate Decision Maker:  
Supplemental (Other) Decision Maker: Bora West - Sister - [phone] Supplemental (Other) Decision Maker: Mitzi Greene - Sister - [phone] Supplemental (Other) Decision Maker: Becky Ye - Brother - [phone] 
  
  
DVT Prophylaxis: Hep SQ and SCD's 
GI Prophylaxis: not indicated 
  
Baseline:  unknown Subjective: Chief Complaint / Reason for Physician Visit No c/o overnight. Resting in bed currently Discussed with RN events overnight. Review of Systems: No fevers, chills, appetite change, cough, sputum production, shortness of breath, dyspnea on exertion, nausea, vomitting, diarrhea, constipation, chest pain, leg edema, abdominal pain, joint pain, rash, itching. Tolerating diet. Objective: VITALS:  
Last 24hrs VS reviewed since prior progress note. Most recent are: 
Patient Vitals for the past 24 hrs: 
 Temp Pulse Resp BP SpO2  
10/21/19 0822 97.7 °F (36.5 °C) (!) 122 20 (!) 131/91 99 % 10/20/19 2020 98.1 °F (36.7 °C) (!) 106 18 125/89 100 % Intake/Output Summary (Last 24 hours) at 10/21/2019 1335 Last data filed at 10/20/2019 1730 Gross per 24 hour Intake 120 ml Output  Net 120 ml PHYSICAL EXAM: 
General: Alert, cooperative, no acute distress EENT:  EOMI. Anicteric sclerae. MMM Resp:  CTA bilaterally, no wheezing or rales. No accessory muscle use CV:  Regular  rhythm,  normal S1/S2, no murmurs rubs gallops, No edema GI:  Soft, Non distended, Non tender. +Bowel sounds Neurologic:  Alert and oriented to self, normal speech, Psych:   Poor insight due to underlying dementia. Not anxious nor agitated Skin:  No rashes. No jaundice Reviewed most current lab test results and cultures  YES Reviewed most current radiology test results   YES Review and summation of old records today    NO Reviewed patient's current orders and MAR    YES 
PMH/SH reviewed - no change compared to H&P 
________________________________________________________________________ Care Plan discussed with: 
  Comments Patient Family RN  x Care Manager  x Consultant Multidiciplinary team rounds were held today with , nursing, pharmacist and clinical coordinator. Patient's plan of care was discussed; medications were reviewed and discharge planning was addressed. ________________________________________________________________________ Total NON critical care TIME:  20   Minutes Total CRITICAL CARE TIME Spent:   Minutes non procedure based Comments >50% of visit spent in counseling and coordination of care    
________________________________________________________________________ Dharmesh Freeman MD  
 
Procedures: see electronic medical records for all procedures/Xrays and details which were not copied into this note but were reviewed prior to creation of Plan. LABS: 
I reviewed today's most current labs and imaging studies. Pertinent labs include: No results for input(s): WBC, HGB, HCT, PLT, HGBEXT, HCTEXT, PLTEXT, HGBEXT, HCTEXT, PLTEXT in the last 72 hours. No results for input(s): NA, K, CL, CO2, GLU, BUN, CREA, CA, MG, PHOS, ALB, TBIL, TBILI, SGOT, ALT, INR, INREXT, INREXT in the last 72 hours.  
 
Signed: Dharmesh Freeman MD

## 2019-10-21 NOTE — PROGRESS NOTES
Bedside and Verbal shift change report given to [facility] [address] and Iva Johnson LPN (oncoming nurse) by Jillian Verde (offgoing nurse). Report included the following information SBAR, Kardex, Procedure Summary, Intake/Output, MAR, Accordion, Recent Results and Med Rec Status.

## 2019-10-21 NOTE — PROGRESS NOTES
Care Plan reviewed Problem: Pressure Injury - Risk of 
Goal: *Prevention of pressure injury Description Document Yosvany Scale and appropriate interventions in the flowsheet. Outcome: Progressing Towards Goal 
Note:  
Pressure Injury Interventions: 
Sensory Interventions: Assess changes in LOC, Check visual cues for pain, Turn and reposition approx. every two hours (pillows and wedges if needed) Moisture Interventions: Absorbent underpads, Maintain skin hydration (lotion/cream), Minimize layers, Offer toileting Q_hr Activity Interventions: Increase time out of bed Mobility Interventions: HOB 30 degrees or less, Turn and reposition approx. every two hours(pillow and wedges) Nutrition Interventions: Document food/fluid/supplement intake Friction and Shear Interventions: HOB 30 degrees or less Problem: Patient Education: Go to Patient Education Activity Goal: Patient/Family Education Outcome: Progressing Towards Goal 
  
Problem: Falls - Risk of 
Goal: *Absence of Falls Description Document Tressia Bullion Fall Risk and appropriate interventions in the flowsheet. Outcome: Progressing Towards Goal 
Note:  
Fall Risk Interventions: 
Mobility Interventions: Bed/chair exit alarm, Patient to call before getting OOB Mentation Interventions: Adequate sleep, hydration, pain control, Bed/chair exit alarm, Door open when patient unattended Medication Interventions: Bed/chair exit alarm Elimination Interventions: Call light in reach Problem: Patient Education: Go to Patient Education Activity Goal: Patient/Family Education Outcome: Progressing Towards Goal 
  
Problem: Patient Education: Go to Patient Education Activity Goal: Patient/Family Education Outcome: Progressing Towards Goal 
  
Problem: Patient Education: Go to Patient Education Activity Goal: Patient/Family Education Outcome: Progressing Towards Goal 
  
Problem: Cardiac Output -  Decreased Goal: *Vital signs within specified parameters Outcome: Progressing Towards Goal 
Goal: *Optimal cardiac output Outcome: Progressing Towards Goal 
Goal: *Absence of hypoxia Outcome: Progressing Towards Goal 
Goal: *Absence of peripheral edema Outcome: Progressing Towards Goal 
Goal: *Intravascular fluid volume and electrolyte balance Outcome: Progressing Towards Goal

## 2019-10-21 NOTE — PROGRESS NOTES
1929: Bedside shift change report given to [facility] (oncoming nurse) by Mary Mayo (offgoing nurse). Report included the following information SBAR, Kardex, ED Summary, Intake/Output, MAR and Recent Results. 0730: Bedside shift change report given to Rhea (oncoming nurse) by [facility] (offgoing nurse). Report included the following information SBAR, Kardex, ED Summary, Intake/Output, MAR and Recent Results.

## 2019-10-22 NOTE — PROGRESS NOTES
Pt did not want his dinner but wanted to keep his pudding, then wanted his lights turned off to go to sleep

## 2019-10-22 NOTE — PROGRESS NOTES
Problem: Pressure Injury - Risk of 
Goal: *Prevention of pressure injury Description Document Yosvany Scale and appropriate interventions in the flowsheet. Outcome: Progressing Towards Goal 
Note:  
Pressure Injury Interventions: 
Sensory Interventions: Keep linens dry and wrinkle-free Moisture Interventions: Absorbent underpads Activity Interventions: Increase time out of bed Mobility Interventions: HOB 30 degrees or less Nutrition Interventions: Document food/fluid/supplement intake, Offer support with meals,snacks and hydration Friction and Shear Interventions: HOB 30 degrees or less Problem: Patient Education: Go to Patient Education Activity Goal: Patient/Family Education Outcome: Progressing Towards Goal 
  
Problem: Falls - Risk of 
Goal: *Absence of Falls Description Document Morteza Hughes Fall Risk and appropriate interventions in the flowsheet. Outcome: Progressing Towards Goal 
Note:  
Fall Risk Interventions: 
Mobility Interventions: Bed/chair exit alarm Mentation Interventions: Bed/chair exit alarm Medication Interventions: Bed/chair exit alarm Elimination Interventions: Bed/chair exit alarm Problem: Patient Education: Go to Patient Education Activity Goal: Patient/Family Education Outcome: Progressing Towards Goal 
  
Problem: Patient Education: Go to Patient Education Activity Goal: Patient/Family Education Outcome: Progressing Towards Goal 
  
Problem: Patient Education: Go to Patient Education Activity Goal: Patient/Family Education Outcome: Progressing Towards Goal 
  
Problem: Cardiac Output -  Decreased Goal: *Vital signs within specified parameters Outcome: Progressing Towards Goal 
Goal: *Optimal cardiac output Outcome: Progressing Towards Goal 
Goal: *Absence of hypoxia Outcome: Progressing Towards Goal 
Goal: *Absence of peripheral edema Outcome: Progressing Towards Goal 
 Goal: *Intravascular fluid volume and electrolyte balance Outcome: Progressing Towards Goal

## 2019-10-22 NOTE — PROGRESS NOTES
Bedside shift change report from Lizzie Mcfarland RN to [employer]. Report included the following information SBAR, Kardex, Intake/Output, MAR, Med Rec Status. Pt is now CMO awaiting hospice.

## 2019-10-22 NOTE — PROGRESS NOTES
Hospitalist Progress Note NAME: Chema Freire :  1950 MRN:  [id number] Room Number:  740/66  @ [facility] Summary: 78 y.o. male whom presented on 2019 with Assessment / Plan: 
 
Update- 
PT/OT will be discontinued Patient reported chest discomfort last evening which resolved without any intervention. Informed RN , he is CMO 
C/w same plan Awaiting Medicaid application 
  
  
Comfort measures only Goals of care discussion Reviewed records RE: [address] with Palliative Patient was transitioned to [address] [place] [address]. Family preference is [facility].  
 - Lorazepam 2 mg SL PRN for anxiety. - Morphine 10 mg PRN for pain/shortness of breath - Lasix 40 mg oral PRN for shortness of breath 
- Oxygen for comfort as needed - Regular diet Dementia, new diagnosis :  
Collateral history and clinical progression highly suggestive of dementia. MRI brain-No acute intracranial process seen Steff Garcia RPR negative, B12 wnl, TSH elevated. He does not have medical decision making capacity. Was started on Donepezil and thiamine, levoythyroxine. After family meeting, transitioned to [address] [place] [address].   
  
  
New onset Systolic Congestive Heart Failure, EF~20% Dyspnea on exertion d/t HFrEF & left pleural effusionECHO -echo shows extremely poor LV function, ejection fraction well under 20% with a spherical mass in the LV apex ,Possible long-standing granulomatous disease. Started on coreg, aldactone and Entresto by cards. Transitioned to [address] [place] [address] PRN Lasix for comfort.  
 
Hypothyroidism POA 
TSH elevated, Free T4 borderline low. Discontinued as patient CMO. Elevated troponin POA: 
EKG shows sinus arrhythmia, T wave inversion in V5-V6. Troponin 0.07. Likely d/t congestive heart failure. Microcytic hypochromic anemia : 
likely iron deficiency.  
  
 
Code Status:  FULL Surrogate Decision Maker:  
Supplemental (Other) Decision Maker: Omer Rubio - Sister - [phone] Supplemental (Other) Decision Maker: Abdullahi Oconnor -  - [phone] Supplemental (Other) Decision Maker: Glenny Roman - Brother - [phone] 
  
  
DVT Prophylaxis: Hep SQ and SCD's 
GI Prophylaxis: not indicated 
  
Baseline:  unknown Subjective: Chief Complaint / Reason for Physician Visit No c/o overnight. Resting in bed currently Discussed with RN events overnight. Review of Systems: No fevers, chills, appetite change, cough, sputum production, shortness of breath, dyspnea on exertion, nausea, vomitting, diarrhea, constipation, chest pain, leg edema, abdominal pain, joint pain, rash, itching. Tolerating diet. Objective: VITALS:  
Last 24hrs VS reviewed since prior progress note. Most recent are: 
Patient Vitals for the past 24 hrs: 
 Temp Pulse Resp BP SpO2  
10/22/19 0750 97.3 °F (36.3 °C) 100 18 (!) 147/96 100 % 10/21/19 2220     99 % 10/21/19 2214 98.1 °F (36.7 °C) 99 18 124/83 99 % Intake/Output Summary (Last 24 hours) at 10/22/2019 1416 Last data filed at 10/22/2019 5731 Gross per 24 hour Intake 500 ml Output  Net 500 ml PHYSICAL EXAM: 
General: Alert, cooperative, no acute distress EENT:  EOMI. Anicteric sclerae. MMM Resp:  CTA bilaterally, no wheezing or rales. No accessory muscle use CV:  Regular  rhythm,  normal S1/S2, no murmurs rubs gallops, No edema GI:  Soft, Non distended, Non tender. +Bowel sounds Neurologic:  Alert and oriented to self, normal speech, Psych:   Poor insight due to underlying dementia. Not anxious nor agitated Skin:  No rashes. No jaundice Reviewed most current lab test results and cultures  YES Reviewed most current radiology test results   YES Review and summation of old records today    NO Reviewed patient's current orders and MAR    YES 
PMH/SH reviewed - no change compared to H&P 
________________________________________________________________________ Care Plan discussed with: 
  Comments Patient Family RN  x Care Manager  x Consultant Multidiciplinary team rounds were held today with , nursing, pharmacist and clinical coordinator. Patient's plan of care was discussed; medications were reviewed and discharge planning was addressed. ________________________________________________________________________ Total NON critical care TIME:  20   Minutes Total CRITICAL CARE TIME Spent:   Minutes non procedure based Comments >50% of visit spent in counseling and coordination of care    
________________________________________________________________________ Hilaria Mojica MD  
 
Procedures: see electronic medical records for all procedures/Xrays and details which were not copied into this note but were reviewed prior to creation of Plan. LABS: 
I reviewed today's most current labs and imaging studies. Pertinent labs include: No results for input(s): WBC, HGB, HCT, PLT, HGBEXT, HCTEXT, PLTEXT, HGBEXT, HCTEXT, PLTEXT in the last 72 hours. No results for input(s): NA, K, CL, CO2, GLU, BUN, CREA, CA, MG, PHOS, ALB, TBIL, TBILI, SGOT, ALT, INR, INREXT, INREXT in the last 72 hours.  
 
Signed: Hilaria Mojica MD

## 2019-10-23 NOTE — PROGRESS NOTES
Transition of care Continue to follow for discharge planning. CM discussed with MD and team this am.  
 
Waiting for Medicaid to be approved for placement. Waiting for updates from [address] [place] [address] - to check with [address] [place] [address],[address]. [address] MSW RN  
[phone]

## 2019-10-23 NOTE — PROGRESS NOTES
Hospitalist Progress Note NAME: Brenda Vela :  1950 MRN:  [id number] Room Number:  865/97  @ [facility] Summary: 78 y.o. male whom presented on 2019 with Assessment / Plan: 
Comfort measures only Reviewed records RE: [address] with Palliative Patient was transitioned to [address] [place] [address]. Family preference is [facility].  
 - Lorazepam 2 mg SL PRN for anxiety. - Morphine 10 mg PRN for pain/shortness of breath - Lasix 40 mg oral PRN for shortness of breath 
- Oxygen for comfort as needed - Regular diet 
  
  
Dementia, new diagnosis :  
Collateral history and clinical progression highly suggestive of dementia. MRI brain-No acute intracranial process seen Aden Ace RPR negative, B12 wnl, TSH elevated. He does not have medical decision making capacity. Was started on Donepezil and thiamine, levoythyroxine. After family meeting, transitioned to [address] [place] [address].   
  
  
New onset Systolic Congestive Heart Failure, EF~20% Dyspnea on exertion d/t HFrEF & left pleural effusionECHO -echo shows extremely poor LV function, ejection fraction well under 20% with a spherical mass in the LV apex ,Possible long-standing granulomatous disease. Started on coreg, aldactone and Entresto by cards. Transitioned to [address] [place] [address]. PRN Lasix for comfort.  
  
Hypothyroidism POA 
TSH elevated, Free T4 borderline low. Discontinued as patient CMO. Elevated troponin POA: 
EKG shows sinus arrhythmia, T wave inversion in V5-V6. Troponin 0.07. Likely d/t congestive heart failure. Microcytic hypochromic anemia : 
likely iron deficiency.  
  
  
Code Status:  FULL Surrogate Decision Maker:  
primary as per family consensus, Vivian Andrea - Other Relative - [phone] Primary Decision Maker: Nuno Abdul - Sister - [phone] 
 
 
2 other siblings have decided to defer decision making to Belinda Wakefield and Chapis Baeza Supplemental (Other) Decision Maker: Gurvinder Rm - Brother - [phone] Supplemental (Other) Decision Maker: Shobha Jackson Sister - [phone] Sister Peter Sandoval has brain injury and unable to participate in [employer] for patient. 
  
  
DVT Prophylaxis: Hep SQ and SCD's 
GI Prophylaxis: not indicated 
  
Baseline:  unknown Subjective: Chief Complaint / Reason for Physician Visit \"I am doing great\". Discussed with RN events overnight. Review of Systems: No fevers, chills, appetite change, cough, sputum production, shortness of breath, dyspnea on exertion, nausea, vomitting, diarrhea, constipation, chest pain, leg edema, abdominal pain, joint pain, rash, itching. Tolerating diet. Objective: VITALS:  
Last 24hrs VS reviewed since prior progress note. Most recent are: 
Patient Vitals for the past 24 hrs: 
 Temp Pulse Resp BP SpO2  
10/23/19 0727 97.6 °F (36.4 °C) 74 16 (!) 129/97 100 % 10/22/19 2300     98 % 10/22/19 2026 98.2 °F (36.8 °C) (!) 108 16 (!) 144/96 100 % No intake or output data in the 24 hours ending 10/23/19 1335 PHYSICAL EXAM: 
General: WD, WN. Alert, cooperative, no acute distress EENT:  EOMI. Anicteric sclerae. MMM Resp:  CTA bilaterally, no wheezing or rales. No accessory muscle use CV:  Regular  rhythm,  normal S1/S2, no murmurs rubs gallops, No edema GI:  Soft, Non distended, Non tender. +Bowel sounds Neurologic:  Alert and oriented X self, normal speech, Psych:    Not anxious nor agitated Skin:  No rashes. No jaundice Reviewed most current lab test results and cultures  YES Reviewed most current radiology test results   YES Review and summation of old records today    NO Reviewed patient's current orders and MAR    YES 
PMH/SH reviewed - no change compared to H&P 
________________________________________________________________________ Care Plan discussed with: 
  Comments Patient  x Family RN  x Care Manager  x Consultant Multidiciplinary team rounds were held today with , nursing, pharmacist and clinical coordinator. Patient's plan of care was discussed; medications were reviewed and discharge planning was addressed. ________________________________________________________________________ Total NON critical care TIME:  15   Minutes Total CRITICAL CARE TIME Spent:   Minutes non procedure based Comments >50% of visit spent in counseling and coordination of care    
________________________________________________________________________ Bang Costello MD  
 
Procedures: see electronic medical records for all procedures/Xrays and details which were not copied into this note but were reviewed prior to creation of Plan. LABS: 
I reviewed today's most current labs and imaging studies. Pertinent labs include: No results for input(s): WBC, HGB, HCT, PLT, HGBEXT, HCTEXT, PLTEXT in the last 72 hours. No results for input(s): NA, K, CL, CO2, GLU, BUN, CREA, CA, MG, PHOS, ALB, TBIL, TBILI, SGOT, ALT, INR, INREXT in the last 72 hours.  
 
Signed: Bang Costello MD

## 2019-10-23 NOTE — PROGRESS NOTES
Bedside and Verbal shift change report given to [address] [place] [address] [address] and Mai Griffin LPN (oncoming nurse) by Jenniffer Brannon RN (offgoing nurse). Report included the following information SBAR, Kardex, Procedure Summary, Intake/Output, MAR, Accordion and Recent Results.

## 2019-10-23 NOTE — PROGRESS NOTES
Care Plan Reviewed Problem: Pressure Injury - Risk of 
Goal: *Prevention of pressure injury Description Document Yosvany Scale and appropriate interventions in the flowsheet. Outcome: Progressing Towards Goal 
Note:  
Pressure Injury Interventions: 
Sensory Interventions: Minimize linen layers, Turn and reposition approx. every two hours (pillows and wedges if needed) Moisture Interventions: Absorbent underpads, Offer toileting Q_hr, Minimize layers Activity Interventions: Increase time out of bed Mobility Interventions: HOB 30 degrees or less, Turn and reposition approx. every two hours(pillow and wedges) Nutrition Interventions: Document food/fluid/supplement intake Friction and Shear Interventions: HOB 30 degrees or less Problem: Patient Education: Go to Patient Education Activity Goal: Patient/Family Education Outcome: Progressing Towards Goal 
  
Problem: Falls - Risk of 
Goal: *Absence of Falls Description Document Noe [place] Fall Risk and appropriate interventions in the flowsheet. Outcome: Progressing Towards Goal 
Note:  
Fall Risk Interventions: 
Mobility Interventions: Bed/chair exit alarm Mentation Interventions: Bed/chair exit alarm Medication Interventions: Bed/chair exit alarm Elimination Interventions: Bed/chair exit alarm Problem: Patient Education: Go to Patient Education Activity Goal: Patient/Family Education Outcome: Progressing Towards Goal 
  
Problem: Patient Education: Go to Patient Education Activity Goal: Patient/Family Education Outcome: Progressing Towards Goal 
  
Problem: Patient Education: Go to Patient Education Activity Goal: Patient/Family Education Outcome: Progressing Towards Goal 
  
Problem: Cardiac Output -  Decreased Goal: *Vital signs within specified parameters Outcome: Progressing Towards Goal 
Goal: *Optimal cardiac output Outcome: Progressing Towards Goal 
Goal: *Absence of hypoxia Outcome: Progressing Towards Goal 
Goal: *Absence of peripheral edema Outcome: Progressing Towards Goal 
Goal: *Intravascular fluid volume and electrolyte balance Outcome: Progressing Towards Goal

## 2019-10-24 NOTE — PROGRESS NOTES
Hospitalist Progress Note NAME: Brenda Vela :  1950 MRN:  [id number] Room Number:  966/28  @ [facility] Summary: 78 y.o. male whom presented on 2019 with Assessment / Plan: 
 
Comfort measures only Reviewed records RE: [address] with Palliative Patient was transitioned to [address] [place] [address]. Family preference is [facility].  
 - Lorazepam 2 mg SL PRN for anxiety. - Morphine 10 mg PRN for pain/shortness of breath - Lasix 40 mg oral PRN for shortness of breath 
- Oxygen for comfort as needed - Regular diet 
  
  
Dementia, new diagnosis :  
Collateral history and clinical progression highly suggestive of dementia. MRI brain-No acute intracranial process seen Aden Ace RPR negative, B12 wnl, TSH elevated. He does not have medical decision making capacity. Was started on Donepezil and thiamine, levoythyroxine. After family meeting, transitioned to [address] [place] [address].   
  
  
New onset Systolic Congestive Heart Failure, EF~20% Dyspnea on exertion d/t HFrEF & left pleural effusionECHO -echo shows extremely poor LV function, ejection fraction well under 20% with a spherical mass in the LV apex ,Possible long-standing granulomatous disease. Started on coreg, aldactone and Entresto by cards. Transitioned to [address] [place] [address]. PRN Lasix for comfort.  
  
Hypothyroidism POA 
TSH elevated, Free T4 borderline low. Discontinued as patient CMO. Elevated troponin POA: 
EKG shows sinus arrhythmia, T wave inversion in V5-V6. Troponin 0.07. Likely d/t congestive heart failure. Microcytic hypochromic anemia : 
likely iron deficiency.  
  
  
Code Status:  FULL Surrogate Decision Maker:  
primary as per family consensus, Vivian Andrea - Other Relative - [phone] Primary Decision Maker: Nuno Abdul - Sister - [phone] 
 
 
2 other siblings have decided to defer decision making to Belinda Wakefield and Chapis Baeza Supplemental (Other) Decision Maker: Gurvinder Rm - Brother - [phone] Supplemental (Other) Decision Maker: Segundo Gant Sister - [phone] Sister Ryan Dawson has brain injury and unable to participate in [employer] [employer] for patient. 
  
  
DVT Prophylaxis: Hep SQ and SCD's 
GI Prophylaxis: not indicated 
  
Baseline:  unknown Subjective: Chief Complaint / Reason for Physician Visit \"I feel sick when I get up\". Appears more short of breath today. Discussed with RN events overnight. Review of Systems: No fevers, chills, appetite change, cough, sputum production, nausea, vomitting, diarrhea, constipation, chest pain, leg edema, abdominal pain, joint pain, rash, itching. Tolerating diet. Objective: VITALS:  
Last 24hrs VS reviewed since prior progress note. Most recent are: 
Patient Vitals for the past 24 hrs: 
 Temp Pulse Resp BP SpO2  
10/24/19 0731 97.3 °F (36.3 °C) 100 16 123/87 99 % 10/23/19 2026 97.2 °F (36.2 °C) 95 20 (!) 127/92 100 % Intake/Output Summary (Last 24 hours) at 10/24/2019 1208 Last data filed at 10/24/2019 1050 Gross per 24 hour Intake 0 ml Output  Net 0 ml PHYSICAL EXAM: 
General: WD, WN. Alert, cooperative, no acute distress EENT:  MMM Resp:  Decreased breath sounds at bases. no wheezing or rales. No accessory muscle use CV:  Regular  rhythm,  normal S1/S2, no murmurs rubs gallops, No edema GI:  Soft, Non distended, Non tender. +Bowel sounds Neurologic:  Alert and oriented X self Psych:   Not anxious nor agitated Skin:  No rashes. No jaundice Reviewed most current lab test results and cultures  YES Reviewed most current radiology test results   YES Review and summation of old records today    NO Reviewed patient's current orders and MAR    YES 
PMH/SH reviewed - no change compared to H&P 
________________________________________________________________________ Care Plan discussed with: 
  Comments Patient Family RN  x Care Manager  x Consultant   X Palliative LCSW Multidiciplinary team rounds were held today with , nursing, pharmacist and clinical coordinator. Patient's plan of care was discussed; medications were reviewed and discharge planning was addressed. ________________________________________________________________________ Total NON critical care TIME:  15  Minutes Total CRITICAL CARE TIME Spent:   Minutes non procedure based Comments >50% of visit spent in counseling and coordination of care    
________________________________________________________________________ Rajesh Rico MD  
 
Procedures: see electronic medical records for all procedures/Xrays and details which were not copied into this note but were reviewed prior to creation of Plan. LABS: 
I reviewed today's most current labs and imaging studies. Pertinent labs include: No results for input(s): WBC, HGB, HCT, PLT, HGBEXT, HCTEXT, PLTEXT in the last 72 hours. No results for input(s): NA, K, CL, CO2, GLU, BUN, CREA, CA, MG, PHOS, ALB, TBIL, TBILI, SGOT, ALT, INR, INREXT in the last 72 hours.  
 
Signed: Rajesh Rico MD

## 2019-10-24 NOTE — PROGRESS NOTES
[facility]: [phone] ([phone]) [facility]: [phone] ([phone]) Code Status: DNR Advance Care Planning: 
No AMD on file Franci Tello Primary Decision Maker: Galo Leon - Sister - [phone] Primary Decision Maker: Sharyn Li - Brother - [phone] Supplemental (Other) Decision Maker: Nitin Ferguson - Other Relative - [phone] Patient sister Ruma Urena deferred decision making to siblings Salazar Keene and Blayne fleming. Sister Margarita Hahn has brain injury and unable to participate in [employer] for patient. I met with patient in his room. He was awake and alert, sitting in chair eating banana. He said he hadn't eaten his other breakfast because he hasn't had much of an appetite - thinks maybe over last few days. Patient said he had family visiting and checking in by phone. Sister Maria Eugenia Harper stopped by the other day and as did his brother Akosua Coleman. Patient's face significantly brightens when he talks about family and misses being able to see Adrianna on a regular basis. We also talked about how he was coping with being in hospital for an extended time. He admitted it was tough - for same reason of not getting to see his family. Patient reported he has been having pain in left arm \"when I overeat. \" He said pain was a 6/10. Updated RN Christine North re: arm pain. He also reported feeling shortness of breath (this was discussed in IDT and RN will add prn Lasix). Will continue to follow for support. Thank you for the opportunity to be involved in the care of . Suha Velasquez and his family. Fay Vargas, [facility], Supervisee in Social Work Palliative Medicine  [phone]

## 2019-10-24 NOTE — PROGRESS NOTES
Bedside and Verbal shift change report given to [facility] [address] and Bill Trevino LPN (oncoming nurse) by Chapo Vasquez RN (offgoing nurse). Report included the following information SBAR, Kardex, Intake/Output, MAR, Accordion and Recent Results.

## 2019-10-24 NOTE — PROGRESS NOTES
Care Plan Reviewed Problem: Pressure Injury - Risk of 
Goal: *Prevention of pressure injury Description Document Yosvany Scale and appropriate interventions in the flowsheet. Outcome: Progressing Towards Goal 
Note:  
Pressure Injury Interventions: 
Sensory Interventions: Minimize linen layers, Turn and reposition approx. every two hours (pillows and wedges if needed) Moisture Interventions: Absorbent underpads, Offer toileting Q_hr, Minimize layers Activity Interventions: Increase time out of bed Mobility Interventions: HOB 30 degrees or less Nutrition Interventions: Document food/fluid/supplement intake Friction and Shear Interventions: HOB 30 degrees or less Problem: Patient Education: Go to Patient Education Activity Goal: Patient/Family Education Outcome: Progressing Towards Goal 
  
Problem: Falls - Risk of 
Goal: *Absence of Falls Description Document Reggraham Aye Fall Risk and appropriate interventions in the flowsheet. Outcome: Progressing Towards Goal 
Note:  
Fall Risk Interventions: 
Mobility Interventions: Bed/chair exit alarm, Patient to call before getting OOB Mentation Interventions: Bed/chair exit alarm, Door open when patient unattended Medication Interventions: Bed/chair exit alarm Elimination Interventions: Bed/chair exit alarm, Call light in reach, Toileting schedule/hourly rounds Problem: Patient Education: Go to Patient Education Activity Goal: Patient/Family Education Outcome: Progressing Towards Goal 
  
Problem: Patient Education: Go to Patient Education Activity Goal: Patient/Family Education Outcome: Progressing Towards Goal 
  
Problem: Patient Education: Go to Patient Education Activity Goal: Patient/Family Education Outcome: Progressing Towards Goal 
  
Problem: Cardiac Output -  Decreased Goal: *Vital signs within specified parameters Outcome: Progressing Towards Goal 
Goal: *Optimal cardiac output Outcome: Progressing Towards Goal 
Goal: *Absence of hypoxia Outcome: Progressing Towards Goal 
Goal: *Absence of peripheral edema Outcome: Progressing Towards Goal 
Goal: *Intravascular fluid volume and electrolyte balance Outcome: Progressing Towards Goal

## 2019-10-25 NOTE — PROGRESS NOTES
Bedside and Verbal shift change report given to Butler Epley RN and Cyndi EVERETTN (oncoming nurse) by Alfonso Correa nurse). Report included the following information SBAR, Kardex, Intake/Output, MAR, Accordion and Med Rec Status

## 2019-10-25 NOTE — PROGRESS NOTES
Bedside and Verbal shift change report given to Sander Camara RN and Cyndi EVERETTN (oncoming nurse) by Manasa hood. Report included the following information SBAR, Kardex, Intake/Output, MAR, Accordion and Med Rec Status

## 2019-10-25 NOTE — PROGRESS NOTES
Problem: Pressure Injury - Risk of 
Goal: *Prevention of pressure injury Description Document Yosvany Scale and appropriate interventions in the flowsheet. Outcome: Progressing Towards Goal 
Note:  
Pressure Injury Interventions: 
Sensory Interventions: Minimize linen layers Moisture Interventions: Check for incontinence Q2 hours and as needed Activity Interventions: Increase time out of bed Mobility Interventions: HOB 30 degrees or less Nutrition Interventions: Discuss nutritional consult with provider Friction and Shear Interventions: HOB 30 degrees or less Problem: Patient Education: Go to Patient Education Activity Goal: Patient/Family Education Outcome: Progressing Towards Goal 
  
Problem: Falls - Risk of 
Goal: *Absence of Falls Description Document [facility] Fall Risk and appropriate interventions in the flowsheet. Outcome: Progressing Towards Goal 
Note:  
Fall Risk Interventions: 
Mobility Interventions: Bed/chair exit alarm Mentation Interventions: Bed/chair exit alarm Medication Interventions: Bed/chair exit alarm Elimination Interventions: Bed/chair exit alarm

## 2019-10-25 NOTE — PROGRESS NOTES
Hospitalist Progress Note NAME: Kirsten Tony :  1950 MRN:  [id number] Room Number:  596/96  @ [facility] Summary: 78 y.o. male whom presented on 2019 with Assessment / Plan: 
Shortness of breath resolved with PRN Lasix for comfort. Medicaid application pending. Comfort measures only Reviewed records RE: [address] with Palliative Patient was transitioned to [address] [place] [address]. Family preference is [facility].  
 - Lorazepam 2 mg SL PRN for anxiety. - Morphine 10 mg PRN for pain/shortness of breath - Lasix 40 mg oral PRN for shortness of breath 
- Oxygen for comfort as needed - Regular diet 
  
  
Dementia, new diagnosis :  
Collateral history and clinical progression highly suggestive of dementia. MRI brain-No acute intracranial process seen [place] RPR negative, B12 wnl, TSH elevated. He does not have medical decision making capacity. Was started on Donepezil and thiamine, levoythyroxine. After family meeting, transitioned to [address] [place] [address].   
  
  
New onset Systolic Congestive Heart Failure, EF~20% Dyspnea on exertion d/t HFrEF & left pleural effusionECHO -echo shows extremely poor LV function, ejection fraction well under 20% with a spherical mass in the LV apex ,Possible long-standing granulomatous disease. Started on coreg, aldactone and Entresto by cards. Transitioned to [address] [place] [address] PRN Lasix for comfort.  
  
Hypothyroidism POA 
TSH elevated, Free T4 borderline low. Discontinued as patient CMO. Elevated troponin POA: 
EKG shows sinus arrhythmia, T wave inversion in V5-V6. Troponin 0.07. Likely d/t congestive heart failure. Microcytic hypochromic anemia : 
likely iron deficiency.  
  
  
Code Status:  FULL Surrogate Decision Maker:  
primary as per family consensus, Matt Husbands - Other Relative - [phone] Primary Decision Maker: Lucius Sanchez - Sister - [phone] 2 other siblings have decided to defer decision making to Redell Payment and Krelaurel Ratel Supplemental (Other) Decision Maker: aNt Alonso - Brother - [phone] Supplemental (Other) Decision Maker: Genevieve Mac Sister - [phone] Sister Roberto Carlos Robles has brain injury and unable to participate in [employer] [address] for patient. 
  
DVT Prophylaxis: Hep SQ and SCD's 
GI Prophylaxis: not indicated 
  
Baseline:  unknown Subjective: Chief Complaint / Reason for Physician Visit \"I feel fine\". Discussed with RN events overnight. Review of Systems: No fevers, chills, appetite change, cough, sputum production, shortness of breath, dyspnea on exertion, nausea, vomitting, diarrhea, constipation, chest pain, leg edema, abdominal pain, joint pain, rash, itching. Tolerating diet. Objective: VITALS:  
Last 24hrs VS reviewed since prior progress note. Most recent are: 
Patient Vitals for the past 24 hrs: 
 Temp Pulse Resp BP SpO2  
10/25/19 0816 98 °F (36.7 °C) 100 16 114/88 98 % Intake/Output Summary (Last 24 hours) at 10/25/2019 1334 Last data filed at 10/24/2019 8949 Gross per 24 hour Intake 240 ml Output  Net 240 ml PHYSICAL EXAM: 
General: WD, WN. Alert, cooperative, no acute distress EENT:  MMM Resp:  No accessory muscle use CV:  Pulse rate normal 
GI:  Soft, nonteder Neurologic:  Alert and oriented X self, normal speech, Psych:   Good insight. Not anxious nor agitated Skin:  No rashes. No jaundice Reviewed most current lab test results and cultures  YES Reviewed most current radiology test results   YES Review and summation of old records today    NO Reviewed patient's current orders and MAR    YES 
PMH/SH reviewed - no change compared to H&P 
________________________________________________________________________ Care Plan discussed with: 
  Comments Patient  x Family RN  x Care Manager  x Consultant Multidiciplinary team rounds were held today with , nursing, pharmacist and clinical coordinator. Patient's plan of care was discussed; medications were reviewed and discharge planning was addressed. ________________________________________________________________________ Total NON critical care TIME:  15   Minutes Total CRITICAL CARE TIME Spent:   Minutes non procedure based Comments >50% of visit spent in counseling and coordination of care    
________________________________________________________________________ Oksana Palafox MD  
 
Procedures: see electronic medical records for all procedures/Xrays and details which were not copied into this note but were reviewed prior to creation of Plan. LABS: 
I reviewed today's most current labs and imaging studies. Pertinent labs include: No results for input(s): WBC, HGB, HCT, PLT, HGBEXT, HCTEXT, PLTEXT in the last 72 hours. No results for input(s): NA, K, CL, CO2, GLU, BUN, CREA, CA, MG, PHOS, ALB, TBIL, TBILI, SGOT, ALT, INR, INREXT in the last 72 hours.  
 
Signed: Oksana Palafox MD

## 2019-10-26 NOTE — PROGRESS NOTES
Bedside shift change report given to GABY Bull (oncoming nurse) by Bernie Heck (offgoing nurse). Report included the following information SBAR, Kardex, Intake/Output, MAR and Recent Results.

## 2019-10-26 NOTE — PROGRESS NOTES
Hospitalist Progress Note NAME: Kirsten Tony :  1950 MRN:  [id number] Room Number:  447/99  @ [facility] Summary: 78 y.o. male whom presented on 2019 with Assessment / Plan: 
Update :  
Patient has progressive dyspnea worsened compared to yesterday. Educated nursing to administer PRN Morphine and Furosemide for the same. I communicated with nephew Kirsten Tony and addressed his concerns regarding end of life clinical course and management. Debbie Brito thinks 'I don't think he has very long'. Comfort measures only Patient was transitioned to [address] [place] [address] after goals of care meeting with Palliative medicine. Family preference is [facility].  
 - Lorazepam 2 mg SL PRN for anxiety. - Morphine 10 mg PRN for pain/shortness of breath - Lasix 40 mg oral PRN for shortness of breath 
- Oxygen for comfort as needed - Regular diet 
  
  
Dementia, new diagnosis :  
Collateral history and clinical progression highly suggestive of dementia. MRI brain-No acute intracranial process seen [facility] [place] RPR negative, B12 wnl, TSH elevated. He does not have medical decision making capacity. Was started on Donepezil and thiamine, levoythyroxine. After family meeting, transitioned to [address] [place] [address].   
  
  
New onset Systolic Congestive Heart Failure, EF~20% Dyspnea on exertion d/t HFrEF & left pleural effusionECHO -echo shows extremely poor LV function, ejection fraction well under 20% with a spherical mass in the LV apex ,Possible long-standing granulomatous disease. Started on coreg, aldactone and Entresto by cards. Transitioned to [address] [place] [address]. PRN Lasix for comfort.  
  
Hypothyroidism POA 
TSH elevated, Free T4 borderline low. Discontinued as patient CMO. Elevated troponin POA: 
EKG shows sinus arrhythmia, T wave inversion in V5-V6. Troponin 0.07. Likely d/t congestive heart failure.   
Microcytic hypochromic anemia : 
likely iron deficiency.  
  
  
Code Status:  FULL 
 Surrogate Decision Maker:  
primary as per family consensus, Fernando Alejo - Other Relative - [phone] Primary Decision Maker: Gopi Dutta - Sister - [phone] 
 
 
2 other siblings have decided to defer decision making to Champ Nuno and Dea Camacho Supplemental (Other) Decision Maker: Alex Roper - Brother - [phone] Supplemental (Other) Decision Maker: Reymundo Wilcox Sister - [phone] Sister Santy Hayden has brain injury and unable to participate in [employer] [address] for patient. 
  
  
DVT Prophylaxis: Hep SQ and SCD's 
GI Prophylaxis: not indicated 
  
Baseline:  unknown Subjective: Chief Complaint / Reason for Physician Visit \"I think I am going to die\". Discussed with RN events overnight. Review of Systems: 
+ dyspnea on exertion, shortness of breath. No fevers, chills, appetite change, cough, sputum production, nausea, vomitting, diarrhea, constipation, chest pain, leg edema, abdominal pain, joint pain, rash, itching. Tolerating diet. Objective: VITALS:  
Last 24hrs VS reviewed since prior progress note. Most recent are: 
Patient Vitals for the past 24 hrs: 
 Temp Pulse Resp BP SpO2  
10/26/19 0805 98.1 °F (36.7 °C) 99 18 (!) 139/98 100 % 10/25/19 2006 97.3 °F (36.3 °C) 100 20 125/90 99 % No intake or output data in the 24 hours ending 10/26/19 0914 PHYSICAL EXAM: 
General: Alert, cooperative, short of breath EENT:  EOMI. Anicteric sclerae. MMM Resp:  Decreased breath sounds at bilateral bases. no wheezing or rales. Mild accessory muscle use. CV:  Regular  rhythm,  normal S1/S2, no murmurs rubs gallops, No edema GI:  Soft, Non distended, Non tender. +Bowel sounds Neurologic:  Alert and oriented X self/family, normal speech Psych:   Poor insight. Not anxious nor agitated Skin:  No rashes. No jaundice Reviewed most current lab test results and cultures  YES Reviewed most current radiology test results   YES Review and summation of old records today    NO 
 Reviewed patient's current orders and MAR    YES 
PMH/SH reviewed - no change compared to H&P 
________________________________________________________________________ Care Plan discussed with: 
  Comments Patient  x Family   x  
RN  x Care Manager  x Consultant Multidiciplinary team rounds were held today with , nursing, pharmacist and clinical coordinator. Patient's plan of care was discussed; medications were reviewed and discharge planning was addressed. ________________________________________________________________________ Total NON critical care TIME:  15   Minutes Total CRITICAL CARE TIME Spent:   Minutes non procedure based Comments >50% of visit spent in counseling and coordination of care    
________________________________________________________________________ Rudolm Angelucci, MD  
 
Procedures: see electronic medical records for all procedures/Xrays and details which were not copied into this note but were reviewed prior to creation of Plan. LABS: 
I reviewed today's most current labs and imaging studies. Pertinent labs include: No results for input(s): WBC, HGB, HCT, PLT, HGBEXT, HCTEXT, PLTEXT in the last 72 hours. No results for input(s): NA, K, CL, CO2, GLU, BUN, CREA, CA, MG, PHOS, ALB, TBIL, TBILI, SGOT, ALT, INR, INREXT in the last 72 hours.  
 
Signed: Rudolm Angelucci, MD

## 2019-10-26 NOTE — PROGRESS NOTES
Problem: Pressure Injury - Risk of 
Goal: *Prevention of pressure injury Description Document Yosvany Scale and appropriate interventions in the flowsheet. Outcome: Progressing Towards Goal 
Note:  
Pressure Injury Interventions: 
Sensory Interventions: Avoid rigorous massage over bony prominences, Float heels, Keep linens dry and wrinkle-free, Minimize linen layers Moisture Interventions: Absorbent underpads, Minimize layers, Moisture barrier Activity Interventions: Increase time out of bed Mobility Interventions: Chair cushion, HOB 30 degrees or less, Turn and reposition approx. every two hours(pillow and wedges) Nutrition Interventions: Document food/fluid/supplement intake Friction and Shear Interventions: HOB 30 degrees or less Problem: Patient Education: Go to Patient Education Activity Goal: Patient/Family Education Outcome: Progressing Towards Goal 
  
Problem: Falls - Risk of 
Goal: *Absence of Falls Description Document Vickii Bridges Fall Risk and appropriate interventions in the flowsheet. Outcome: Progressing Towards Goal 
Note:  
Fall Risk Interventions: 
Mobility Interventions: Bed/chair exit alarm, Communicate number of staff needed for ambulation/transfer, Patient to call before getting OOB, Utilize walker, cane, or other assistive device Mentation Interventions: Bed/chair exit alarm, Door open when patient unattended, More frequent rounding, Reorient patient, Toileting rounds Medication Interventions: Bed/chair exit alarm, Patient to call before getting OOB, Teach patient to arise slowly Elimination Interventions: Call light in reach, Stay With Me (per policy), Toilet paper/wipes in reach Problem: Patient Education: Go to Patient Education Activity Goal: Patient/Family Education Outcome: Progressing Towards Goal 
  
Problem: Patient Education: Go to Patient Education Activity Goal: Patient/Family Education Outcome: Progressing Towards Goal 
 Problem: Patient Education: Go to Patient Education Activity Goal: Patient/Family Education Outcome: Progressing Towards Goal 
  
Problem: Cardiac Output -  Decreased Goal: *Vital signs within specified parameters Outcome: Progressing Towards Goal 
Goal: *Optimal cardiac output Outcome: Progressing Towards Goal 
Goal: *Absence of hypoxia Outcome: Progressing Towards Goal 
Goal: *Absence of peripheral edema Outcome: Progressing Towards Goal 
Goal: *Intravascular fluid volume and electrolyte balance Outcome: Progressing Towards Goal

## 2019-10-27 NOTE — PROGRESS NOTES
0710) Bedside shift change report given to Lora Melgar RN (oncoming nurse) by Shakir Chacon RN (offgoing nurse). Report included the following information SBAR, Kardex and MAR.  
4562) pt drowsy; not able to eat breakfast.  Extremities cold with weak pulses. 1155) pt open eyes briefly with turn 1249) pt sleeping; not able to have lunch. 1920) Bedside shift change report given to Andrey Torres RN (oncoming nurse) by Lora Melgar RN (offgoing nurse). Report included the following information SBAR, Kardex and MAR.

## 2019-10-27 NOTE — PROGRESS NOTES
Follow-Up Nutrition Assessment: 
  
INTERVENTIONS/RECOMMENDATIONS:  
·  comfort foods as medically appropriate ·   ·   
  
ASSESSMENT:  
Pt admitted with AMS, heart failure. Diet is regular with ensure available on each meal tray.  Pt on pain, diuresis and anxiety meds. Believes he will die soon. Per cardiology, encephalopathy with poor prognosis.  CHF management now for comfort.  Family has chosen [facility]. 
  
Diet Order: NPO(was cardiac low Na+) % Eaten:   
Patient Vitals for the past 72 hrs: 
  % Diet Eaten 10/26/19 0946 20 % 10/25/19 1735 30 % 10/24/19 1343 50 % 10/23/19 1228 50 % 10/23/19 0810 40 %  
  
Pertinent Medications: [x]Reviewed []Other Pertinent Labs: [x]Reviewed []Other Food Allergies: [x]None []Other Last BM:           [x]Active     []Hyperactive  []Hypoactive       [] Absent BS Skin:     [] Intact              [] Incision           [x] Breakdown                [] Other: 
  
Anthropometrics:  
Height: 5' 5\" (165.1 cm)         Weight: 64.2 kg (163 lb 12.8 oz)  
IBW (%IBW): 64.4 kg (142 lb) (99.65 %)       UBW (%UBW):   (  %) Last Weight Metrics: 
Weight Loss Metrics 10/13/2019 9/30/2019 Today's Wt 163 lb 12. 8 oz -  
BMI - 27.26 kg/m2  
  
  
BMI: Body mass index is 27.26 kg/m².             This BMI is indicative of: 
 []Underweight    []Normal    [x]Overweight    [] Obesity   [] Extreme Obesity (BMI>40)  
  
Estimated Nutrition Needs (Based on):  
9716 Kcals/day(1545 x 1.2) , 67 g(~ 1 g/kg) Protein Carbohydrate: At Least 130 g/day  Fluids: 1280 mL/day (MD order) 
  
NUTRITION DIAGNOSES:  
Problem:  No nutritional diagnosis at this time     
Etiology: related to      
Signs/Symptoms: as evidenced by     
  
NUTRITION INTERVENTIONS: 
              comfort only 
  
GOAL:  
some po intake for comfort 
  
LEARNING NEEDS (Diet, Food/Nutrient-Drug Interaction):  
 [x] None Identified 
 [] Identified and Education Provided/Documented  [] Identified and Pt declined/was not appropriate 
  
Cultureal, Methodist, OR Ethnic Dietary Needs:  
 [x] None Identified 
 [] Identified and Addressed 
  
 [x] Interdisciplinary Care Plan Reviewed/Documented  
 [x] Discharge Planning:  Comfort care  
  
MONITORING /EVALUATION:  
Behavioral-Environmental Outcomes: Behavior Food/Nutrient Intake Outcomes: Total energy intake Physical Signs/Symptoms Outcomes: Other (comment) 
  
NUTRITION RISK:  
 [x] Patient At Nutritional Risk      
 [] Patient Not At Nutritional Risk 
  
PT SEEN FOR:  
 []  MD Consult: []Calorie Count                                     []Diabetic Diet Education                                   
                                    []Diet Education 
                                    []Electrolyte Management 
                                    [x]General Nutrition Management and Supplements                                     []Management of Tube Feeding 
                                    []TPN Recommendations  
 []  RN Referral:             []MST score >=2 
                                    []Enteral/Parenteral Nutrition PTA 
                                    []Pregnant: Gestational DM or Multigestation 
                                    []Pressure Ulcer/Wound Care needs 
  
[]  Low BMI []  MAYANK Castillo, PhD, RD, [facility]JCSQ [phone]

## 2019-10-27 NOTE — PROGRESS NOTES
1055 - Pt is still drowsy - did not eat breakfast. Pt on 2L cannula while resting. Pt not in respirtatory distress. Asked pt if he just wanted to sleep, he responded \"Yes\".

## 2019-10-27 NOTE — PROGRESS NOTES
Hospitalist Progress Note NAME: Bonifacio Rhodes :  1950 MRN:  [id number] Room Number:  507/34  @ [facility] Summary: 78 y.o. male whom presented on 2019 with Assessment / Plan: 
Update :  
Dyspnea has been progressively worsening. Received 2 PRNs of lasix, morphine yesterday, is on 2 L nasal cannula. Looks comfortable.  
  
  
Comfort measures only Patient was transitioned to [address] [place] [address] after goals of care meeting with Palliative medicine. Family preference is [facility].  
 - Lorazepam 2 mg SL PRN for anxiety. - Morphine 10 mg PRN for pain/shortness of breath - Lasix 40 mg oral PRN for shortness of breath 
- Oxygen for comfort as needed - Regular diet  
  
  
Dementia, new diagnosis :  
Collateral history and clinical progression highly suggestive of dementia. MRI brain-No acute intracranial process seen Yuki Feng RPR negative, B12 wnl, TSH elevated. He does not have medical decision making capacity. Was started on Donepezil and thiamine, levoythyroxine. After family meeting, transitioned to [address] [place] [address].   
  
  
New onset Systolic Congestive Heart Failure, EF~20% Dyspnea on exertion d/t HFrEF & left pleural effusionECHO -echo shows extremely poor LV function, ejection fraction well under 20% with a spherical mass in the LV apex ,Possible long-standing granulomatous disease. Started on coreg, aldactone and Entresto by cards. Transitioned to [address] [place] [address]. PRN Lasix for comfort.  
  
Hypothyroidism POA 
TSH elevated, Free T4 borderline low. Discontinued as patient CMO. Elevated troponin POA: 
EKG shows sinus arrhythmia, T wave inversion in V5-V6. Troponin 0.07. Likely d/t congestive heart failure. Microcytic hypochromic anemia : 
likely iron deficiency.  
  
  
Code Status:  FULL Surrogate Decision Maker:  
primary as per family consensus, Adam Pepin - Other Relative - [phone] Primary Decision Maker: Lawerence Sicard - Sister - [phone] 2 other siblings have decided to defer decision making to Petrona Arias and Ashlyn Pelletier Supplemental (Other) Decision Maker: Liane Alvarados - Brother - [phone] Supplemental (Other) Decision Maker: Mansi Gonzalez Sister - [phone] Sister Otilia Chance has brain injury and unable to participate in [address] for patient. 
  
  
DVT Prophylaxis: Hep SQ and SCD's 
GI Prophylaxis: not indicated 
  
Baseline:  unknown Subjective: Chief Complaint / Reason for Physician Visit \" I just want to sleep\". Discussed with RN events overnight. Review of Systems: 
+ shortness of breath at rest. No fevers, chills, appetite change, cough, sputum production, nausea, vomitting, diarrhea, constipation, chest pain, leg edema, abdominal pain, joint pain, rash, itching. Tolerating diet. Objective: VITALS:  
Last 24hrs VS reviewed since prior progress note. Most recent are: 
Patient Vitals for the past 24 hrs: 
 Temp Pulse Resp BP SpO2  
10/27/19 0851     100 % 10/27/19 0846 97.6 °F (36.4 °C) 87 20 115/88 100 % 10/27/19 0649 97.4 °F (36.3 °C) 78 16 (!) 121/99 98 % 10/26/19 1942 98.4 °F (36.9 °C) 89 14 141/88 99 % 10/26/19 1641     100 % 10/26/19 1155 98.1 °F (36.7 °C) 93 16 112/79 100 % No intake or output data in the 24 hours ending 10/27/19 1127 PHYSICAL EXAM: 
General: WD, WN. Alert, cooperative, no acute distress EENT:  EOMI. Anicteric sclerae. MMM Resp:  CTA bilaterally, no wheezing or rales. No accessory muscle use CV:  Regular  rhythm,  normal S1/S2, no murmurs rubs gallops, No edema GI:  Soft, Non distended, Non tender. +Bowel sounds Neurologic:  Alert and oriented X 3, normal speech, Psych:   Good insight. Not anxious nor agitated Skin:  No rashes. No jaundice Reviewed most current lab test results and cultures  YES Reviewed most current radiology test results   YES Review and summation of old records today    NO Reviewed patient's current orders and MAR    YES 
 PMH/SH reviewed - no change compared to H&P 
________________________________________________________________________ Care Plan discussed with: 
  Comments Patient  x Family   x  
RN  x Care Manager  x Consultant Multidiciplinary team rounds were held today with , nursing, pharmacist and clinical coordinator. Patient's plan of care was discussed; medications were reviewed and discharge planning was addressed. ________________________________________________________________________ Total NON critical care TIME:  15  Minutes Total CRITICAL CARE TIME Spent:   Minutes non procedure based Comments >50% of visit spent in counseling and coordination of care    
________________________________________________________________________ Tod Hernandez MD  
 
Procedures: see electronic medical records for all procedures/Xrays and details which were not copied into this note but were reviewed prior to creation of Plan. LABS: 
I reviewed today's most current labs and imaging studies. Pertinent labs include: No results for input(s): WBC, HGB, HCT, PLT, HGBEXT, HCTEXT, PLTEXT in the last 72 hours. No results for input(s): NA, K, CL, CO2, GLU, BUN, CREA, CA, MG, PHOS, ALB, TBIL, TBILI, SGOT, ALT, INR, INREXT in the last 72 hours.  
 
Signed: Tod Hernandez MD

## 2019-10-27 NOTE — PROGRESS NOTES
Pt is yelling out, anxious and uncooperative. Ativan given per orders on the [facility].

## 2019-10-28 NOTE — PROGRESS NOTES
1916: Bedside shift change report given to [facility] (oncoming nurse) by [facility] (offgoing nurse). Report included the following information SBAR, Kardex, ED Summary, Intake/Output, MAR and Recent Results. 0715: Bedside shift change report given to [facility] (oncoming nurse) by [facility] (offgoing nurse). Report included the following information SBAR, Kardex, ED Summary, Intake/Output, MAR and Recent Results.

## 2019-10-28 NOTE — PROGRESS NOTES
Hospitalist Progress Note NAME: Welby Schilder :  1950 MRN:  [id number] Room Number:  850/27  @ [facility] Summary: 78 y.o. male whom presented on 2019 with Assessment / Plan: 
Update :  
He is comfortable on current regimen.  
  
 
Comfort measures only Patient was transitioned to [facility] goals of care meeting with Palliative medicine. Family preference is [facility].  
 - Lorazepam 2 mg SL PRN for anxiety. - Morphine 10 mg PRN for pain/shortness of breath - Lasix 40 mg oral PRN for shortness of breath 
- Oxygen for comfort as needed - Regular diet  
  
  
Dementia, new diagnosis :  
Collateral history and clinical progression highly suggestive of dementia. MRI brain-No acute intracranial process seen Gerldine Prost RPR negative, B12 wnl, TSH elevated. He does not have medical decision making capacity. Was started on Donepezil and thiamine, levoythyroxine. After family meeting, transitioned to [address] [place] [address].   
  
  
New onset Systolic Congestive Heart Failure, EF~20% Dyspnea on exertion d/t HFrEF & left pleural effusion ECHO -echo shows extremely poor LV function, ejection fraction well under 20% with a spherical mass in the LV apex ,Possible long-standing granulomatous disease. Started on coreg, aldactone and Entresto by cards. Transitioned to [address] [place] [address]. PRN Lasix for comfort.  
  
Hypothyroidism POA 
TSH elevated, Free T4 borderline low. Discontinued as patient CMO. Elevated troponin POA: 
EKG shows sinus arrhythmia, T wave inversion in V5-V6. Troponin 0.07. Likely d/t congestive heart failure. Microcytic hypochromic anemia : 
likely iron deficiency.  
   
Code Status:  FULL Surrogate Decision Maker:  
Mary Jane Carr [phone], nephew Supplemental (Other) Decision Maker: Per Farley - Sister - [phone] 
 
2 other siblings have decided to defer decision making to Jose Vega and Addison Castillo Supplemental (Other) Decision Maker: Guero Mayo - Brother - [phone] Supplemental (Other) Decision Maker: Arun Sarmiento - Sister - [phone] 
  
DVT Prophylaxis: Hep SQ and SCD's 
GI Prophylaxis: not indicated 
  
Baseline:  unknown Subjective: Chief Complaint / Reason for Physician Visit \"I feel fine\". Discussed with RN events overnight. Review of Systems: 
+ dyspnea at rest. No fevers, chills, appetite change, cough, sputum production, nausea, vomitting, diarrhea, constipation, chest pain, leg edema, abdominal pain, joint pain, rash, itching. Tolerating diet. Objective: VITALS:  
Last 24hrs VS reviewed since prior progress note. Most recent are: 
Patient Vitals for the past 24 hrs: 
 Temp Pulse Resp BP SpO2  
10/28/19 0826 97.4 °F (36.3 °C) 100 20 145/87 100 % 10/27/19 2037 98.8 °F (37.1 °C) 98 16 130/80 100 % 10/27/19 1606 98.4 °F (36.9 °C) 95 16 122/74 100 % Intake/Output Summary (Last 24 hours) at 10/28/2019 1416 Last data filed at 10/28/2019 1258 Gross per 24 hour Intake 600 ml Output  Net 600 ml PHYSICAL EXAM: 
General: WD, WN. Alert, cooperative, no acute distress EENT:  EOMI. Anicteric sclerae. MMM Resp:  CTA bilaterally, no wheezing or rales. No accessory muscle use CV:  Regular  rhythm,  normal S1/S2, no murmurs rubs gallops, No edema GI:  Soft, Non distended, Non tender. +Bowel sounds Neurologic:  Alert and oriented X self, normal speech, Psych:   Poor insight. Not anxious nor agitated Skin:  No rashes. No jaundice Reviewed most current lab test results and cultures  YES Reviewed most current radiology test results   YES Review and summation of old records today    NO Reviewed patient's current orders and MAR    YES 
PMH/SH reviewed - no change compared to H&P 
________________________________________________________________________ Care Plan discussed with: 
  Comments Patient  x Family   x  
RN  x  
 Care Manager  x Consultant Multidiciplinary team rounds were held today with , nursing, pharmacist and clinical coordinator. Patient's plan of care was discussed; medications were reviewed and discharge planning was addressed. ________________________________________________________________________ Total NON critical care TIME:  15   Minutes Total CRITICAL CARE TIME Spent:   Minutes non procedure based Comments >50% of visit spent in counseling and coordination of care    
________________________________________________________________________ Concetta Puri MD  
 
Procedures: see electronic medical records for all procedures/Xrays and details which were not copied into this note but were reviewed prior to creation of Plan. LABS: 
I reviewed today's most current labs and imaging studies. Pertinent labs include: No results for input(s): WBC, HGB, HCT, PLT, HGBEXT, HCTEXT, PLTEXT in the last 72 hours. No results for input(s): NA, K, CL, CO2, GLU, BUN, CREA, CA, MG, PHOS, ALB, TBIL, TBILI, SGOT, ALT, INR, INREXT in the last 72 hours.  
 
Signed: Concetta Puri MD

## 2019-10-28 NOTE — PROGRESS NOTES
0700) Bedside shift change report given to Kingsley Espino RN (oncoming nurse) by Atul Doran RN (offgoing nurse). Report included the following information SBAR, Kardex, MAR, Accordion, Recent Results and Cardiac Rhythm A fib with PVC's.  
0825) pt more alert today. Pivot to chair. Pt difficulty with sitting mechanics. Pt refuse breakfast 
1940) Bedside shift change report given to Frankey Hum, RN (oncoming nurse) by Kingsley Espino RN (offgoing nurse). Report included the following information SBAR, Kardex and MAR.

## 2019-10-28 NOTE — PROGRESS NOTES
Bedside and Verbal shift change report given to Rakel Hamilton RN (oncoming nurse) by Ceci Melo and Catarino Edward LPN (offgoing nurse). Report included the following information SBAR, Kardex, Procedure Summary, Intake/Output, MAR, Accordion, Recent Results and Med Rec Status.

## 2019-10-28 NOTE — INTERDISCIPLINARY ROUNDS
IDR with Dr. Tyrone Malhotra (MD), Hilda (pharmacy student), Phan Scott (), (nurse supervisor),  Cindy Carpio (volunteer), Chacho Flores (spiritual volunteer), and Levonne Kussmaul (RN) to discuss plan of care including pt on oxygen for comfort, no pain medicine needed overnight.

## 2019-10-28 NOTE — INTERDISCIPLINARY ROUNDS
Discuss treatment plan, patient eat breakfast this morning. Waiting on Medicaid to be approve. [facility] 
[phone]

## 2019-10-29 NOTE — PROGRESS NOTES
Pt appears to be uncomfortable with SN moving pt, suppository given at this time and then pain meds. Pt did drink about half of the miralax but would not drink all of it with apple juice

## 2019-10-29 NOTE — PROGRESS NOTES
Physical therapy:  
 
Patient declining physical therapy secondary to increased fatigue. Will attempt as able.   
 
Major Click, PT 
 [place]

## 2019-10-29 NOTE — PROGRESS NOTES
Hospitalist Progress Note NAME: Alena Ballard :  1950 MRN:  [id number] Room Number:  961/79  @ [facility] Summary: 78 y.o. male whom presented on 2019 with Assessment / Plan: 
 
Comfort measures only Patient was transitioned to [facility] goals of care meeting with Palliative medicine. Family preference is [facility]. He has been declining for the last few days. PRN morphine is no longer sufficient to provide baseline comfort for shortness of breath/pain. He is using abdominal muscles predominantly for respiratory. Suspect he has abdominal discomfort as well due to constipation (last bowel movement charted 10/23). - Start daily Miralax for constipation. PRN bisacodyl suppository. Will try suppository today and if not effective, will give Fleet enema. - Morphine oral 10 mg 4 times daily scheduled. Morphine 10 mg PRN every 15 minutes for breakthrough pain/shortness of breath - Lasix 40 mg oral PRN for shortness of breath 
-Lorazepam 2 mg SL PRN for anxiety 
- Oxygen for comfort as needed - Regular diet  
  
  
Dementia, new diagnosis :  
Collateral history and clinical progression highly suggestive of dementia. MRI brain-No acute intracranial process seen Marley Frizzle RPR negative, B12 wnl, TSH elevated. He does not have medical decision making capacity. Was started on Donepezil and thiamine, levoythyroxine. After family meeting, transitioned to [address] [place] [address].   
  
  
New onset Systolic Congestive Heart Failure, EF~20% Dyspnea on exertion d/t HFrEF & left pleural effusion ECHO -echo shows extremely poor LV function, ejection fraction well under 20% with a spherical mass in the LV apex ,Possible long-standing granulomatous disease. Started on coreg, aldactone and Entresto by cards. Transitioned to [address] [place] [address]. PRN Lasix for comfort.  
  
Hypothyroidism POA 
TSH elevated, Free T4 borderline low. Discontinued as patient CMO.  
Elevated troponin POA: 
 EKG shows sinus arrhythmia, T wave inversion in V5-V6. Troponin 0.07. Likely d/t congestive heart failure. Microcytic hypochromic anemia : 
likely iron deficiency.  
   
Code Status:  FULL Surrogate Decision Maker:  
Sarahi Funge [phone], nephew Supplemental (Other) Decision Maker: Beena Hamilton - Sister - [phone] 
 
2 other siblings have decided to defer decision making to Haskel Graves and Kaplan Spells Supplemental (Other) Decision Maker: Dolores Villegas - Brother - [phone] Supplemental (Other) Decision Maker: Hortencia Aviles - Sister - [phone] 
  
DVT Prophylaxis: Hep SQ and SCD's 
GI Prophylaxis: not indicated 
  
Baseline:  unknown Subjective: Chief Complaint / Reason for Physician Visit \"I feel fine\". Falls asleep during interview. Grimaces during abdominal exam but denies any pain when asked. Visibly short of breath. Discussed with RN events overnight. Review of Systems: No fevers, chills, appetite change, cough, sputum production,  nausea, vomitting, diarrhea, constipation, chest pain, leg edema,  joint pain, rash, itching. Tolerating PT/OT. Tolerating diet. Objective: VITALS:  
Last 24hrs VS reviewed since prior progress note. Most recent are: 
Patient Vitals for the past 24 hrs: 
 Temp Pulse Resp BP SpO2  
10/28/19 2000 97.6 °F (36.4 °C) (!) 120 20 126/90 100 % Intake/Output Summary (Last 24 hours) at 10/29/2019 2364 Last data filed at 10/28/2019 1851 Gross per 24 hour Intake 1080 ml Output  Net 1080 ml PHYSICAL EXAM: 
General: WD, WN. Sleepy but arousable, cooperative, using abdominal muscles for respiration EENT:  EOMI. Anicteric sclerae. MMM Resp:  CTA bilaterally, no wheezing or rales. + accessory muscle use CV:  Regular  rhythm,  normal S1/S2, no murmurs rubs gallops, No edema GI:  Soft, Non distended, diffusely tender to palpation. +Bowel sounds Neurologic:  Sleepy but arousable, oriented X self, normal speech,  
 Psych:   Poor insight. Not anxious nor agitated Skin:  No rashes. No jaundice Reviewed most current lab test results and cultures  YES Reviewed most current radiology test results   YES Review and summation of old records today    NO Reviewed patient's current orders and MAR    YES 
PMH/SH reviewed - no change compared to H&P 
________________________________________________________________________ Care Plan discussed with: 
  Comments Patient  x Family   X left voice mail for Donny vazquez RN  x Care Manager  x Consultant Multidiciplinary team rounds were held today with , nursing, pharmacist and clinical coordinator. Patient's plan of care was discussed; medications were reviewed and discharge planning was addressed. ________________________________________________________________________ Total NON critical care TIME:  20   Minutes Total CRITICAL CARE TIME Spent:   Minutes non procedure based Comments >50% of visit spent in counseling and coordination of care    
________________________________________________________________________ Odalys Menon MD  
 
Procedures: see electronic medical records for all procedures/Xrays and details which were not copied into this note but were reviewed prior to creation of Plan. LABS: 
I reviewed today's most current labs and imaging studies. Pertinent labs include: No results for input(s): WBC, HGB, HCT, PLT, HGBEXT, HCTEXT, PLTEXT in the last 72 hours. No results for input(s): NA, K, CL, CO2, GLU, BUN, CREA, CA, MG, PHOS, ALB, TBIL, TBILI, SGOT, ALT, INR, INREXT in the last 72 hours.  
 
Signed: Odalys Menon MD

## 2019-10-29 NOTE — PROGRESS NOTES
Talked to Dr. Kristine Camacho about pt making facial grimaces when medical staff was repositioning pt. Dr. Kristine Camacho informed SN that she would make his pain medication routine

## 2019-10-29 NOTE — PROGRESS NOTES
Problem: Pressure Injury - Risk of 
Goal: *Prevention of pressure injury Description Document Yosvany Scale and appropriate interventions in the flowsheet. Outcome: Progressing Towards Goal 
Note:  
Pressure Injury Interventions: 
Sensory Interventions: Minimize linen layers Moisture Interventions: Absorbent underpads Activity Interventions: Increase time out of bed, Pressure redistribution bed/mattress(bed type) Mobility Interventions: Turn and reposition approx. every two hours(pillow and wedges) Nutrition Interventions: Offer support with meals,snacks and hydration Friction and Shear Interventions: Apply protective barrier, creams and emollients Problem: Falls - Risk of 
Goal: *Absence of Falls Description Document Tari Dear Fall Risk and appropriate interventions in the flowsheet. Outcome: Progressing Towards Goal 
Note:  
Fall Risk Interventions: 
Mobility Interventions: Bed/chair exit alarm, Patient to call before getting OOB Mentation Interventions: Adequate sleep, hydration, pain control, Bed/chair exit alarm Medication Interventions: Bed/chair exit alarm Elimination Interventions: Bed/chair exit alarm Problem: Patient Education: Go to Patient Education Activity Goal: Patient/Family Education Outcome: Progressing Towards Goal

## 2019-10-30 NOTE — PROGRESS NOTES
Bedside shift change report given to GABY OROSCO (oncoming nurse)/Amena by Acosta Gifford RN (offgoing nurse).  Report included the following information SBAR, MAR and Recent Results.

## 2019-10-30 NOTE — PROGRESS NOTES
Bedside and Verbal shift change report given to [address] and Elder Blakely LPN (oncoming nurse) by [address] (offgoing nurse). Report included the following information SBAR, Kardex, Procedure Summary, Intake/Output, MAR, Accordion, Recent Results and Med Rec Status.

## 2019-10-30 NOTE — PROGRESS NOTES
Problem: Pressure Injury - Risk of 
Goal: *Prevention of pressure injury Description Document Yosvany Scale and appropriate interventions in the flowsheet. Outcome: Progressing Towards Goal 
Note:  
Pressure Injury Interventions: 
Sensory Interventions: Avoid rigorous massage over bony prominences, Check visual cues for pain, Keep linens dry and wrinkle-free, Minimize linen layers, Turn and reposition approx. every two hours (pillows and wedges if needed) Moisture Interventions: Absorbent underpads, Check for incontinence Q2 hours and as needed Activity Interventions: Pressure redistribution bed/mattress(bed type) Mobility Interventions: Turn and reposition approx. every two hours(pillow and wedges) Nutrition Interventions: Offer support with meals,snacks and hydration Friction and Shear Interventions: HOB 30 degrees or less, Minimize layers Problem: Patient Education: Go to Patient Education Activity Goal: Patient/Family Education Outcome: Progressing Towards Goal 
  
Problem: Falls - Risk of 
Goal: *Absence of Falls Description Document Morteza Hughes Fall Risk and appropriate interventions in the flowsheet. Outcome: Progressing Towards Goal 
Note:  
Fall Risk Interventions: 
Mobility Interventions: Communicate number of staff needed for ambulation/transfer Mentation Interventions: Adequate sleep, hydration, pain control, Door open when patient unattended, More frequent rounding Medication Interventions: Evaluate medications/consider consulting pharmacy Elimination Interventions: Toileting schedule/hourly rounds Problem: Patient Education: Go to Patient Education Activity Goal: Patient/Family Education Outcome: Progressing Towards Goal 
  
Problem: Fatigue-Palliative Care Goal: *PALLIATIVE CARE-Alleviation of fatigue Outcome: Progressing Towards Goal

## 2019-10-30 NOTE — PROGRESS NOTES
Hospitalist Progress Note NAME: Wilmer Wiggins :  1950 MRN:  [id number] Assessment / Plan: 
Patient is comfort measures, see ACP for latest family meeting communication Dementia, newly diagnosed HFrEF (LVEF 20%) Pleural effusion Elevated troponin - Patient appears comfortable. - Continue comfort care. - Continue scheduled morphine 10 mg po qid. - Continue morphine 10 mg po q15 min prn.  
- Continue haloperidol 2 mg SL q6h prn. 
- Continue lorazepam 2 mg SL q1h prn.   
- Continue scopolamine transdermal q72h prn. Constipation - More aggressive bowel regimen started 10/29/19. 
- Continue Miralax 17 gm po daily.   
- Fleet enema ordered, but patient had large BM prior to receiving. 25.0 - 29.9 Overweight / Body mass index is 26.78 kg/m². Code status: DNR Prophylaxis: Not indicated Recommended Disposition: Comfort care Subjective: Chief Complaint / Reason for Physician Visit Patient opens eyes to verbal and tactile stimuli, but offers no verbal response. Review of Systems: 
Symptom Y/N Comments  Symptom Y/N Comments Fever/Chills    Chest Pain Poor Appetite    Edema Cough    Abdominal Pain Sputum    Joint Pain SOB/LANDA    Pruritis/Rash Nausea/vomit    Tolerating PT/OT Diarrhea    Tolerating Diet Constipation    Other Could NOT obtain due to:   
 
Objective: VITALS:  
Last 24hrs VS reviewed since prior progress note. Most recent are: 
Patient Vitals for the past 24 hrs: 
 Temp Pulse Resp BP SpO2  
10/30/19 0845 97.5 °F (36.4 °C) (!) 120 18 121/84 100 % 10/29/19 1946 98.8 °F (37.1 °C) (!) 104 18 139/89 100 % Intake/Output Summary (Last 24 hours) at 10/30/2019 1314 Last data filed at 10/30/2019 1245 Gross per 24 hour Intake 480 ml Output  Net 480 ml PHYSICAL EXAM: 
General:  Somnolent but arousable to verbal and tactile stimuli. Does not offer verbal response. HEENT:  Normocephalic. Mucous membranes moist.   
Chest:  Resps even/unlabored. Lungs CTA. No use of accessory muscles. CV:  RRR. No peripheral edema. GI:  Abdomen soft. Hypoactive bowel sounds. Neurologic:  Minimally responsive. Psych:  No anxiety or agitation. Skin:  No rashes or jaundice. Reviewed most current lab test results and cultures  YES Reviewed most current radiology test results   YES Review and summation of old records today    NO Reviewed patient's current orders and MAR    YES 
PMH/SH reviewed - no change compared to H&P 
________________________________________________________________________ Care Plan discussed with: 
  Comments Patient Y Family  Y   
RN Y Care Manager Y Consultant Multidiciplinary team rounds were held today with , nursing, pharmacist and clinical coordinator. Patient's plan of care was discussed; medications were reviewed and discharge planning was addressed. ___________________________________________________________________________ Lulu Peck NP Procedures: see electronic medical records for all procedures/Xrays and details which were not copied into this note but were reviewed prior to creation of Plan. LABS: 
I reviewed today's most current labs and imaging studies. Pertinent labs include: No results for input(s): WBC, HGB, HCT, PLT, HGBEXT, HCTEXT, PLTEXT in the last 72 hours. No results for input(s): NA, K, CL, CO2, GLU, BUN, CREA, CA, MG, PHOS, ALB, TBIL, TBILI, SGOT, ALT, INR, INREXT in the last 72 hours.  
 
Signed: Lulu Peck NP

## 2019-10-30 NOTE — ADT AUTH CERT NOTES
Patient Demographics Patient Name Jennifer Riojas 
[id number] Sex Male  
1950 Address [place] Phone [phone] (Home) CSN:  
[id number] Admit Date: Admit Time Room Bed Sep 30, 2019  3:43  [[id number]] 01 [[id number]] Attending Providers Provider Pager From To  
Nancy Clemens MD  19 Soni Yaenz MD  09/30/19 10/02/19 Brandyn Hay MD  10/02/19 10/09/19 Soni Yanez MD  10/09/19 10/16/19 Brandyn Hay MD  10/16/19 10/23/19 Soni Yanez MD  10/23/19 Emergency Contact(s) Name Relation Home Work Mobile Kasey Mitchell Sister [phone] Daniel Willis Other Relative [phone] Per Farley Sister [phone] Jose A Langston Brother [phone] Utilization Reviews  
 
   
Heart Failure - Care Day 28 (10/27/2019) by Lissa Dey  
 
   
Review Entered Review Status 10/28/2019 14:48 Completed  
   
Criteria Review Care Day: 28 Care Date: 10/27/2019 Level of Care:   
Guideline Day 2 Clinical Status   
(X) * Hemodynamic stability 10/28/2019 14:47:21 EDT by Lissa Dey   
  t 97. 6. p 87. r 20. sat 100%. 115/88 
vs daily   
(X) * Mental status at baseline   
(X) * MI excluded   
(X) * Cardiac rate and rhythm acceptable ( ) * Oxygenation at baseline or improved 10/28/2019 14:47:21 EDT by Lissa Dey   
  Dyspnea has been progressively worsening. Received 2 PRNs of lasix, morphine yesterday, is on 2 L nasal cannula 
+ shortness of breath at rest.   
( ) * Pulmonary edema absent or improved 10/28/2019 14:47:21 EDT by Lissa Dey   
  Dyspnea has been progressively worsening. Received 2 PRNs of lasix, morphine yesterday, is on 2 L nasal cannula 
+ shortness of breath at rest.   
Activity (X) Advance activity as tolerated 10/28/2019 14:47:21 EDT by Lissa [place]   
  ambulate w/ asst q8h. Routes   
(X) Oral or parenteral medications 10/28/2019 14:48:48 EDT by Nita Bradshaw   
  haldol 2mg sl q6h prn x1. lorazepam conc 2mg iv q1h prn x1. morphine conc 10mg sl q1h prn x2. ( ) Low-salt diet 10/28/2019 14:47:21 EDT by Eduardo Galvan reg diet Interventions   
(X) * Pulmonary catheter absent   
(X) Oxygen * Milestone Additional Notes 10/27/2019   
attending pn:   
Assessment / Plan:   
Update :   
Dyspnea has been progressively worsening. Received 2 PRNs of lasix, morphine yesterday, is on 2 L nasal cannula. Looks comfortable.   
    
    
Comfort measures only Patient was transitioned to [facility] goals of care meeting with Palliative medicine. Family preference is [facility].   
 - Lorazepam 2 mg SL PRN for anxiety. - Morphine 10 mg PRN for pain/shortness of breath - Lasix 40 mg oral PRN for shortness of breath   
- Oxygen for comfort as needed - Regular diet   
    
    
Dementia, new diagnosis :    
Collateral history and clinical progression highly suggestive of dementia. MRI brain-No acute intracranial process seen Yuki Jung RPR negative, B12 wnl, TSH elevated. He does not have medical decision making capacity. Was started on Donepezil and thiamine, levoythyroxine. After family meeting, transitioned to [address]     
    
    
New onset Systolic Congestive Heart Failure, EF~20% Dyspnea on exertion d/t HFrEF & left pleural effusionECHO -echo shows extremely poor LV function, ejection fraction well under 20% with a spherical mass in the LV apex ,Possible long-standing granulomatous disease. Started on coreg, aldactone and Entresto by cards. Transitioned to [address] PRN Lasix for comfort.    
    
Hypothyroidism POA   
TSH elevated, Free T4 borderline low. Discontinued as patient CMO. Elevated troponin POA:   
EKG shows sinus arrhythmia, T wave inversion in V5-V6. Troponin 0.07. Likely d/t congestive heart failure.    
Microcytic hypochromic anemia :   
likely iron deficiency.    
    
 orders: comfort measures only. dnr. turn and position.  
   
Heart Failure - Care Day 1 (9/30/2019) by Mary Yarbrough  
 
   
Review Entered Review Status 10/28/2019 14:41 Completed  
   
Criteria Review Care Day: 1 Care Date: 9/30/2019 Level of Care:   
Guideline Day 1 Clinical Status   
(X) * Clinical Indications met [F]   
(X) Tachypnea, edema, and dyspnea Routes (X) Low-salt diet Interventions   
(X) ECG, CXR, ABG   
(X) Cardiac biomarkers, BNP   
(X) Weigh Medications (X) IV diuretics * Milestone Additional Notes 9/30/2019   
h/p:   
Assessment / Plan:   
    
    
 Active Problems:   
  Altered mental state (9/30/2019)   
    
    
New onset Congestive Heart Failure, EF unknown Shortness of breath, orthopnea, PND, 3+ pedal edema, proBNP 20,057, CXR shows cardiomegaly, left sided pleural effusion, vascular congestion.   
    
- Cardiology consult - Will give Furosemide 20 mg IV   
- Strict Is and Os, 2 g Sodium cardiac diet, daily weights.   
- ECHO   
- Lipid panel, A1c   
- Aspirin 81 mg daily.   
    
    
    
Dyspnea Likely d/t congestive heart failure (see above). EKG not concerning for ischemia. Troponin leak noted. Low suspicion for acute infection.   
    
- Will get CTA Chest to r/o PE.   
- Management of CHF as above.   
    
    
    
Encephalopathy :   
Collateral history and clinical progression highly suggestive of dementia. Rule out delirium. CT shows cerebral atrophy, left occipital lobe encephalomalacia.  Stroke ruled out. LFTs show mild transaminitis. Cr 1.24. ETOH < 10. Rule out infection.   
    
- Will get TSH, B12, HIV, RPR.   
- Will consider starting Donepezil.   
- UDS pending.   
- Urinalysis w/ reflex to culture. - Outpatient Geriatric follow up. - Procalcitonin   
    
    
    
Elevated lactic acid :   
Lactic acid 2.4. Likely due to hypoperfusion d/t congestive heart failure.   
    
- Diurese.  Recheck lactate tomorrow.   
    
    
 Elevated troponin :   
EKG shows sinus arrhythmia, T wave inversion in V5-V6. Troponin 0.07. Likely d/t congestive heart failure. transaminitis: alt 96. ast 56. total bili 1.1.   
suspect d/t congestive cardiac failure.   
    
- Will repeat LFT.     
    
Microcytic hypochromic anemia :   
Likely iron deficiency, suspect dietary in origin.   
    
- Iron profile, Folate, B12, Peripheral smear, Reticulocyte count   
    
    
    
There is no height or weight on file to calculate BMI. Code Status:  FULL Surrogate Decision Maker: Nargis Ren [phone] Ben Portillo [phone]   
    
DVT Prophylaxis: Hep SQ and SCD's   
GI Prophylaxis: not indicated   
    
Baseline:  unknown   
              
    
Subjective: CHIEF COMPLAINT:  ECO for not caring for self.   
    
HISTORY OF PRESENT ILLNESS:     
Elzbieta Self is a 78 y.o.   male with no known past medical history who presents to ED d/t concerns for failure to care for self.   
    
    
I spoke with Nico Beal [phone], patient's sister. She called the police on him today because the patient wouldn't let her into his home. He stated he was tired and hungry and did not open the door. For the last 3 months, he has not been eating food. He has also been really weak and mostly been seated in the chair. Prior to that, he was chronic heavy drinker about 1 pint of whiskey. She is not aware of any drug use. She also reports memory loss, poor awareness of surroundings or day/night orientation that started last year. For example, when she would call him during the day time, he would think it is the middle of the night. He was apparently a part of [address] but he stopped following with appointments.   
    
Last November, he had acute pain in his left eye followed by loss of vision in that eye but he refused to go to a doctor   
    
    
Patient is calmly resting in bed.  Reports shortness of breath on exertion, orthopnea (sleeps on his side), swelling of his feet and decreased appetite. He feels 'weak' and unable to walk or carry out activities of daily living. He is oriented to self and can name his family members but not oriented to time place or situation.   
    
abn labs on admit: bun 31. bun/cr 25. bnp 20,057. trop 0.07.   
   
Heart Failure - Clinical Indications for Admission to Inpatient Care by Marjan Ryan  
 
   
Review Entered Review Status 10/28/2019 14:40 Completed  
   
Criteria Review Clinical Indications for Admission to Inpatient Care Most Recent : Marjan Ryan Most Recent Date: 10/28/2019 14:40:00 EDT   
(X) Admission is indicated by 1 or more of the following  (1) (2) (3) (4) (5) (6):   
   (X) Hemodynamic instability   
   10/28/2019 14:40:00 EDT by Marjan Ryan   
     lactic acid: 2.4, 3.0, 2.6, 1.9   
   (X) Dyspnea (above baseline) that persists despite emergency department and observation care treatment   
   10/28/2019 14:36:21 EDT by Marjan Ryan   
     Shortness of breath, orthopnea, PND, 3+ pedal edema, proBNP 20,057, CXR shows cardiomegaly, left sided pleural effusion, vascular congestion.  
   
LOC:Acute Adult-Extended Stay (10/26/2019) by Joyce Castro RN  
 
   
Review Entered Review Status 10/27/2019 14:49 In Primary  
   
Criteria Review REVIEW SUMMARY 
  
Patient: [facility] Review Number: [id number] Review Status: In Primary 
  
Condition Specific: Yes 
  
Condition Level Of Care Code: ACUTE Condition Level Of Care Description: Acute 
  
  
OUTCOMES Outcome Type: Primary 
  
  
  
REVIEW DETAILS 
  
Service Date: 10/26/2019 Admit Date: 10/16/2019 Product: [facility] Adult Subset: Extended Stay (Symptom or finding within 24h) ~--Admin,  [place] Aaron on 10- 02:49 PM--~ 
    10/26/19 Inpatient Medical   
    98.1,  99,   139/98   18 sat 100% nasal cannula + dyspnea on exertion, shortness of breath. No fevers, chills, appetite change, cough, sputum production, nausea, vomitting, diarrhea, constipation, chest pain, leg edema, abdominal pain, joint pain, rash, itching. Tolerating diet. Plan:  
    Patient has progressive dyspnea worsened compared to yesterday. Educated nursing to administer PRN Morphine and Furosemide for the same. I communicated with nephew Za Hays and addressed his concerns regarding end of life clinical course and management. Fe Saul thinks 'I don't think he has very long'. Comfort measures only Patient was transitioned to [address] [place] [address] after goals of care meeting with Palliative medicine. Family preference is [facility]. - Lorazepam 2 mg SL PRN for anxiety. - Morphine 10 mg PRN for pain/shortness of breath - Lasix 40 mg oral PRN for shortness of breath 
    - Oxygen for comfort as needed - Regular diet Dementia, new diagnosis :  
    Collateral history and clinical progression highly suggestive of dementia. MRI brain-No acute intracranial process seen [facility] RPR negative, B12 wnl, TSH elevated. He does not have medical decision making capacity. Was started on Donepezil and thiamine, levoythyroxine. After family meeting, transitioned to [address] [place] [address]. New onset Systolic Congestive Heart Failure, EF~20% Dyspnea on exertion d/t HFrEF & left pleural effusionECHO -echo shows extremely poor LV function, ejection fraction well under 20% with a spherical mass in the LV apex ,Possible long-standing granulomatous disease. Started on coreg, aldactone and Entresto by cards. Transitioned to [address] [place] [address]. PRN Lasix for comfort. Lasix  40mg po x 1 Haldol 2mg sl   x  1 Morphine  10mg/  5ml 
     
     
     
  
    (Excludes PO medications unless noted) 
  
Version: InterQual® 2019 Maria Antonia Olivares  © 2019 [facility] and/or one of its subsidiaries. All Rights Reserved. CPT only © 2018 [employer]. All Rights Reserved.

## 2019-10-30 NOTE — ACP (ADVANCE CARE PLANNING)
Advance Care Planning Note NAME: Fatmata Bennett :  1950 MRN:  [id number] Date/Time:  10/30/2019 1:06 PM 
 
Active Diagnoses: 
Hospital Problems  Never Reviewed Codes Class Noted POA Heart failure ([facility] [address] ICD-10-CM: I50.9 ICD-9-CM: 428.9  10/1/2019 Unknown Altered mental state ICD-10-CM: R41.82 
ICD-9-CM: 780.97  2019 Unknown These active diagnoses are of sufficient risk that focused discussion on advance care planning is indicated in order to allow the patient to thoughtfully consider personal goals of care, and if situations arise that prevent the ability to personally give input, to ensure appropriate representation of their personal desires for different levels and aggressiveness of care. Discussion:  
Code status addressed and wants to remain a DNR / DNI. Patient is non-interactive but family is at bedside and today requesting update on overall condition and prognosis. We reviewed the patient's wishes as well as the family wishes. The family confirms that the patient would want no heroic measures and that it is their desire that he be made comfortable in his \"final days\". Persons present and participating in discussion: Fatmata Bennett - who was non-participatory, Zofia Langford (patient's nephew who acts as family spokesman, Wily Thornton (patient's sister), Anabel Ferguson (patient's niece), Shahram Smith (SO to Anabel Ferguson), and Tye Galvan NP. Time Spent:  
Total time spent face-to-face in education and discussion:   30  minutes. Tye Galvan NP Hospitalist

## 2019-10-30 NOTE — PROGRESS NOTES
Pt calling out. Complaining of pain. Can't say where. R 22. RN sat with pt to try to console til meds take effect. ? Constipation/gassy PRN pain meds given.

## 2019-10-30 NOTE — PROGRESS NOTES
Care Plan reviewed Problem: Pressure Injury - Risk of 
Goal: *Prevention of pressure injury Description Document Yosvany Scale and appropriate interventions in the flowsheet. Outcome: Progressing Towards Goal 
Note:  
Pressure Injury Interventions: 
Sensory Interventions: Assess changes in LOC, Minimize linen layers, Turn and reposition approx. every two hours (pillows and wedges if needed) Moisture Interventions: Absorbent underpads, Offer toileting Q_hr Activity Interventions: Pressure redistribution bed/mattress(bed type) Mobility Interventions: Turn and reposition approx. every two hours(pillow and wedges) Nutrition Interventions: Document food/fluid/supplement intake Friction and Shear Interventions: HOB 30 degrees or less Problem: Patient Education: Go to Patient Education Activity Goal: Patient/Family Education Outcome: Progressing Towards Goal 
  
Problem: Falls - Risk of 
Goal: *Absence of Falls Description Document Laila Woodard Fall Risk and appropriate interventions in the flowsheet. Outcome: Progressing Towards Goal 
Note:  
Fall Risk Interventions: 
Mobility Interventions: Communicate number of staff needed for ambulation/transfer, Patient to call before getting OOB Mentation Interventions: Adequate sleep, hydration, pain control Medication Interventions: Evaluate medications/consider consulting pharmacy, Patient to call before getting OOB Elimination Interventions: Toileting schedule/hourly rounds Problem: Patient Education: Go to Patient Education Activity Goal: Patient/Family Education Outcome: Progressing Towards Goal 
  
Problem: Fatigue-Palliative Care Goal: *PALLIATIVE CARE-Alleviation of fatigue Outcome: Progressing Towards Goal

## 2019-10-31 NOTE — PROGRESS NOTES
Palliative Medicine [place]: [phone] ([phone]) [facility]: [phone] ([phone]) Code Status: DNR Advance Care Planning: 
No AMD on file Shon Peters Primary Decision Maker: Miriam Leyva - Sister - [phone] Primary Decision Maker: Adore Gan - Brother - [phone] Supplemental (Other) Decision Maker: Cami Macias - Other Relative - [phone] Patient sister Chele Kamara deferred decision making to siblings Nabil Bello and Blayne fleming. Sister Lottie Lepe has brain injury and unable to participate in [address] for patient. Dawn Garrett and his wife Neeta Ayalas very involved. Family leans on [employer] quite a bit. Per chart: 
Desmond Chaudhary is a 78 y.o. with a past history of etoh use, recent loss of vision in L eye who was admitted on 9/30/2019 from home after sister Vamshi Mask called police because for the past several months has not been eating well, caring for himself, more confused. Has been refusing medical care. Upon admission found to have new onset systolic CHF (echo 95/8/26 w/ EF <15%, diffuse hypokinesis of LV ventricle as well as thrombus/mass of L ventricle). Medications adjusted. 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 On Aricept and thiamine. EEG w/ slowing. Based on hx w/ probably ongoing dementia rather than a reversible form of confusion. Family meeting on 10.10.19 resulted in transition to comfort measures. Current medical issues leading to Palliative Medicine involvement include: care decisions. Chart reviewed. Patient discussed in IDT. RN reported patient has been more comfortable since pain meds adjusted this week. Family have been to visit this week including nephew Colin Rose. Patient still waiting LTC placement with hospice in [place] near family/has medicaid application pending. I visited patient in his room. He was sound asleep and I did not wake him at this time. Will continue to follow for support. Thank you for the opportunity to be involved in the care of Mr. Idris Rocha and his family. Chelle Chacon, AMARI, Supervisee in Social Work Palliative Medicine  [phone]

## 2019-10-31 NOTE — PROGRESS NOTES
Hospitalist Progress Note NAME: Desmond Chaudhary :  1950 MRN:  [id number] Assessment / Plan: 
Patient is comfort measures, see ACP for latest family meeting communication 
  
Dementia, newly diagnosed HFrEF (LVEF 20%) Pleural effusion Elevated troponin - Patient appears comfortable. - on morphin, haldol, ativan and scopolamine  
  
Constipation - More aggressive bowel regimen started 10/29/19. 
- Continue Miralax 17 gm po daily. Had large bm prior to fleets 
  
  
25.0 - 29.9 Overweight / Body mass index is 26.78 kg/m². 
  
Code status: DNR Prophylaxis: Not indicated Recommended Disposition: Comfort care Subjective: Chief Complaint / Reason for Physician Visit \"comfortable good appetite\". Discussed with RN events overnight. Review of Systems: 
Symptom Y/N Comments  Symptom Y/N Comments Fever/Chills    Chest Pain Poor Appetite    Edema Cough    Abdominal Pain Sputum    Joint Pain SOB/LANDA    Pruritis/Rash Nausea/vomit    Tolerating PT/OT Diarrhea    Tolerating Diet Constipation    Other Could NOT obtain due to:   
 
Objective: VITALS:  
Last 24hrs VS reviewed since prior progress note. Most recent are: 
Patient Vitals for the past 24 hrs: 
 Pulse Resp SpO2  
10/30/19 2157 (!) 113 14 100 % Intake/Output Summary (Last 24 hours) at 10/31/2019 1200 Last data filed at 10/31/2019 0830 Gross per 24 hour Intake 720 ml Output  Net 720 ml PHYSICAL EXAM: 
General: WD, WN. Alert, cooperative, no acute distress   
EENT:  EOMI. Anicteric sclerae. MMM Resp:  CTA bilaterally, no wheezing or rales. No accessory muscle use CV:  Regular  rhythm,  No edema GI:  Soft, Non distended, Non tender.  +Bowel sounds Neurologic:  Alert and oriented X 3, normal speech, Psych:   Good insight. Not anxious nor agitated Skin:  No rashes. No jaundice Reviewed most current lab test results and cultures  YES 
 Reviewed most current radiology test results   YES Review and summation of old records today    NO Reviewed patient's current orders and MAR    YES 
PMH/SH reviewed - no change compared to H&P 
________________________________________________________________________ Care Plan discussed with: 
  Comments Patient Family RN Care Manager Consultant Multidiciplinary team rounds were held today with , nursing, pharmacist and clinical coordinator. Patient's plan of care was discussed; medications were reviewed and discharge planning was addressed. ________________________________________________________________________ Total NON critical care TIME:  20   Minutes Total CRITICAL CARE TIME Spent:   Minutes non procedure based Comments >50% of visit spent in counseling and coordination of care    
________________________________________________________________________ Areli Florence DO  
 
Procedures: see electronic medical records for all procedures/Xrays and details which were not copied into this note but were reviewed prior to creation of Plan. LABS: 
I reviewed today's most current labs and imaging studies. Pertinent labs include: No results for input(s): WBC, HGB, HCT, PLT, HGBEXT, HCTEXT, PLTEXT in the last 72 hours. No results for input(s): NA, K, CL, CO2, GLU, BUN, CREA, CA, MG, PHOS, ALB, TBIL, TBILI, SGOT, ALT, INR, INREXT in the last 72 hours.  
 
Signed: Areli Florence DO

## 2019-10-31 NOTE — PROGRESS NOTES
Case Management nurse was asking SN if pt would benefit from being on hospice services at this time and SN did agree that hospice services would be very beneficial for the pt at time.

## 2019-10-31 NOTE — PROGRESS NOTES
1933: Bedside and Verbal shift change report given to Chi Robles (oncoming nurse) by Ruel Whitaker (offgoing nurse). Report included the following information SBAR. Patient resting in bed during report. Bed alarm on.

## 2019-11-01 NOTE — PROGRESS NOTES
Hospitalist Progress Note NAME: Crissy Sloan :  1950 MRN:  [id number] Assessment / Plan: 
Patient is comfort measures, see ACP for latest family meeting communication 
  
Dementia, newly diagnosed HFrEF (LVEF 20%) Pleural effusion Elevated troponin - Patient appears comfortable. - on morphin, haldol, ativan and scopolamine  
  
Constipation - More aggressive bowel regimen started 10/29/19. 
- Continue Miralax 17 gm po daily. Had large bm prior to fleets 
  
  
25.0 - 29.9 Overweight / Body mass index is 26.78 kg/m². 
  
Code status: DNR Prophylaxis: Not indicated Recommended Disposition: Comfort care Subjective: Chief Complaint / Reason for Physician Visit \"no discomfort at rest\". Discussed with RN events overnight. Review of Systems: 
Symptom Y/N Comments  Symptom Y/N Comments Fever/Chills    Chest Pain Poor Appetite    Edema Cough    Abdominal Pain Sputum    Joint Pain SOB/LANDA    Pruritis/Rash Nausea/vomit    Tolerating PT/OT Diarrhea    Tolerating Diet Constipation    Other Could NOT obtain due to:   
 
Objective: VITALS:  
Last 24hrs VS reviewed since prior progress note. Most recent are: 
Patient Vitals for the past 24 hrs: 
 Temp Pulse Resp BP SpO2  
19 0818 98.1 °F (36.7 °C) (!) 109 10 114/83 100 % 19 0740     100 % 10/31/19 2036 98.2 °F (36.8 °C) (!) 109 20 117/81 100 % 10/31/19 1702     100 % Intake/Output Summary (Last 24 hours) at 2019 0331 Last data filed at 2019 0830 Gross per 24 hour Intake 480 ml Output  Net 480 ml PHYSICAL EXAM: 
General: WD, WN. Alert, cooperative, no acute distress   
EENT:  EOMI. Anicteric sclerae. MMM Resp:  CTA bilaterally, no wheezing or rales. No accessory muscle use CV:  Regular  rhythm,  No edema GI:  Soft, Non distended, Non tender.  +Bowel sounds Neurologic:  Alert and oriented X 3, normal speech,  
 Psych:   Good insight. Not anxious nor agitated Skin:  No rashes. No jaundice Reviewed most current lab test results and cultures  YES Reviewed most current radiology test results   YES Review and summation of old records today    NO Reviewed patient's current orders and MAR    YES 
PMH/SH reviewed - no change compared to H&P 
________________________________________________________________________ Care Plan discussed with: 
  Comments Patient Family RN Care Manager Consultant Multidiciplinary team rounds were held today with , nursing, pharmacist and clinical coordinator. Patient's plan of care was discussed; medications were reviewed and discharge planning was addressed. ________________________________________________________________________ Total NON critical care TIME:  20   Minutes Total CRITICAL CARE TIME Spent:   Minutes non procedure based Comments >50% of visit spent in counseling and coordination of care    
________________________________________________________________________ Josef Ingram MD  
 
Procedures: see electronic medical records for all procedures/Xrays and details which were not copied into this note but were reviewed prior to creation of Plan. LABS: 
I reviewed today's most current labs and imaging studies. Pertinent labs include: No results for input(s): WBC, HGB, HCT, PLT, HGBEXT, HCTEXT, PLTEXT, HGBEXT, HCTEXT, PLTEXT in the last 72 hours. No results for input(s): NA, K, CL, CO2, GLU, BUN, CREA, CA, MG, PHOS, ALB, TBIL, TBILI, SGOT, ALT, INR, INREXT, INREXT in the last 72 hours.  
 
Signed: Josef Ingram MD

## 2019-11-01 NOTE — PROGRESS NOTES
1916: Bedside and Verbal shift change report given to Mo Siddiqi (oncoming nurse) by David Chavez RN (offgoing nurse). Report included the following information SBAR. Patient resting during report.

## 2019-11-01 NOTE — PROGRESS NOTES
Discuss in rounds patient treatment plan. Per RN patient on 2l of oxygen. Discuss possible hospice on Tuesday CM ask for consult and referral sent to [facility] today. [address] [place] [address] 
[phone]

## 2019-11-01 NOTE — PROGRESS NOTES
Problem: Pressure Injury - Risk of 
Goal: *Prevention of pressure injury Description Document Yosvany Scale and appropriate interventions in the flowsheet. Outcome: Progressing Towards Goal 
Note:  
Pressure Injury Interventions: 
Sensory Interventions: Avoid rigorous massage over bony prominences, Float heels, Keep linens dry and wrinkle-free, Minimize linen layers, Turn and reposition approx. every two hours (pillows and wedges if needed) Moisture Interventions: Absorbent underpads, Apply protective barrier, creams and emollients, Minimize layers, Moisture barrier Activity Interventions: Pressure redistribution bed/mattress(bed type) Mobility Interventions: HOB 30 degrees or less, Turn and reposition approx. every two hours(pillow and wedges) Nutrition Interventions: Offer support with meals,snacks and hydration Friction and Shear Interventions: Apply protective barrier, creams and emollients, HOB 30 degrees or less, Minimize layers Problem: Patient Education: Go to Patient Education Activity Goal: Patient/Family Education Outcome: Progressing Towards Goal 
  
Problem: Falls - Risk of 
Goal: *Absence of Falls Description Document Flor C[place]ton Fall Risk and appropriate interventions in the flowsheet. Outcome: Progressing Towards Goal 
Note:  
Fall Risk Interventions: 
Mobility Interventions: Bed/chair exit alarm, Communicate number of staff needed for ambulation/transfer, Patient to call before getting OOB Mentation Interventions: Bed/chair exit alarm, Door open when patient unattended, More frequent rounding, Room close to nurse's station Medication Interventions: Bed/chair exit alarm, Patient to call before getting OOB Elimination Interventions: Call light in reach, Patient to call for help with toileting needs, Stay With Me (per policy), Toilet paper/wipes in reach, Toileting schedule/hourly rounds Problem: Patient Education: Go to Patient Education Activity Goal: Patient/Family Education Outcome: Progressing Towards Goal 
  
Problem: Fatigue-Palliative Care Goal: *PALLIATIVE CARE-Alleviation of fatigue Outcome: Progressing Towards Goal 
  
Problem: General Medical Care Plan Goal: *Vital signs within specified parameters Outcome: Progressing Towards Goal 
Goal: *Labs within defined limits Outcome: Progressing Towards Goal 
Goal: *Absence of infection signs and symptoms Outcome: Progressing Towards Goal 
Goal: *Optimal pain control at patient's stated goal 
Outcome: Progressing Towards Goal 
Goal: *Skin integrity maintained Outcome: Progressing Towards Goal 
Goal: *Fluid volume balance Outcome: Progressing Towards Goal 
Goal: *Optimize nutritional status Outcome: Progressing Towards Goal 
Goal: *Anxiety reduced or absent Outcome: Progressing Towards Goal 
Goal: *Progressive mobility and function (eg: ADL's) Outcome: Progressing Towards Goal 
  
Problem: Patient Education: Go to Patient Education Activity Goal: Patient/Family Education Outcome: Progressing Towards Goal

## 2019-11-01 NOTE — PROGRESS NOTES
Bedside and Verbal shift change report given to GABY Ceballos and Barbra Rodriguez, RN (oncoming nurse) by Buren Bosworth, RN (offgoing nurse). Report included the following information MAR.  
 
0919 - Pt resting quietly. Given scheduled Morphine and Miralax. 1015 - IDR's done on pt. Hospice consult recommended by . 1046 - Pt turned onto left side. Pt now resting quietly. Bedside and Verbal shift change report given to Buren Bosworth, RN (oncoming nurse) by Barbra Rodriguez RN (offgoing nurse). Report included the following information Kardex and MAR.

## 2019-11-02 NOTE — PROGRESS NOTES
Transition of care . Continue to follow for disposition. Met and talked to nephew last week. Concerned about decline in patient's condition. The Hospitalist also met and talked to him. Discussed about the Medicaid process. Plans for Hospice/LTC. MD referral for Hospice- To evaluate the patient here on site. One \[facility]\"" CAPO RN  
[phone]

## 2019-11-02 NOTE — PROGRESS NOTES
Hospitalist Progress Note NAME: Addis Fink :  1950 MRN:  [id number] Assessment / Plan: 
Patient is comfort measures, see ACP for latest family meeting communication 
  
Dementia, newly diagnosed HFrEF (LVEF 20%) Pleural effusion Elevated troponin - Patient appears comfortable. - on morphin, haldol, ativan and scopolamine  
  
Constipation - More aggressive bowel regimen started 10/29/19. 
- Continue Miralax 17 gm po daily. Had large bm prior to fleets 
  
  
25.0 - 29.9 Overweight / Body mass index is 26.78 kg/m². 
  
Code status: DNR Prophylaxis: Not indicated Recommended Disposition: Comfort care Subjective: Chief Complaint / Reason for Physician Visit \"no distress\". Discussed with RN events overnight. Review of Systems: 
Symptom Y/N Comments  Symptom Y/N Comments Fever/Chills    Chest Pain Poor Appetite    Edema Cough    Abdominal Pain Sputum    Joint Pain SOB/LANDA    Pruritis/Rash Nausea/vomit    Tolerating PT/OT Diarrhea    Tolerating Diet Constipation    Other Could NOT obtain due to:   
 
Objective: VITALS:  
Last 24hrs VS reviewed since prior progress note. Most recent are: 
Patient Vitals for the past 24 hrs: 
 Temp Pulse Resp BP SpO2  
19 1957 98.8 °F (37.1 °C) 95 16 124/88 100 % 19 1532 98.5 °F (36.9 °C) (!) 108 16 117/84 100 % 19 1315 97.5 °F (36.4 °C) (!) 107 14 117/73 100 % Intake/Output Summary (Last 24 hours) at 2019 1128 Last data filed at 2019 8327 Gross per 24 hour Intake 0 ml Output  Net 0 ml PHYSICAL EXAM: 
General: WD, WN. Alert, cooperative, no acute distress   
EENT:  EOMI. Anicteric sclerae. MMM Resp:  CTA bilaterally, no wheezing or rales. No accessory muscle use CV:  Regular  rhythm,  No edema GI:  Soft, Non distended, Non tender.  +Bowel sounds Neurologic:  Alert and oriented X 3, normal speech,  
 Psych:   Good insight. Not anxious nor agitated Skin:  No rashes. No jaundice Reviewed most current lab test results and cultures  YES Reviewed most current radiology test results   YES Review and summation of old records today    NO Reviewed patient's current orders and MAR    YES 
PMH/SH reviewed - no change compared to H&P 
________________________________________________________________________ Care Plan discussed with: 
  Comments Patient Family RN Care Manager Consultant Multidiciplinary team rounds were held today with , nursing, pharmacist and clinical coordinator. Patient's plan of care was discussed; medications were reviewed and discharge planning was addressed. ________________________________________________________________________ Total NON critical care TIME:  20   Minutes Total CRITICAL CARE TIME Spent:   Minutes non procedure based Comments >50% of visit spent in counseling and coordination of care    
________________________________________________________________________ Carmelita Yi MD  
 
Procedures: see electronic medical records for all procedures/Xrays and details which were not copied into this note but were reviewed prior to creation of Plan. LABS: 
I reviewed today's most current labs and imaging studies. Pertinent labs include: No results for input(s): WBC, HGB, HCT, PLT, HGBEXT, HCTEXT, PLTEXT, HGBEXT, HCTEXT, PLTEXT in the last 72 hours. No results for input(s): NA, K, CL, CO2, GLU, BUN, CREA, CA, MG, PHOS, ALB, TBIL, TBILI, SGOT, ALT, INR, INREXT, INREXT in the last 72 hours.  
 
Signed: Carmelita Yi MD

## 2019-11-02 NOTE — PROGRESS NOTES
Bedside/ Verbal shift change report given to Shelly Jacobson A LPN (oncoming nurse) by Kena Tomas  RN (offgoing nurse). Pt resting in bed with eyes closed, responds to voice. Voices no concerns

## 2019-11-03 NOTE — PROGRESS NOTES
Bedside and Verbal shift change report given to [address] [place] [address], [facility] nurse) by Rakel Hamilton RN/Gretchen GUTHRIE (offgoing nurse). Pt is resting in bed with eyes closed.

## 2019-11-03 NOTE — PROGRESS NOTES
Bedside and Verbal shift change report given to Juventino Buckley RN/Gretchen GUTHRIE (oncoming nurse) by Lucia Wolf LPN (offgoing nurse). Report included the following information SBAR, Kardex, Procedure Summary, Intake/Output, MAR, Accordion, Recent Results and Med Rec Status

## 2019-11-03 NOTE — PROGRESS NOTES
Hospitalist Progress Note NAME: Yohana Carmona :  1950 MRN:  [id number] Assessment / Plan: 
Patient is comfort measures, see ACP for latest family meeting communication 
  
Dementia, newly diagnosed HFrEF (LVEF 20%) Pleural effusion Elevated troponin - Patient appears comfortable. - on morphin, haldol, ativan and scopolamine  
  
Constipation - More aggressive bowel regimen started 10/29/19. 
- Continue Miralax 17 gm po daily. Had large bm prior to fleets 
  
  
25.0 - 29.9 Overweight / Body mass index is 26.78 kg/m². 
  
Code status: DNR Prophylaxis: Not indicated Recommended Disposition: Comfort care Subjective: Chief Complaint / Reason for Physician Visit \"feeling ok today, no discomfort \". Discussed with RN events overnight. Review of Systems: 
Symptom Y/N Comments  Symptom Y/N Comments Fever/Chills    Chest Pain Poor Appetite    Edema Cough    Abdominal Pain Sputum    Joint Pain SOB/LANDA    Pruritis/Rash Nausea/vomit    Tolerating PT/OT Diarrhea    Tolerating Diet Constipation    Other Could NOT obtain due to:   
 
Objective: VITALS:  
Last 24hrs VS reviewed since prior progress note. Most recent are: 
Patient Vitals for the past 24 hrs: 
 Temp Pulse Resp BP SpO2  
19 0759 98.7 °F (37.1 °C) (!) 106 16 107/75 100 % 19 2000 96.3 °F (35.7 °C) 88 20 118/68 94 % Intake/Output Summary (Last 24 hours) at 11/3/2019 1346 Last data filed at 2019 1827 Gross per 24 hour Intake 240 ml Output  Net 240 ml PHYSICAL EXAM: 
General: WD, WN. Alert, cooperative, no acute distress   
EENT:  EOMI. Anicteric sclerae. MMM Resp:  CTA bilaterally, no wheezing or rales. No accessory muscle use CV:  Regular  rhythm,  No edema GI:  Soft, Non distended, Non tender.  +Bowel sounds Neurologic:  Alert and oriented X 3, normal speech, Psych:   Good insight. Not anxious nor agitated Skin:  No rashes. No jaundice Reviewed most current lab test results and cultures  YES Reviewed most current radiology test results   YES Review and summation of old records today    NO Reviewed patient's current orders and MAR    YES 
PMH/SH reviewed - no change compared to H&P 
________________________________________________________________________ Care Plan discussed with: 
  Comments Patient Family RN Care Manager Consultant Multidiciplinary team rounds were held today with , nursing, pharmacist and clinical coordinator. Patient's plan of care was discussed; medications were reviewed and discharge planning was addressed. ________________________________________________________________________ Total NON critical care TIME:  20   Minutes Total CRITICAL CARE TIME Spent:   Minutes non procedure based Comments >50% of visit spent in counseling and coordination of care    
________________________________________________________________________ Lakhwinder White MD  
 
Procedures: see electronic medical records for all procedures/Xrays and details which were not copied into this note but were reviewed prior to creation of Plan. LABS: 
I reviewed today's most current labs and imaging studies. Pertinent labs include: No results for input(s): WBC, HGB, HCT, PLT, HGBEXT, HCTEXT, PLTEXT, HGBEXT, HCTEXT, PLTEXT in the last 72 hours. No results for input(s): NA, K, CL, CO2, GLU, BUN, CREA, CA, MG, PHOS, ALB, TBIL, TBILI, SGOT, ALT, INR, INREXT, INREXT in the last 72 hours.  
 
Signed: Lakhwinder White MD

## 2019-11-03 NOTE — PROGRESS NOTES
Problem: Pressure Injury - Risk of 
Goal: *Prevention of pressure injury Description Document Yosvany Scale and appropriate interventions in the flowsheet. Outcome: Progressing Towards Goal 
Note:  
Pressure Injury Interventions: 
Sensory Interventions: Keep linens dry and wrinkle-free, Turn and reposition approx. every two hours (pillows and wedges if needed) Moisture Interventions: Check for incontinence Q2 hours and as needed Activity Interventions: Increase time out of bed, Pressure redistribution bed/mattress(bed type) Mobility Interventions: Turn and reposition approx. every two hours(pillow and wedges) Nutrition Interventions: Offer support with meals,snacks and hydration Friction and Shear Interventions: Apply protective barrier, creams and emollients Problem: Patient Education: Go to Patient Education Activity Goal: Patient/Family Education Outcome: Progressing Towards Goal 
  
Problem: Falls - Risk of 
Goal: *Absence of Falls Description Document T[place]ford Pickles Fall Risk and appropriate interventions in the flowsheet. Outcome: Progressing Towards Goal 
Note:  
Fall Risk Interventions: 
Mobility Interventions: Mechanical lift Mentation Interventions: Door open when patient unattended, More frequent rounding Medication Interventions: Evaluate medications/consider consulting pharmacy Elimination Interventions: Call light in reach Problem: Falls - Risk of 
Goal: *Absence of Falls Description Document T[place] Pickles Fall Risk and appropriate interventions in the flowsheet. Outcome: Progressing Towards Goal 
Note:  
Fall Risk Interventions: 
Mobility Interventions: Mechanical lift Mentation Interventions: Door open when patient unattended, More frequent rounding Medication Interventions: Evaluate medications/consider consulting pharmacy Elimination Interventions: Call light in reach Problem: Patient Education: Go to Patient Education Activity Goal: Patient/Family Education Outcome: Progressing Towards Goal

## 2019-11-03 NOTE — PROGRESS NOTES
Nursing Tech called SN down to the room to evaluate pt for pain. SN asked pt if he was having pain and the pt did nod his head indicating he was in pain.

## 2019-11-03 NOTE — HOSPICE
Hospice Liaison Nurse: 
Toan Israel, listed as patient's sister at [phone]. Voicemail left that hospice RN was visiting with patient, and was trying to reach her to arrange a time to meet re hospice care and services. Left number [phone], main number for [facility]. Offered for Taniya Garcia to call Liaison nurse back to review plan of care over the phone, if she is available to do so. Otherwise, hospice will attempt to reach family again tomorrow, Monday. After chart review, and speaking to unit nurse, Bartolome Dobson RN, patient is currently receiving pain medications sublingual with good results. Dose is currently scheduled and no PRN dose's have been needed to control pain. Patient does not currently meet [facility] LOC for inpatient hospice care. Pt denies pain at this time, however, it is unclear as to his mental capacity. Pt is alert Attempted to reach Dr. Martha Valdez at #[phone] and at [phone] to discuss family's plan for disposition and discharge. Voicemail left. Attempted to reach , however, CM has left for the day per unit RN. Jonh Ocampo RN, [facility] Nurse Liaison [phone] Mobile [phone] Office 17:00: Dr. Tye Riley returned this nurse call. Dr. Tye Riley states that he does not know what family goals or plan of care are. He said that he has not had a conversation with patient or family re hospice care, as he is new to this patient since Friday. Plan remains the same; however, requests that Medical Team discuss disposition and discharge plans with family, as well as goals of care and hospice services. Hospice will reach out to family on Monday. Please call Hospice with updates or questions: 
[phone] Jonh Ocampo RN, [facility] Nurse Liaison [phone] Mobile [phone] Office

## 2019-11-03 NOTE — HOSPICE
[employer] Good Help to Those in Need 
[phone] Patient Name: Val Khalil YOB: 1950 Age: 78 y.o. [employer] RN Note:  Hospice consult received, reviewing chart. Will follow up with Unit Nurse and Care Manager to discuss plan of care, patient status and discharge disposition within the hour. Thank you for the opportunity to be of service to this patient. Lg Tripathi RN, [facility] Hospice Nurse Liaison [phone] [place] [phone] Office

## 2019-11-04 NOTE — PROGRESS NOTES
Hospitalist Progress Note NAME: Shy Yun :  1950 MRN:  [id number] Assessment / Plan: 
Patient is comfort measures, see ACP for latest family meeting communication 
  
Dementia, newly diagnosed HFrEF (LVEF 20%) Pleural effusion Elevated troponin - Patient appears comfortable. - on morphin, haldol, ativan and scopolamine  
  
Constipation - More aggressive bowel regimen started 10/29/19. 
- Continue Miralax 17 gm po daily. Had large bm prior to fleets 
  
  
25.0 - 29.9 Overweight / Body mass index is 26.78 kg/m². 
  
Code status: DNR Prophylaxis: Not indicated Recommended Disposition: Comfort care, Hospice nurse will discuss next step in plan of care with family, she will call me when conversation done. Subjective: Chief Complaint / Reason for Physician Visit \"none \". Discussed with RN events overnight. Review of Systems: 
Symptom Y/N Comments  Symptom Y/N Comments Fever/Chills    Chest Pain Poor Appetite    Edema Cough    Abdominal Pain Sputum    Joint Pain SOB/LANDA    Pruritis/Rash Nausea/vomit    Tolerating PT/OT Diarrhea    Tolerating Diet Constipation    Other Could NOT obtain due to:   
 
Objective: VITALS:  
Last 24hrs VS reviewed since prior progress note. Most recent are: 
Patient Vitals for the past 24 hrs: 
 Temp Pulse Resp BP SpO2  
19 1334  (!) 114 (!) 36 (!) 139/97 100 % 19 0850 97 °F (36.1 °C) (!) 109 26 132/88 96 % 19 0743     99 % 19 2058 98.2 °F (36.8 °C) 100 24 117/70 99 % Intake/Output Summary (Last 24 hours) at 2019 1351 Last data filed at 2019 0900 Gross per 24 hour Intake 240 ml Output  Net 240 ml PHYSICAL EXAM: 
General: WD, WN. Alert, cooperative, no acute distress   
EENT:  EOMI. Anicteric sclerae. MMM Resp:  CTA bilaterally, no wheezing or rales. No accessory muscle use CV:  Regular  rhythm,  No edema GI:  Soft, Non distended, Non tender.  +Bowel sounds Neurologic:  Alert and oriented X 3, normal speech, Psych:   Good insight. Not anxious nor agitated Skin:  No rashes. No jaundice Reviewed most current lab test results and cultures  YES Reviewed most current radiology test results   YES Review and summation of old records today    NO Reviewed patient's current orders and MAR    YES 
PMH/SH reviewed - no change compared to H&P 
________________________________________________________________________ Care Plan discussed with: 
  Comments Patient Family RN Care Manager Consultant Multidiciplinary team rounds were held today with , nursing, pharmacist and clinical coordinator. Patient's plan of care was discussed; medications were reviewed and discharge planning was addressed. ________________________________________________________________________ Total NON critical care TIME:  20   Minutes Total CRITICAL CARE TIME Spent:   Minutes non procedure based Comments >50% of visit spent in counseling and coordination of care    
________________________________________________________________________ Gurwinder Woods MD  
 
Procedures: see electronic medical records for all procedures/Xrays and details which were not copied into this note but were reviewed prior to creation of Plan. LABS: 
I reviewed today's most current labs and imaging studies. Pertinent labs include: No results for input(s): WBC, HGB, HCT, PLT, HGBEXT, HCTEXT, PLTEXT, HGBEXT, HCTEXT, PLTEXT in the last 72 hours. No results for input(s): NA, K, CL, CO2, GLU, BUN, CREA, CA, MG, PHOS, ALB, TBIL, TBILI, SGOT, ALT, INR, INREXT, INREXT in the last 72 hours.  
 
Signed: Gurwinder Woods MD

## 2019-11-04 NOTE — HOSPICE
Spoke to [employer] Corporation[employer] by telephone. She understands that the patient does not meet GIP criteria at this time. She reports that the family cannot take the patient home and they are in the process of getting LTC Medicaid coverage so that patient can be discharged to LTC. She will call us when the patient has a disposition. Dr. Griselda Costain notified by telephone and verbalizes understanding of above. Thank you for the opportunity to serve this patient and her family.

## 2019-11-04 NOTE — PROGRESS NOTES
Bedside shift change report given to GABY Wilkerson/JERRI Kulkarni (oncoming nurse) by Bárbara Batista (offgoing nurse). Report included the following information SBAR, Kardex, Intake/Output, MAR and Recent Results.

## 2019-11-04 NOTE — PROGRESS NOTES
Maren Park, patient nephew called to check on the status of Mr. Kishore Larson. He is authorized family member to receive information. Informed of any changes in patient medical status, confirmed that family will be coming to hospital on 11/5/19 and confirmed best phone number to reach him ( [phone]).   
 
Briseyda Zazueta LPN

## 2019-11-05 NOTE — INTERDISCIPLINARY ROUNDS
Discuss in rounds patient care. RN concern states patient decline significantly over night. Per LPN patient did not eat breakfast this morning difficulty swallowing. MD ask CM to contact hospice after rounds for possible GIP. [address] [place] [address] 
[phone]

## 2019-11-05 NOTE — PROGRESS NOTES
Patient no longer eating/drinking, unable to swallow. Not responding; moaning with repositioning, respirations at 26/min.   
Message left for hospice nurse by CM

## 2019-11-05 NOTE — PROGRESS NOTES
Hospice RN and patient nephew at bedside. Plan to d/c to hospice house. Morphine and Ativan prn given as ordered

## 2019-11-05 NOTE — PROGRESS NOTES
CM contact  Hospice [phone]  spoke with Yue Barrera ask to speak with liaison RN Gurwinder Rasmussen. Inform hospice needs to come and see patient. Patient has decline significantly over night per RN note \"Pt is now only responding to voice. Opens eyes with coaching. Mouth care done. No speech. Not swallowing. ? meds switch to rectal. Moaning with repositioning\". CM waiting on return call from hospice liaison. [facility] 
[phone]

## 2019-11-05 NOTE — DISCHARGE SUMMARY
Hospitalist Discharge Summary Patient ID: 
General Caicedo [id number] 
74 y.o. 
1950 9/30/2019 PCP on record: None Admit date: 9/30/2019 Discharge date and time: 11.5.19 DISCHARGE DIAGNOSIS: 
 
 
CONSULTATIONS: 
IP CONSULT TO PSYCHIATRY Excerpted HPI from H&P of Eden López MD: 
 
  
HISTORY OF PRESENT ILLNESS:    
General Caicedo is a 78 y.o.  male with no known past medical history who presents to ED d/t concerns for failure to care for self.  
  
  
I spoke with Ann Ferguson [phone], patient's sister. She called the police on him today because the patient wouldn't let her into his home. He stated he was tired and hungry and did not open the door. For the last 3 months, he has not been eating food. He has also been really weak and mostly been seated in the chair. Prior to that, he was chronic heavy drinker about 1 pint of whiskey. She is not aware of any drug use. She also reports memory loss, poor awareness of surroundings or day/night orientation that started last year. For example, when she would call him during the day time, he would think it is the middle of the night. He was apparently a part of [employer]  but he stopped following with appointments.  
  
Last November, he had acute pain in his left eye followed by loss of vision in that eye but he refused to go to a doctor  
  
  
Patient is calmly resting in bed. Reports shortness of breath on exertion, orthopnea (sleeps on his side), swelling of his feet and decreased appetite. He feels 'weak' and unable to walk or carry out activities of daily living. He is oriented to self and can name his family members but not oriented to time place or situation.  
  
  
 
______________________________________________________________________ DISCHARGE SUMMARY/HOSPITAL COURSE:  for full details see H&P, daily progress notes, labs, consult notes.   
Patient is comfort measures, see ACP for latest family meeting communication 
  
Dementia, newly diagnosed HFrEF (LVEF 20%) Pleural effusion Elevated troponin - Patient appears comfortable. - on morphin, haldol, ativan and scopolamine  
  
Constipation - More aggressive bowel regimen started 10/29/19. 
- Continue Miralax 17 gm po daily.   
Had large bm prior to fleets 
  
  
25.0 - 29.9 Overweight / Body mass index is 26.78 kg/m². 
  
Code status: DNR Prophylaxis: Not indicated Recommended Disposition: Comfort care, Hospice nurse will discuss next step in plan of care with family, she will call me when conversation done. 11.5.19: called [facility] to see patient today 
 
 
 
 
_______________________________________________________________________ Patient seen and examined by me on discharge day. Pertinent Findings: 
Gen:    Not in distress Chest: Clear lungs CVS:   Regular rhythm. No edema Abd:  Soft, not distended, not tender Neuro:  Response to verbal stimuli, no distress  
_______________________________________________________________________ DISCHARGE MEDICATIONS:  
There are no discharge medications for this patient. Patient Follow Up Instructions: Activity: Bedrest 
Diet: Comfort feeding Wound Care: Keep wound clean and dry Follow-up with [facility] as soon as discharged Follow-up tests/labs none Follow-up Information Follow up With Specialties Details Why Contact Info [facility] On 11/5/2019 Hospice House [address]- [phone] - [phone] [place] [address] [phone] None    None ([phone]) Patient stated that they have no PCP 
  
  
 
________________________________________________________________ Risk of deterioration: discharged to hospice Condition at Discharge: no distress discharged to hospice  
__________________________________________________________________ Disposition IP Hospice ____________________________________________________________________ Code Status: DNR/DNI 
___________________________________________________________________ Total time in minutes spent coordinating this discharge (includes going over instructions, follow-up, prescriptions, and preparing report for sign off to her PCP) :  30 minutes Signed: 
Devin Shane MD

## 2019-11-05 NOTE — PROGRESS NOTES
Bedside and Verbal shift change report given to [facility] RN/Amena (oncoming nurse) by Juan Worthy LPN (offgoing nurse). Report included the following information SBAR, Kardex, Procedure Summary, Intake/Output, MAR, Accordion, Recent Results and Med Rec Status

## 2019-11-05 NOTE — PROGRESS NOTES
Pt rounds. Pt is now only responding to voice. Opens eyes with coaching. Mouth care done. No speech. Not swallowing. ? meds switch to rectal. Moaning with repositioning.

## 2019-11-05 NOTE — PROGRESS NOTES
Report given to [facility]. TRANSFER - OUT REPORT: 
 
Verbal report given to Vicky(name) on Kirsten Tony  being transferred to [facility](unit) for change in patient condition(decline-hospice) Report consisted of patients Situation, Background, Assessment and  
Recommendations(SBAR). Information from the following report(s) SBAR, Kardex, Procedure Summary, Intake/Output, MAR, Accordion, Recent Results and Med Rec Status was reviewed with the receiving nurse. Lines:  
Peripheral IV 11/05/19 Right Antecubital (Active) Site Assessment Clean, dry, & intact 11/5/2019  3:17 PM  
Phlebitis Assessment 0 11/5/2019  3:17 PM  
Infiltration Assessment 0 11/5/2019  3:17 PM  
Dressing Status Clean, dry, & intact 11/5/2019  3:17 PM  
Dressing Type Tape;Transparent 11/5/2019  3:17 PM  
Hub Color/Line Status Capped 11/5/2019  3:17 PM  
  
 
Opportunity for questions and clarification was provided. Patient transported with: 
 O2 @ 2 liters

## 2019-11-05 NOTE — HOSPICE
[employer] [facility] to Those in Need 
[phone] Patient Name: General Caicedo YOB: 1950 Age: 78 y.o. [facility] RN Note:  Hospice consult noted. Chart reviewed. Plan of care discussed with patients nurse & care manager. In to meet with nephew, Snow Lowry and patient who responds to verbal stimuli. Nephew lives in [place] and drive here today to sign consents for hospice. Patient has declined significantly in past 24 hours. He is no longer able to swallow or speak with dyspnea 20-26 and agitation. Patient appears to be in pain as evidenced by facial grimacing and moaning. Prior to admission, patient lived independently in an apartment. He was never  and had no children. He was close to his brother, nieces and nephews. Mr. Saul Nelson was tearful as he described happy memories with the patient as a child. Michele Ruiz has been named the primary decision maker by his aunt Dmitri Prado and Oliverio White who no longer wish to take care of the patient. Patient will be transferred to the [facility]. CTI obtained from Arturo Nicolas MD. RN. Patient has Medicaid pending since October 1, 2019. ANT Torres expects Medicaid approval in 10 days. She has obtained permission from [employer] to offer payment of [facility] daily rate should patient patient become routine LOC and if Medicaid is not approved, [facility] will take the patient back. She will also complete FAP application for patient tomorrow as she has all of his financial information from the [facility] application process. Transport from [facility] to [facility] has been set for 6:30 by [employer] Report called to [facility] nurse, Rakesh Lane followed by report from unit nurse, Kay Cavanaugh. Thank you for the opportunity to serve this patient and his family. Thank you for the opportunity to be of service to this patient.

## 2019-11-05 NOTE — PROGRESS NOTES
Hospitalist Progress Note NAME: Crissy Sloan :  1950 MRN:  [id number] Assessment / Plan: 
Patient is comfort measures, see ACP for latest family meeting communication 
  
Dementia, newly diagnosed HFrEF (LVEF 20%) Pleural effusion Elevated troponin - Patient appears comfortable. - on morphin, haldol, ativan and scopolamine  
  
Constipation - More aggressive bowel regimen started 10/29/19. 
- Continue Miralax 17 gm po daily. Had large bm prior to fleets 
  
  
25.0 - 29.9 Overweight / Body mass index is 26.78 kg/m². 
  
Code status: DNR Prophylaxis: Not indicated Recommended Disposition: Comfort care, Hospice nurse will discuss next step in plan of care with family, she will call me when conversation done. 19: called [facility] hospice to see patient today Subjective: Chief Complaint / Reason for Physician Visit \" somnolent, not responding  \". Discussed with RN events overnight. Review of Systems: 
Symptom Y/N Comments  Symptom Y/N Comments Fever/Chills    Chest Pain Poor Appetite    Edema Cough    Abdominal Pain Sputum    Joint Pain SOB/LANDA    Pruritis/Rash Nausea/vomit    Tolerating PT/OT Diarrhea    Tolerating Diet Constipation    Other Could NOT obtain due to:   
 
Objective: VITALS:  
Last 24hrs VS reviewed since prior progress note. Most recent are: 
Patient Vitals for the past 24 hrs: 
 Temp Pulse Resp BP SpO2  
19 0801 98 °F (36.7 °C)      
19 0756  (!) 111 26 (!) 134/101 98 % 19 0215  (!) 126 24 (!) 122/93 98 % 19 1957 98.4 °F (36.9 °C) (!) 109 20 118/73 98 % 19 1334  (!) 114 (!) 36 (!) 139/97 100 % Intake/Output Summary (Last 24 hours) at 2019 1307 Last data filed at 2019 7715 Gross per 24 hour Intake 120 ml Output  Net 120 ml PHYSICAL EXAM: 
General: WD, WN, no acute distress   
EENT:  EOMI. Anicteric sclerae. MMM Resp: CTA bilaterally, no wheezing or rales. No accessory muscle use CV:  Regular  rhythm,  No edema GI:  Soft, Non distended, Non tender.  +Bowel sounds Neurologic:  Somnolent, not responding Psych:   Somnolent, not responding Skin:  No rashes. No jaundice Reviewed most current lab test results and cultures  YES Reviewed most current radiology test results   YES Review and summation of old records today    NO Reviewed patient's current orders and MAR    YES 
PMH/SH reviewed - no change compared to H&P 
________________________________________________________________________ Care Plan discussed with: 
  Comments Patient Family RN Care Manager Consultant Multidiciplinary team rounds were held today with , nursing, pharmacist and clinical coordinator. Patient's plan of care was discussed; medications were reviewed and discharge planning was addressed. ________________________________________________________________________ Total NON critical care TIME:  20   Minutes Total CRITICAL CARE TIME Spent:   Minutes non procedure based Comments >50% of visit spent in counseling and coordination of care    
________________________________________________________________________ Sarina Cogan, MD  
 
Procedures: see electronic medical records for all procedures/Xrays and details which were not copied into this note but were reviewed prior to creation of Plan. LABS: 
I reviewed today's most current labs and imaging studies. Pertinent labs include: No results for input(s): WBC, HGB, HCT, PLT, HGBEXT, HCTEXT, PLTEXT, HGBEXT, HCTEXT, PLTEXT in the last 72 hours. No results for input(s): NA, K, CL, CO2, GLU, BUN, CREA, CA, MG, PHOS, ALB, TBIL, TBILI, SGOT, ALT, INR, INREXT, INREXT in the last 72 hours.  
 
Signed: Sarina Cogan, MD

## 2019-11-05 NOTE — PROGRESS NOTES
Care plan reviewed Problem: Pressure Injury - Risk of 
Goal: *Prevention of pressure injury Description Document Yosvany Scale and appropriate interventions in the flowsheet. Outcome: Progressing Towards Goal 
Note:  
Pressure Injury Interventions: 
Sensory Interventions: Turn and reposition approx. every two hours (pillows and wedges if needed) Moisture Interventions: Check for incontinence Q2 hours and as needed Activity Interventions: Increase time out of bed Mobility Interventions: Turn and reposition approx. every two hours(pillow and wedges) Nutrition Interventions: Offer support with meals,snacks and hydration Friction and Shear Interventions: Minimize layers Problem: Patient Education: Go to Patient Education Activity Goal: Patient/Family Education Outcome: Progressing Towards Goal 
  
Problem: Falls - Risk of 
Goal: *Absence of Falls Description Document Narendrajyoti Mireles Fall Risk and appropriate interventions in the flowsheet. Outcome: Progressing Towards Goal 
Note:  
Fall Risk Interventions: 
Mobility Interventions: Mechanical lift, Patient to call before getting OOB Mentation Interventions: Adequate sleep, hydration, pain control Medication Interventions: Evaluate medications/consider consulting pharmacy, Patient to call before getting OOB Elimination Interventions: Toileting schedule/hourly rounds Problem: Patient Education: Go to Patient Education Activity Goal: Patient/Family Education Outcome: Progressing Towards Goal 
  
Problem: Fatigue-Palliative Care Goal: *PALLIATIVE CARE-Alleviation of fatigue Outcome: Progressing Towards Goal 
  
Problem: General Medical Care Plan Goal: *Vital signs within specified parameters Outcome: Progressing Towards Goal 
Goal: *Labs within defined limits Outcome: Progressing Towards Goal 
Goal: *Absence of infection signs and symptoms Outcome: Progressing Towards Goal 
 Goal: *Optimal pain control at patient's stated goal 
Outcome: Progressing Towards Goal 
Goal: *Skin integrity maintained Outcome: Progressing Towards Goal 
Goal: *Fluid volume balance Outcome: Progressing Towards Goal 
Goal: *Optimize nutritional status Outcome: Progressing Towards Goal 
Goal: *Anxiety reduced or absent Outcome: Progressing Towards Goal 
Goal: *Progressive mobility and function (eg: ADL's) Outcome: Progressing Towards Goal 
  
Problem: Patient Education: Go to Patient Education Activity Goal: Patient/Family Education Outcome: Progressing Towards Goal

## 2019-11-05 NOTE — PROGRESS NOTES
Attempted to contact Braden Gallego (liason with Hospice) to update on decline in patient status and status of Hospice referral. Called office, and was advised to contact on cell phone- called number provided ([phone]), no answer- left message with return callback number.

## 2019-11-05 NOTE — PROGRESS NOTES
Problem: Pressure Injury - Risk of 
Goal: *Prevention of pressure injury Description Document Yosvany Scale and appropriate interventions in the flowsheet. Outcome: Progressing Towards Goal 
Note:  
Pressure Injury Interventions: 
Sensory Interventions: Turn and reposition approx. every two hours (pillows and wedges if needed) Moisture Interventions: Check for incontinence Q2 hours and as needed, Minimize layers Activity Interventions: Increase time out of bed Mobility Interventions: Turn and reposition approx. every two hours(pillow and wedges) Nutrition Interventions: Offer support with meals,snacks and hydration Friction and Shear Interventions: Minimize layers Problem: Patient Education: Go to Patient Education Activity Goal: Patient/Family Education Outcome: Progressing Towards Goal 
  
Problem: Falls - Risk of 
Goal: *Absence of Falls Description Document Frida Post Fall Risk and appropriate interventions in the flowsheet. Outcome: Progressing Towards Goal 
Note:  
Fall Risk Interventions: 
Mobility Interventions: Mechanical lift Mentation Interventions: Adequate sleep, hydration, pain control, Door open when patient unattended, More frequent rounding Medication Interventions: Evaluate medications/consider consulting pharmacy Elimination Interventions: Toileting schedule/hourly rounds Problem: Patient Education: Go to Patient Education Activity Goal: Patient/Family Education Outcome: Progressing Towards Goal 
  
Problem: Fatigue-Palliative Care Goal: *PALLIATIVE CARE-Alleviation of fatigue Outcome: Progressing Towards Goal 
  
Problem: Fatigue-Palliative Care Goal: *PALLIATIVE CARE-Alleviation of fatigue Outcome: Progressing Towards Goal

## 2019-11-05 NOTE — PROGRESS NOTES
Transition of Care [place] of choice for Hospice - Nephew agreed for the [facility] - verbal ok. Patient approved for [address] , [place]  [address] 
[phone] The Nephew - here- met with Hospice Nurse in agreement to transfer. Talked to Administrate regarding concerns about disposition. In agreement to work with Hospice regarding disposition as appropriate. The Hospice Team to communicate with me regarding the above. DSS closed today and reopens tomorrow. Will talked to [facility] for follow-up. It is getting closer to the 45 days. Called [phone] regarding transportation. The earliest they can come is 6:30PM.  Fax the face sheet and PCS . DNR in folder to accompany patient. Care Management Interventions PCP Verified by CM: Yes 
Palliative Care Criteria Met (RRAT>21 & CHF Dx)?: Yes 
Palliative Consult Recommended?: Yes Mode of Transport at Discharge: BLS Transition of Care Consult (CM Consult): Discharge Planning, [address], [address] Discharge Durable Medical Equipment: No 
Current Support Network: Lives Alone Confirm Follow Up Transport: Other (see comment) Plan discussed with Pt/Family/Caregiver: Yes Freedom of Choice Offered: Yes Discharge Location Discharge Placement: Other:(Hospice House) [address] MSW RN  
[phone] / [phone]

## 2019-11-06 NOTE — H&P
[address] [facility] Help to Those in Need  [phone]    Patient Name: Welby Schilder  YOB: 1950    Date of Provider Hospice Visit: 11/06/19    Level of Care:   [x] General Inpatient (GIP)    [] Routine   [] Respite    Current Location of Care:  [] [facility] [] [facility] [] [facility] [] [facility] [] Hospice Formerly named [facility], patient referred from:  [] [facility] [] [facility] [] [facility] [x] [facility] [] Home [] Other:     Date of Original Hospice Admission: 11/5/19  Hospice Medical Director at time of admission: DR Adkins    Principle Hospice Diagnosis: Congestive Heart Failure  Diagnoses RELATED to the terminal prognosis: dementia     Other Diagnoses: anemia  Pt passed being examined by provider. HOSPICE SUMMARY      Welby Schilder is a 78y.o. year old who was admitted to [facility]. The patient's principle diagnosis has resulted in weakness, debility, fatigue, shortness of breath, pain, anxiety, agitation, delirium, confusion,  Dyspnea with exertion, orthopnea (sleeps on his side), swelling of his feet and decreased appetite, weakness and bedbound at this point unable to walk or carry out activities of daily living.            Functionally, the patient's Karnofsky and/or Palliative Performance Scale has declined over a period of days and is estimated at 20%. The patient is dependent on the following ADLs: pt is completely dependent for all ADLs. Objective information that support this patients limited prognosis includes: Shortness of breath, orthopnea, PND, 3+ pedal edema, proBNP 20,057, CXR shows cardiomegaly, left sided pleural effusion, vascular congestion. The patient/family chose comfort measures with the support of Hospice. HOSPICE DIAGNOSES   Active Symptoms:  1. Weakness and fatigue  2. Minimally responsive  3. Shortness of breath  4. Agitation  5. Anxiety      PLAN   1.  Admit to GIP  level of care for overwhelming symptoms, transitions of care, high risk for decline, this care cannot be provided in the home, the need for IV medications, frequent assessments are required by the medical team.  2. Medications include the following: morphine IV 2mg scheduled every 3 hours, additionally received 6 doses of prn IV morphine in 24 hours  3. Lorazepam 1 mg IV q 15 min as needed, required 5 doses in 24 hours  4. Transderm scop patch and robinul, had 1 dose of robinul  5. Haldol as needed had 2 doses, all IV medications  6. Continue comfort care     7.  and SW to support family needs  8. Disposition: pt passed    Prognosis estimated based on 11/06/19 clinical assessment is:   [] Hours to Days    [] Days to Weeks    [] Other:    Communicated plan of care with: Hospice Case Manager; Hospice IDT; Care Team     GOALS OF CARE     Patient/Medical POA stated Goal of Care: comfort care    [x] I have reviewed and/or updated ACP information in the Advance Care Planning Navigator. This information is available in the [address] [place] [address] Drive link in the patient's chart header.     Primary Decision [facility] ([address]):   Primary Decision Maker: Delfina Yousif - Sister - [phone]    Primary Decision Maker: Kamlesh Chakraborty - Brother - [phone]    Supplemental (Other) Decision Maker: Karthikcatalina Barfieldjose - Other Relative - [phone]    Resuscitation Status: DNR  If DNR is there a Durable DNR on file? : [x] Yes [] No (If no, complete Durable DNR)    HISTORY     History obtained from: chart     CHIEF COMPLAINT:    The patient is:   [] Verbal  [] Nonverbal  [] Unresponsive    HPI/SUBJECTIVE:  Not assessed       REVIEW OF SYSTEMS     The following systems were: [] reviewed  [] unable to be reviewed Not assessed    Positive ROS include:  Constitutional: fatigue, weakness, in pain, short of breath  Ears/nose/mouth/throat: increased airway secretions  Respiratory:shortness of breath, wheezing  Gastrointestinal:poor appetite, nausea, vomiting, abdominal pain, constipation, diarrhea  Musculoskeletal:pain, deformities, swelling legs  Neurologic:confusion, hallucinations, weakness  Psychiatric:anxiety, feeling depressed, poor sleep  Endocrine:     Adult Non-Verbal Pain Assessment Score: Not assessed    Face  [] 0   No particular expression or smile  [] 1   Occasional grimace, tearing, frowning, wrinkled forehead  [] 2   Frequent grimace, tearing, frowning, wrinkled forehead    Activity (movement)  [] 0   Lying quietly, normal position  [] 1   Seeking attention through movement or slow, cautious movement  [] 2   Restless, excessive activity and/or withdrawal reflexes    Guarding  [] 0   Lying quietly, no positioning of hands over areas of body  [] 1   Splinting areas of the body, tense  [] 2   Rigid, stiff    Physiology (vital signs)  [] 0   Stable vital signs  [] 1   Change in any of the following: SBP > 20mm Hg; HR > 20/minute  [] 2   Change in any of the following: SBP > 30mm Hg; HR > 25/minute    Respiratory  [] 0   Baseline RR/SpO2, compliant with ventilator  [] 1   RR > 10 above baseline, or 5% drop SpO2, mild asynchrony with ventilator  [] 2   RR > 20 above baseline, or 10% drop SpO2, asynchrony with ventilator     FUNCTIONAL ASSESSMENT     Palliative Performance Scale (PPS): 10-20%       PSYCHOSOCIAL/SPIRITUAL ASSESSMENT     Active Problems:    * No active hospital problems. *    No past medical history on file. No past surgical history on file. Social History     Tobacco Use    Smoking status: Former Smoker    Tobacco comment: quit 4 years ago   Substance Use Topics    Alcohol use: Not Currently     Comment: quit 3 years ago     No family history on file.    No Known Allergies   Current Facility-Administered Medications   Medication Dose Route Frequency    bisacodyl (DULCOLAX) suppository 10 mg  10 mg Rectal DAILY PRN    morphine injection 2 mg  2 mg IntraVENous Q3H    morphine injection 2 mg  2 mg IntraVENous Q15MIN PRN    LORazepam (ATIVAN) injection 1 mg  1 mg IntraVENous Q15MIN PRN    haloperidol lactate (HALDOL) injection 2 mg  2 mg IntraVENous Q2H PRN    scopolamine (TRANSDERM-SCOP) 1 mg over 3 days 1 Patch  1 Patch TransDERmal Q72H PRN    glycopyrrolate (ROBINUL) injection 0.1 mg  0.1 mg IntraVENous Q6H PRN    acetaminophen (TYLENOL) suppository 650 mg  650 mg Rectal Q4H PRN        PHYSICAL EXAM     Wt Readings from Last 3 Encounters:   11/05/19 73 kg (161 lb)   11/05/19 73.2 kg (161 lb 6 oz)   10/04/19 63.9 kg (140 lb 14 oz)       Visit Vitals  /80   Pulse (!) 31   Temp 97.4 °F (36.3 °C)   Resp 23   Ht 5' 5\" (1.651 m)   Wt 73 kg (161 lb)   SpO2 (!) 40%   BMI 26.79 kg/m²       Supplemental O2  [] Yes  [x] NO  Last bowel movement: PTA    Currently this patient has:  [x] Peripheral IV [] PICC  [] PORT [] ICD    [x] Kaiser Catheter [] NG Tube   [] PEG Tube    [] Rectal Tube [] Drain  [] Other:     Constitutional: Not assessed  Eyes: pupils equal, anicteric  ENMT: no nasal discharge, moist mucous membranes  Cardiovascular: regular rhythm, distal pulses intact  Respiratory: breathing not labored, symmetric  Gastrointestinal: soft non-tender, +bowel sounds  Musculoskeletal: no deformity, no tenderness to palpation  Skin: warm, dry  Neurologic:pt is/ not able to follow commands, pt is/ not moving all extremities  Psychiatric: full affect, no hallucinations, _ depression, _ confusion        Pertinent Lab and or Imaging Tests:  Lab Results   Component Value Date/Time    Sodium 134 (L) 10/09/2019 04:25 AM    Potassium 4.8 10/09/2019 04:25 AM    Chloride 102 10/09/2019 04:25 AM    CO2 22 10/09/2019 04:25 AM    Anion gap 10 10/09/2019 04:25 AM    Glucose 103 (H) 10/09/2019 04:25 AM    BUN 13 10/09/2019 04:25 AM    Creatinine 0.91 10/09/2019 04:25 AM    BUN/Creatinine ratio 14 10/09/2019 04:25 AM    GFR est AA >60 10/09/2019 04:25 AM    GFR est non-AA >60 10/09/2019 04:25 AM    Calcium 8.9 10/09/2019 04:25 AM     Lab Results   Component Value Date/Time    Protein, total 6.8 09/30/2019 04:19 PM    Albumin 3.1 (L) 09/30/2019 04:19 PM                Baron Blanca NP

## 2019-11-06 NOTE — HOSPICE
[facility] to Those in Need  [phone]    Patient Name: Krysten Camacho  YOB: 1950  Age: 78 y.o. [address] [facility] RN Note:    Bereavement call to nephew, Criselda Lee. Offered condolences to him and his family and thanked him for his advocacy of the patient. He expressed gratitude for our assistance and service. Thank you for the opportunity to be of service to this patient.

## 2019-11-06 NOTE — PROGRESS NOTES
Admitted to hospice. Client appears actively dying. Primary goal of comfort.   O2 and prn medications to aid

## 2019-11-06 NOTE — PROGRESS NOTES
Patient d/c via transport to [facility]; not responding to touch or voice- appears comfortable in no distress. VS obtained by transport staff.

## 2019-11-06 NOTE — PROGRESS NOTES
0800 Report received from Kristy 47.  0172 Patient found without breath and pulse. Patient  at [facility] 82 [place] to nephew [facility] informed of [place] passing. Nephew stated no family to come to [facility].  0900 Call made to [facility]. [id number] Post-mortem care completed.

## 2019-11-06 NOTE — PROGRESS NOTES
NAME OF PATIENT:  Vamshi Baker    LEVEL OF CARE:  GIP    REASON FOR GIP:   Unmanageable respiratory distress and Stabilizing treatment that cannot take place at home    *PATIENT REMAINS ELIGIBLE FOR [place] LEVEL OF CARE AS EVIDENCED BY: Frequent need for IV medication to aid respiratory distress and periods of agitation reported prior to admit    O2 SAFETY:  Concentrator positioning (6\" from furniture/drapes) and On 2 L NC    FALL INTERVENTIONS PROVIDED:   Implemented/recommended resources for alarm system (personal alarm, bed alarm, call bell, etc.)  and Implemented/recommended increased supervision/assistance    INTERDISPLINARY COMMUNICATION/COLLABORATION:  Physician, MSW, Shukri and RN, CNA    NEW MEDICATION INITIATION DOCUMENTATION:  New orders per Day shift for admit with Dr Benito Rivera    Reason medication is being initiated:  Medications for admit    MD / Provider name consulted re: change in status / initiation of new medication:  Dr Benito Rivera per Day shift    New Symptom(s):  N/A    New Order(s):  N/A    Name of the person notified of the changes:  Nephew avail by phone    Name of person being taught:  No family present    Instructions given:  N/A    Side Effects taught:  N/A    Response to teaching:  N/A      COMFORTABLE DYING MEASURE:  Is Patient/family satisfied with symptom level?  yes    DISCHARGE PLAN:  Pending-likely end of life  1945  Arrived via Ambulance. PA completed. 2015  Unable to admit client in computer call to IS to assist.  2040  [facility] able to admit client to computer. Orders obtained via previous shift and to be reviewed by pharmacy as now released  2058  Scheduled morphine and prn dose of lorazepam as per [facility]  Client with RR of 16  10-15 sec periods of apnea noted but with use of accessory muscles to aid breathing Neck abdominal and chest Cheyne delong type pattern. Moaning on exhalation intermittently.   Will continue to monitor  2123  Resting quietly now with noted decrease WOB.  Will continue to monitor  2207  Prn dose of morphine RR at 18 with 10 sec period of apnea  Using sternocleidomastoid muscles to aid breathing and abdominal muscles. Will continue to monitor  2245  Less use of accessory muscles noted. Facial muscles relaxed. Extremities relaxed. Will continue to monitor  2352  Scheduled medication as per MARs. Decrease WOB noted s/p admin. #16 coude inserted without difficulty (at time of admit tried #16 regular johnson and was unable to get past prostate). Will continue to monitor  0029  Respirations labored with use of sternocleidomastoid muscles. Intermittent load groan heard with exhalation. Occasional struggle noted to cough. PRN morphine lorazepam and Scope patch applied. Will continue to monitor  0117  Loud moaning heard from nurses station. Continues with some use of accessory muscles to aid breathing. PRN morphine and haldol as per MARs. Mouth care provided. Struggling with secretions noted in back of throat yet lungs sound clear. Positioned on R side and appear to breath easier. Will continue to monitor  0145  Resting quietly with relaxed facial and extremity muscles. Respirations now appear unlabored in nature. Will continue to monitor  0246  RR even now at 21/min without apneic periods at present. Continues to utilize accessory muscles to aid breathing but much more relaxed appearance. Scheduled dose of Morphine as per MARs. Will continue to monitor  [phone] with slightly labored respirations, relaxed facial and extremity muscles. Will continue to monitor  0438  RR at 18 now without apneic periods at present. Respirations noted to be shallow in nature with slight use of accessory muscles. No other signs of distress. Will continue to monitor  0532  Client moaning with increase WOB s/p bed and bath by CNA. Also struggling with secretions  RR up to 28. PRN dose of morphine,lorazepam and robinul.   Suctioned orally for sm amount bloody sput.  0541  Continued moaning and use of accessory muscles. Additional dose of morphine and lorazepam as per [facility] Calming respirations. Continuing to monitor  0548  Shallow coarse respirations noted with decrease use of accessory muscles. Not moaning at present  Facial muscles relaxed. Will continue to monitor  5142  Continues to struggle with secretions some. Respirations slightly labored and shallow. Will continue to monitor  4603  Still struggling with secretions. RR at 28  Positive use of accessory muscles. PRN dose of Haldol and morphine. Positioned further on L side. Will continue to monitor  0655  RR of 28 with HR or 132 noted   Continued use of neck and abdominal muscles to aid breathing. PRN dose of lorazepam and morphine as per MARs. Client calmer now with decrease WOB noted and handling secretions better now far on L side.   Will continue to monitor

## 2019-11-06 NOTE — PROGRESS NOTES
Pharmacist Medication Review    There were no patient \"home supplied\" medications for the pharmacist to review. Current orders are supplied from [facility]. The current medication orders have been reviewed and are appropriate.       Signed: Srinivasa Cross PharmD   Phone: [phone]

## 2021-06-10 NOTE — PROGRESS NOTES
Bedside shift change report given to Dottie RN (oncoming nurse) by Johnson Ocasio RN (offgoing nurse). Report included the following information SBAR and Kardex. No
